# Patient Record
Sex: MALE | Race: BLACK OR AFRICAN AMERICAN | Employment: OTHER | URBAN - METROPOLITAN AREA
[De-identification: names, ages, dates, MRNs, and addresses within clinical notes are randomized per-mention and may not be internally consistent; named-entity substitution may affect disease eponyms.]

---

## 2017-04-30 ENCOUNTER — APPOINTMENT (OUTPATIENT)
Dept: GENERAL RADIOLOGY | Age: 62
End: 2017-04-30
Attending: EMERGENCY MEDICINE
Payer: SELF-PAY

## 2017-04-30 ENCOUNTER — HOSPITAL ENCOUNTER (EMERGENCY)
Age: 62
Discharge: HOME | End: 2017-04-30
Attending: EMERGENCY MEDICINE
Payer: SELF-PAY

## 2017-04-30 ENCOUNTER — APPOINTMENT (OUTPATIENT)
Dept: CT IMAGING | Age: 62
End: 2017-04-30
Attending: EMERGENCY MEDICINE
Payer: SELF-PAY

## 2017-04-30 VITALS
HEIGHT: 69 IN | DIASTOLIC BLOOD PRESSURE: 77 MMHG | HEART RATE: 87 BPM | RESPIRATION RATE: 17 BRPM | SYSTOLIC BLOOD PRESSURE: 137 MMHG | BODY MASS INDEX: 23.7 KG/M2 | OXYGEN SATURATION: 98 % | WEIGHT: 160 LBS | TEMPERATURE: 97.4 F

## 2017-04-30 DIAGNOSIS — R91.1 LUNG NODULE: ICD-10-CM

## 2017-04-30 DIAGNOSIS — R07.89 ATYPICAL CHEST PAIN: Primary | ICD-10-CM

## 2017-04-30 LAB
ALBUMIN SERPL BCP-MCNC: 3.5 G/DL (ref 3.2–4.6)
ALBUMIN/GLOB SERPL: 0.9 {RATIO} (ref 1.2–3.5)
ALP SERPL-CCNC: 89 U/L (ref 50–136)
ALT SERPL-CCNC: 18 U/L (ref 12–65)
ANION GAP BLD CALC-SCNC: 8 MMOL/L (ref 7–16)
AST SERPL W P-5'-P-CCNC: 11 U/L (ref 15–37)
BASOPHILS # BLD AUTO: 0 K/UL (ref 0–0.2)
BASOPHILS # BLD: 0 % (ref 0–2)
BILIRUB SERPL-MCNC: 0.4 MG/DL (ref 0.2–1.1)
BUN SERPL-MCNC: 22 MG/DL (ref 8–23)
CALCIUM SERPL-MCNC: 8.5 MG/DL (ref 8.3–10.4)
CHLORIDE SERPL-SCNC: 108 MMOL/L (ref 98–107)
CO2 SERPL-SCNC: 28 MMOL/L (ref 21–32)
CREAT SERPL-MCNC: 1.27 MG/DL (ref 0.8–1.5)
DIFFERENTIAL METHOD BLD: ABNORMAL
EOSINOPHIL # BLD: 0.1 K/UL (ref 0–0.8)
EOSINOPHIL NFR BLD: 2 % (ref 0.5–7.8)
ERYTHROCYTE [DISTWIDTH] IN BLOOD BY AUTOMATED COUNT: 13.7 % (ref 11.9–14.6)
GLOBULIN SER CALC-MCNC: 3.7 G/DL (ref 2.3–3.5)
GLUCOSE SERPL-MCNC: 121 MG/DL (ref 65–100)
HCT VFR BLD AUTO: 40.6 % (ref 41.1–50.3)
HGB BLD-MCNC: 13.9 G/DL (ref 13.6–17.2)
IMM GRANULOCYTES # BLD: 0 K/UL (ref 0–0.5)
IMM GRANULOCYTES NFR BLD AUTO: 0.2 % (ref 0–5)
LYMPHOCYTES # BLD AUTO: 19 % (ref 13–44)
LYMPHOCYTES # BLD: 1.2 K/UL (ref 0.5–4.6)
MCH RBC QN AUTO: 29 PG (ref 26.1–32.9)
MCHC RBC AUTO-ENTMCNC: 34.2 G/DL (ref 31.4–35)
MCV RBC AUTO: 84.6 FL (ref 79.6–97.8)
MONOCYTES # BLD: 0.5 K/UL (ref 0.1–1.3)
MONOCYTES NFR BLD AUTO: 7 % (ref 4–12)
NEUTS SEG # BLD: 4.7 K/UL (ref 1.7–8.2)
NEUTS SEG NFR BLD AUTO: 72 % (ref 43–78)
PLATELET # BLD AUTO: 269 K/UL (ref 150–450)
PMV BLD AUTO: 8.9 FL (ref 10.8–14.1)
POTASSIUM SERPL-SCNC: 3.8 MMOL/L (ref 3.5–5.1)
PROT SERPL-MCNC: 7.2 G/DL (ref 6.3–8.2)
RBC # BLD AUTO: 4.8 M/UL (ref 4.23–5.67)
SODIUM SERPL-SCNC: 144 MMOL/L (ref 136–145)
TROPONIN I BLD-MCNC: 0 NG/ML (ref 0–0.08)
TROPONIN I SERPL-MCNC: <0.02 NG/ML (ref 0.02–0.05)
WBC # BLD AUTO: 6.5 K/UL (ref 4.3–11.1)

## 2017-04-30 PROCEDURE — 93005 ELECTROCARDIOGRAM TRACING: CPT | Performed by: EMERGENCY MEDICINE

## 2017-04-30 PROCEDURE — 74011250636 HC RX REV CODE- 250/636: Performed by: EMERGENCY MEDICINE

## 2017-04-30 PROCEDURE — 74011000258 HC RX REV CODE- 258: Performed by: EMERGENCY MEDICINE

## 2017-04-30 PROCEDURE — 96374 THER/PROPH/DIAG INJ IV PUSH: CPT | Performed by: EMERGENCY MEDICINE

## 2017-04-30 PROCEDURE — 96375 TX/PRO/DX INJ NEW DRUG ADDON: CPT | Performed by: EMERGENCY MEDICINE

## 2017-04-30 PROCEDURE — 71020 XR CHEST PA LAT: CPT

## 2017-04-30 PROCEDURE — 96361 HYDRATE IV INFUSION ADD-ON: CPT | Performed by: EMERGENCY MEDICINE

## 2017-04-30 PROCEDURE — 74011636320 HC RX REV CODE- 636/320: Performed by: EMERGENCY MEDICINE

## 2017-04-30 PROCEDURE — 85025 COMPLETE CBC W/AUTO DIFF WBC: CPT | Performed by: EMERGENCY MEDICINE

## 2017-04-30 PROCEDURE — 84484 ASSAY OF TROPONIN QUANT: CPT | Performed by: EMERGENCY MEDICINE

## 2017-04-30 PROCEDURE — 99284 EMERGENCY DEPT VISIT MOD MDM: CPT | Performed by: EMERGENCY MEDICINE

## 2017-04-30 PROCEDURE — 80053 COMPREHEN METABOLIC PANEL: CPT | Performed by: EMERGENCY MEDICINE

## 2017-04-30 PROCEDURE — 71260 CT THORAX DX C+: CPT

## 2017-04-30 RX ORDER — TRAMADOL HYDROCHLORIDE 50 MG/1
50-100 TABLET ORAL
Qty: 20 TAB | Refills: 0 | Status: SHIPPED | OUTPATIENT
Start: 2017-04-30 | End: 2018-04-20

## 2017-04-30 RX ORDER — NAPROXEN SODIUM 550 MG/1
550 TABLET ORAL 2 TIMES DAILY WITH MEALS
Qty: 20 TAB | Refills: 0 | Status: SHIPPED | OUTPATIENT
Start: 2017-04-30 | End: 2018-04-20

## 2017-04-30 RX ORDER — SODIUM CHLORIDE 0.9 % (FLUSH) 0.9 %
10 SYRINGE (ML) INJECTION
Status: COMPLETED | OUTPATIENT
Start: 2017-04-30 | End: 2017-04-30

## 2017-04-30 RX ORDER — ONDANSETRON 2 MG/ML
4 INJECTION INTRAMUSCULAR; INTRAVENOUS
Status: COMPLETED | OUTPATIENT
Start: 2017-04-30 | End: 2017-04-30

## 2017-04-30 RX ORDER — HYDROMORPHONE HYDROCHLORIDE 1 MG/ML
1 INJECTION, SOLUTION INTRAMUSCULAR; INTRAVENOUS; SUBCUTANEOUS
Status: COMPLETED | OUTPATIENT
Start: 2017-04-30 | End: 2017-04-30

## 2017-04-30 RX ORDER — KETOROLAC TROMETHAMINE 30 MG/ML
30 INJECTION, SOLUTION INTRAMUSCULAR; INTRAVENOUS
Status: COMPLETED | OUTPATIENT
Start: 2017-04-30 | End: 2017-04-30

## 2017-04-30 RX ADMIN — SODIUM CHLORIDE 1000 ML: 900 INJECTION, SOLUTION INTRAVENOUS at 15:38

## 2017-04-30 RX ADMIN — KETOROLAC TROMETHAMINE 30 MG: 30 INJECTION, SOLUTION INTRAMUSCULAR; INTRAVENOUS at 15:38

## 2017-04-30 RX ADMIN — HYDROMORPHONE HYDROCHLORIDE 1 MG: 1 INJECTION, SOLUTION INTRAMUSCULAR; INTRAVENOUS; SUBCUTANEOUS at 15:38

## 2017-04-30 RX ADMIN — Medication 10 ML: at 17:12

## 2017-04-30 RX ADMIN — ONDANSETRON 4 MG: 2 INJECTION INTRAMUSCULAR; INTRAVENOUS at 15:38

## 2017-04-30 RX ADMIN — SODIUM CHLORIDE 100 ML: 900 INJECTION, SOLUTION INTRAVENOUS at 17:12

## 2017-04-30 RX ADMIN — IOVERSOL 100 ML: 741 INJECTION INTRA-ARTERIAL; INTRAVENOUS at 17:12

## 2017-04-30 NOTE — DISCHARGE INSTRUCTIONS
Follow-up in 1 year for the pulmonary nodule for repeat CAT scan with your primary care doctor  Follow-up with Dr. Philip Trevino if you don't have a primary care physician           Learning About Lung Nodules  What is a lung nodule? A lung nodule is a growth in the lung. A single nodule surrounded by lung tissue is called a solitary pulmonary nodule. A lung nodule might not cause any symptoms. Your doctor may have found one or more nodules on your lung when you were having a chest X-ray or CT scan. Or it may have been found during a lung cancer screening. A lung nodule may be caused by an old infection or cancer. It might also be a noncancerous growth. Lung nodules can cause a screening to give an abnormal result. Most nodules do not cause any harm. But without further tests, your doctor can't tell whether an abnormal finding is cancer, a harmless nodule, or something else. What can you expect when you have a lung nodule? Your doctor will look at several risk factors to see how likely it is that the nodule is cancer. He or she will look at:  · Whether you smoke or have ever smoked. · Your age and your family's medical history. · Whether you have ever had lung cancer. · The size and shape of the nodule. · Whether the nodule has changed in size. Your doctor may look at past chest X-rays or CT scans, if available, and compare them. Or you may have a series of CT scans to see if the nodule grows over time. What happens next depends on the risk of the nodule being cancer. · If you have no risk factors and the nodule is small, your doctor may advise doing nothing. · If the risk is small, your doctor may schedule follow-up appointments and tests. You may have more CT scans later to see if the nodule is growing. If the nodule hasn't changed in 3 to 6 months, you may have CT scans every year. If it hasn't changed in 2 years, you may not need any more tests.   · If there's a higher risk of cancer, your doctor may:  ¨ Do a PET scan, which may help tell if the nodule is cancerous or not. ¨ Take a sample of tissue from the nodule for testing. This is called a biopsy. ¨ Remove the nodule with surgery. Follow-up care is a key part of your treatment and safety. Be sure to make and go to all appointments, and call your doctor if you are having problems. It's also a good idea to know your test results and keep a list of the medicines you take. Where can you learn more? Go to http://rakesh-jenn.info/. Enter S789 in the search box to learn more about \"Learning About Lung Nodules. \"  Current as of: July 26, 2016  Content Version: 11.2  © 8419-6452 [employer]. Care instructions adapted under license by [employer] (which disclaims liability or warranty for this information). If you have questions about a medical condition or this instruction, always ask your healthcare professional. [place] [address] any warranty or liability for your use of this information. Musculoskeletal Chest Pain: Care Instructions  Your Care Instructions  Chest pain is not always a sign that something is wrong with your heart or that you have another serious problem. The doctor thinks your chest pain is caused by strained muscles or ligaments, inflamed chest cartilage, or another problem in your chest, rather than by your heart. You may need more tests to find the cause of your chest pain. Follow-up care is a key part of your treatment and safety. Be sure to make and go to all appointments, and call your doctor if you are having problems. Its also a good idea to know your test results and keep a list of the medicines you take. How can you care for yourself at home? · Take pain medicines exactly as directed. ¨ If the doctor gave you a prescription medicine for pain, take it as prescribed.   ¨ If you are not taking a prescription pain medicine, ask your doctor if you can take an over-the-counter medicine. · Rest and protect the sore area. · Stop, change, or take a break from any activity that may be causing your pain or soreness. · Put ice or a cold pack on the sore area for 10 to 20 minutes at a time. Try to do this every 1 to 2 hours for the next 3 days (when you are awake) or until the swelling goes down. Put a thin cloth between the ice and your skin. · After 2 or 3 days, apply a heating pad set on low or a warm cloth to the area that hurts. Some doctors suggest that you go back and forth between hot and cold. · Do not wrap or tape your ribs for support. This may cause you to take smaller breaths, which could increase your risk of lung problems. · Mentholated creams such as Bengay or Icy Hot may soothe sore muscles. Follow the instructions on the package. · Follow your doctor's instructions for exercising. · Gentle stretching and massage may help you get better faster. Stretch slowly to the point just before pain begins, and hold the stretch for at least 15 to 30 seconds. Do this 3 or 4 times a day. Stretch just after you have applied heat. · As your pain gets better, slowly return to your normal activities. Any increased pain may be a sign that you need to rest a while longer. When should you call for help? Call 911 anytime you think you may need emergency care. For example, call if:  · You have chest pain or pressure. This may occur with:  ¨ Sweating. ¨ Shortness of breath. ¨ Nausea or vomiting. ¨ Pain that spreads from the chest to the neck, jaw, or one or both shoulders or arms. ¨ Dizziness or lightheadedness. ¨ A fast or uneven pulse. After calling 911, chew 1 adult-strength aspirin. Wait for an ambulance. Do not try to drive yourself. · You have sudden chest pain and shortness of breath, or you cough up blood. Call your doctor now or seek immediate medical care if:  · You have any trouble breathing. · Your chest pain gets worse.   · Your chest pain occurs consistently with exercise and is relieved by rest.  Watch closely for changes in your health, and be sure to contact your doctor if:  · Your chest pain does not get better after 1 week. Where can you learn more? Go to http://rakesh-jenn.info/. Enter V293 in the search box to learn more about \"Musculoskeletal Chest Pain: Care Instructions. \"  Current as of: May 27, 2016  Content Version: 11.2  © 6371-0433 [employer]. Care instructions adapted under license by [employer] (which disclaims liability or warranty for this information). If you have questions about a medical condition or this instruction, always ask your healthcare professional. [address] any warranty or liability for your use of this information. Chest Pain: Care Instructions  Your Care Instructions  There are many things that can cause chest pain. Some are not serious and will get better on their own in a few days. But some kinds of chest pain need more testing and treatment. Your doctor may have recommended a follow-up visit in the next 8 to 12 hours. If you are not getting better, you may need more tests or treatment. Even though your doctor has released you, you still need to watch for any problems. The doctor carefully checked you, but sometimes problems can develop later. If you have new symptoms or if your symptoms do not get better, get medical care right away. If you have worse or different chest pain or pressure that lasts more than 5 minutes or you passed out (lost consciousness), call 911 or seek other emergency help right away. A medical visit is only one step in your treatment. Even if you feel better, you still need to do what your doctor recommends, such as going to all suggested follow-up appointments and taking medicines exactly as directed. This will help you recover and help prevent future problems. How can you care for yourself at home?   · Rest until you feel better. · Take your medicine exactly as prescribed. Call your doctor if you think you are having a problem with your medicine. · Do not drive after taking a prescription pain medicine. When should you call for help? Call 911 if:  · You passed out (lost consciousness). · You have severe difficulty breathing. · You have symptoms of a heart attack. These may include:  ¨ Chest pain or pressure, or a strange feeling in your chest.  ¨ Sweating. ¨ Shortness of breath. ¨ Nausea or vomiting. ¨ Pain, pressure, or a strange feeling in your back, neck, jaw, or upper belly or in one or both shoulders or arms. ¨ Lightheadedness or sudden weakness. ¨ A fast or irregular heartbeat. After you call 911, the  may tell you to chew 1 adult-strength or 2 to 4 low-dose aspirin. Wait for an ambulance. Do not try to drive yourself. Call your doctor today if:  · You have any trouble breathing. · Your chest pain gets worse. · You are dizzy or lightheaded, or you feel like you may faint. · You are not getting better as expected. · You are having new or different chest pain. Where can you learn more? Go to http://rakesh-jenn.info/. Enter A120 in the search box to learn more about \"Chest Pain: Care Instructions. \"  Current as of: May 27, 2016  Content Version: 11.2  © 0394-3387 [employer]. Care instructions adapted under license by [employer] (which disclaims liability or warranty for this information). If you have questions about a medical condition or this instruction, always ask your healthcare professional. [address] any warranty or liability for your use of this information.

## 2017-04-30 NOTE — ED PROVIDER NOTES
HPI Comments: Pt states thsi sharp upper right chest pain began 2 hours ago whilst laying down, he then rolled over and felt a sudden stab of pain about the clavicle,  It hurts to move and hurts to breath. Family indicates he has actually been complaining about this for a few days, just not this severely. He denies nausea/vomiting, diaphoresis. No arm or shoulder pain but it does radiate up about the neck area. No remedies tried  No prior episodes    Patient is a 62 y.o. male presenting with chest pain. The history is provided by the patient and a relative. Chest Pain (Angina)    This is a new problem. Associated symptoms include shortness of breath. Pertinent negatives include no abdominal pain, no back pain, no cough, no dizziness, no fever, no headaches, no nausea, no numbness, no palpitations, no vomiting and no weakness. Past Medical History:   Diagnosis Date    Cardiomegaly     hernia     MI (myocardial infarction) ([facility] [address]     5-6yrs ago       Past Surgical History:   Procedure Laterality Date    ABDOMEN SURGERY PROC UNLISTED      hernia    HX OTHER SURGICAL           No family history on file. Social History     Social History    Marital status: SINGLE     Spouse name: N/A    Number of children: N/A    Years of education: N/A     Occupational History    Not on file. Social History Main Topics    Smoking status: Current Every Day Smoker     Packs/day: 0.50    Smokeless tobacco: Not on file    Alcohol use No    Drug use: Yes     Special: Cocaine      Comment: yesterday    Sexual activity: Not Currently     Other Topics Concern    Not on file     Social History Narrative    No narrative on file         ALLERGIES: Review of patient's allergies indicates no known allergies. Review of Systems   Constitutional: Negative for chills and fever. HENT: Negative for rhinorrhea and sore throat. Eyes: Negative for discharge and redness.    Respiratory: Positive for shortness of breath. Negative for cough. Cardiovascular: Positive for chest pain. Negative for palpitations. Gastrointestinal: Negative for abdominal pain, nausea and vomiting. Musculoskeletal: Positive for neck pain. Negative for arthralgias and back pain. Skin: Negative for rash. Neurological: Negative for dizziness, weakness, numbness and headaches. All other systems reviewed and are negative. Vitals:    04/30/17 1351   BP: 146/86   Pulse: (!) 103   Resp: 17   Temp: 97.4 °F (36.3 °C)   SpO2: 96%   Weight: 72.6 kg (160 lb)   Height: 5' 9\" (1.753 m)            Physical Exam   Constitutional: He is oriented to person, place, and time. He appears well-developed and well-nourished. He appears distressed. HENT:   Head: Normocephalic and atraumatic. Eyes: Conjunctivae are normal. Pupils are equal, round, and reactive to light. Right eye exhibits no discharge. Left eye exhibits no discharge. No scleral icterus. Neck: Normal range of motion. Neck supple. Cardiovascular: Normal rate, regular rhythm and normal heart sounds. Exam reveals no gallop. No murmur heard. Pulmonary/Chest: Effort normal and breath sounds normal. No respiratory distress. He has no wheezes. He has no rales. He exhibits tenderness. He exhibits no crepitus. Abdominal: Soft. Bowel sounds are normal. There is no tenderness. There is no guarding. Musculoskeletal: Normal range of motion. He exhibits no edema. Neurological: He is alert and oriented to person, place, and time. He exhibits normal muscle tone. cni 2-12 grossly   Skin: Skin is warm and dry. He is not diaphoretic. Psychiatric: He has a normal mood and affect. His behavior is normal.   Nursing note and vitals reviewed.        MDM  Number of Diagnoses or Management Options  Diagnosis management comments: Medical decision making note:  Chest pain  Hurts to touch, move, breathe  ekg and troponin are ok    This concludes the \"medical decision making note\" part of this emergency department visit note.       ED Course       Procedures

## 2017-04-30 NOTE — ED NOTES
I have reviewed discharge instructions with the patient. The patient verbalized understanding. No questions when given opportunity to ask. Patient discharged via [facility] to [place] [address] with family who is driving home. Esign not available.

## 2017-05-01 ENCOUNTER — HOSPITAL ENCOUNTER (EMERGENCY)
Age: 62
Discharge: HOME | End: 2017-05-01
Payer: SELF-PAY

## 2017-05-01 ENCOUNTER — APPOINTMENT (OUTPATIENT)
Dept: GENERAL RADIOLOGY | Age: 62
End: 2017-05-01
Attending: EMERGENCY MEDICINE
Payer: SELF-PAY

## 2017-05-01 VITALS
TEMPERATURE: 98.3 F | DIASTOLIC BLOOD PRESSURE: 92 MMHG | SYSTOLIC BLOOD PRESSURE: 145 MMHG | OXYGEN SATURATION: 94 % | HEART RATE: 95 BPM | RESPIRATION RATE: 16 BRPM | HEIGHT: 69 IN | WEIGHT: 160 LBS | BODY MASS INDEX: 23.7 KG/M2

## 2017-05-01 DIAGNOSIS — R07.89 ATYPICAL CHEST PAIN: Primary | ICD-10-CM

## 2017-05-01 DIAGNOSIS — F41.9 ANXIETY: ICD-10-CM

## 2017-05-01 LAB
ALBUMIN SERPL BCP-MCNC: 3.5 G/DL (ref 3.2–4.6)
ALBUMIN/GLOB SERPL: 0.9 {RATIO} (ref 1.2–3.5)
ALP SERPL-CCNC: 87 U/L (ref 50–136)
ALT SERPL-CCNC: 21 U/L (ref 12–65)
ANION GAP BLD CALC-SCNC: 7 MMOL/L (ref 7–16)
AST SERPL W P-5'-P-CCNC: 11 U/L (ref 15–37)
ATRIAL RATE: 105 BPM
ATRIAL RATE: 92 BPM
BASOPHILS # BLD AUTO: 0 K/UL (ref 0–0.2)
BASOPHILS # BLD: 0 % (ref 0–2)
BILIRUB SERPL-MCNC: 0.6 MG/DL (ref 0.2–1.1)
BUN SERPL-MCNC: 19 MG/DL (ref 8–23)
CALCIUM SERPL-MCNC: 8.1 MG/DL (ref 8.3–10.4)
CALCULATED P AXIS, ECG09: 65 DEGREES
CALCULATED P AXIS, ECG09: 69 DEGREES
CALCULATED R AXIS, ECG10: -16 DEGREES
CALCULATED R AXIS, ECG10: 37 DEGREES
CALCULATED T AXIS, ECG11: 45 DEGREES
CALCULATED T AXIS, ECG11: 49 DEGREES
CHLORIDE SERPL-SCNC: 104 MMOL/L (ref 98–107)
CO2 SERPL-SCNC: 28 MMOL/L (ref 21–32)
CREAT SERPL-MCNC: 1.3 MG/DL (ref 0.8–1.5)
DIAGNOSIS, 93000: NORMAL
DIAGNOSIS, 93000: NORMAL
DIFFERENTIAL METHOD BLD: ABNORMAL
EOSINOPHIL # BLD: 0 K/UL (ref 0–0.8)
EOSINOPHIL NFR BLD: 0 % (ref 0.5–7.8)
ERYTHROCYTE [DISTWIDTH] IN BLOOD BY AUTOMATED COUNT: 13.6 % (ref 11.9–14.6)
GLOBULIN SER CALC-MCNC: 4 G/DL (ref 2.3–3.5)
GLUCOSE SERPL-MCNC: 164 MG/DL (ref 65–100)
HCT VFR BLD AUTO: 41 % (ref 41.1–50.3)
HGB BLD-MCNC: 14.3 G/DL (ref 13.6–17.2)
IMM GRANULOCYTES # BLD: 0 K/UL (ref 0–0.5)
IMM GRANULOCYTES NFR BLD AUTO: 0.3 % (ref 0–5)
LYMPHOCYTES # BLD AUTO: 8 % (ref 13–44)
LYMPHOCYTES # BLD: 0.8 K/UL (ref 0.5–4.6)
MCH RBC QN AUTO: 29.5 PG (ref 26.1–32.9)
MCHC RBC AUTO-ENTMCNC: 34.9 G/DL (ref 31.4–35)
MCV RBC AUTO: 84.5 FL (ref 79.6–97.8)
MONOCYTES # BLD: 0.8 K/UL (ref 0.1–1.3)
MONOCYTES NFR BLD AUTO: 8 % (ref 4–12)
NEUTS SEG # BLD: 8.1 K/UL (ref 1.7–8.2)
NEUTS SEG NFR BLD AUTO: 84 % (ref 43–78)
P-R INTERVAL, ECG05: 142 MS
P-R INTERVAL, ECG05: 154 MS
PLATELET # BLD AUTO: 258 K/UL (ref 150–450)
PMV BLD AUTO: 8.9 FL (ref 10.8–14.1)
POTASSIUM SERPL-SCNC: 3.9 MMOL/L (ref 3.5–5.1)
PROT SERPL-MCNC: 7.5 G/DL (ref 6.3–8.2)
Q-T INTERVAL, ECG07: 334 MS
Q-T INTERVAL, ECG07: 366 MS
QRS DURATION, ECG06: 80 MS
QRS DURATION, ECG06: 86 MS
QTC CALCULATION (BEZET), ECG08: 413 MS
QTC CALCULATION (BEZET), ECG08: 483 MS
RBC # BLD AUTO: 4.85 M/UL (ref 4.23–5.67)
SODIUM SERPL-SCNC: 139 MMOL/L (ref 136–145)
TROPONIN I SERPL-MCNC: <0.02 NG/ML (ref 0.02–0.05)
VENTRICULAR RATE, ECG03: 105 BPM
VENTRICULAR RATE, ECG03: 92 BPM
WBC # BLD AUTO: 9.7 K/UL (ref 4.3–11.1)

## 2017-05-01 PROCEDURE — 85025 COMPLETE CBC W/AUTO DIFF WBC: CPT | Performed by: EMERGENCY MEDICINE

## 2017-05-01 PROCEDURE — 96374 THER/PROPH/DIAG INJ IV PUSH: CPT

## 2017-05-01 PROCEDURE — 71020 XR CHEST PA LAT: CPT

## 2017-05-01 PROCEDURE — 84484 ASSAY OF TROPONIN QUANT: CPT | Performed by: EMERGENCY MEDICINE

## 2017-05-01 PROCEDURE — 74011250636 HC RX REV CODE- 250/636

## 2017-05-01 PROCEDURE — 93005 ELECTROCARDIOGRAM TRACING: CPT | Performed by: EMERGENCY MEDICINE

## 2017-05-01 PROCEDURE — 99284 EMERGENCY DEPT VISIT MOD MDM: CPT

## 2017-05-01 PROCEDURE — 80053 COMPREHEN METABOLIC PANEL: CPT | Performed by: EMERGENCY MEDICINE

## 2017-05-01 RX ORDER — LORAZEPAM 2 MG/ML
1 INJECTION INTRAMUSCULAR
Status: COMPLETED | OUTPATIENT
Start: 2017-05-01 | End: 2017-05-01

## 2017-05-01 RX ORDER — SODIUM CHLORIDE 0.9 % (FLUSH) 0.9 %
5-10 SYRINGE (ML) INJECTION AS NEEDED
Status: DISCONTINUED | OUTPATIENT
Start: 2017-05-01 | End: 2017-05-01 | Stop reason: HOSPADM

## 2017-05-01 RX ORDER — LEVOFLOXACIN 750 MG/1
750 TABLET ORAL DAILY
Qty: 7 TAB | Refills: 0 | Status: SHIPPED | OUTPATIENT
Start: 2017-05-01 | End: 2018-04-20

## 2017-05-01 RX ORDER — LORAZEPAM 0.5 MG/1
1 TABLET ORAL
Qty: 20 TAB | Refills: 0 | Status: SHIPPED | OUTPATIENT
Start: 2017-05-01 | End: 2018-04-20

## 2017-05-01 RX ORDER — SODIUM CHLORIDE 0.9 % (FLUSH) 0.9 %
5-10 SYRINGE (ML) INJECTION EVERY 8 HOURS
Status: DISCONTINUED | OUTPATIENT
Start: 2017-05-01 | End: 2017-05-01 | Stop reason: HOSPADM

## 2017-05-01 RX ADMIN — LORAZEPAM 1 MG: 2 INJECTION, SOLUTION INTRAMUSCULAR; INTRAVENOUS at 02:38

## 2017-05-01 NOTE — ED PROVIDER NOTES
HPI Comments: 62-year-old male complaining of sharp substernal chest pain. Patient awoke from sleep with this pain. patient was seen at this facility several hours ago. He had a complete cardiac workup including a CAT scan of the chest which was negative. Patient went home to bed but awoke from sleep with the pain. Patient is a 62 y.o. male presenting with chest pain. The history is provided by the patient. Chest Pain (Angina)    This is a recurrent problem. The current episode started yesterday. The problem has not changed since onset. The problem occurs constantly. The pain is associated with normal activity. The pain is at a severity of 10/10. The pain is severe. The quality of the pain is described as sharp. The pain radiates to the mid back. He has tried nothing for the symptoms. Past Medical History:   Diagnosis Date    Cardiomegaly     hernia     MI (myocardial infarction) ([facility] [address]     5-6yrs ago       Past Surgical History:   Procedure Laterality Date    ABDOMEN SURGERY PROC UNLISTED      hernia    HX OTHER SURGICAL           History reviewed. No pertinent family history. Social History     Social History    Marital status: SINGLE     Spouse name: N/A    Number of children: N/A    Years of education: N/A     Occupational History    Not on file. Social History Main Topics    Smoking status: Current Every Day Smoker     Packs/day: 0.50    Smokeless tobacco: Not on file    Alcohol use No    Drug use: Yes     Special: Cocaine      Comment: yesterday    Sexual activity: Not Currently     Other Topics Concern    Not on file     Social History Narrative         ALLERGIES: Review of patient's allergies indicates no known allergies. Review of Systems   Constitutional: Negative. Negative for activity change. HENT: Negative. Eyes: Negative. Respiratory: Negative. Cardiovascular: Positive for chest pain. Gastrointestinal: Negative. Genitourinary: Negative. Musculoskeletal: Negative. Skin: Negative. Neurological: Negative. Psychiatric/Behavioral: Negative. All other systems reviewed and are negative. Vitals:    05/01/17 0033   BP: (!) 145/92   Pulse: 95   Resp: 16   Temp: 98.3 °F (36.8 °C)   SpO2: 94%   Weight: 72.6 kg (160 lb)   Height: 5' 9\" (1.753 m)            Physical Exam   Constitutional: He is oriented to person, place, and time. He appears well-developed and well-nourished. No distress. HENT:   Head: Normocephalic and atraumatic. Right Ear: External ear normal.   Left Ear: External ear normal.   Nose: Nose normal.   Mouth/Throat: Oropharynx is clear and moist. No oropharyngeal exudate. Eyes: Conjunctivae and EOM are normal. Pupils are equal, round, and reactive to light. Right eye exhibits no discharge. Left eye exhibits no discharge. No scleral icterus. Neck: Normal range of motion. Neck supple. No JVD present. No tracheal deviation present. Cardiovascular: Normal rate, regular rhythm and intact distal pulses. Pulmonary/Chest: Effort normal and breath sounds normal. No stridor. No respiratory distress. He has no wheezes. He exhibits no tenderness. Abdominal: Soft. Bowel sounds are normal. He exhibits no distension and no mass. There is no tenderness. Musculoskeletal: Normal range of motion. He exhibits no edema or tenderness. Neurological: He is alert and oriented to person, place, and time. No cranial nerve deficit. Skin: Skin is warm and dry. No rash noted. He is not diaphoretic. No erythema. No pallor. Psychiatric: Thought content normal. His mood appears anxious. He is agitated. Nursing note and vitals reviewed.        MDM  Number of Diagnoses or Management Options  Anxiety:   Atypical chest pain:   Diagnosis management comments: More cardiac workup is negative CT was not repeated lung sounds are clear patient resting comfortably believe that there is a psychogenic component to this problem however we will ask him to follow his primary care physician for further workup. X-ray chest radiologist reads it has slightly increased density which could be fixed process. We'll treat him with antibiotics to cover.        Amount and/or Complexity of Data Reviewed  Clinical lab tests: reviewed and ordered  Tests in the radiology section of CPT®: reviewed    Risk of Complications, Morbidity, and/or Mortality  Presenting problems: moderate  Diagnostic procedures: moderate  Management options: moderate      ED Course       Procedures

## 2017-05-01 NOTE — DISCHARGE INSTRUCTIONS
Chest Pain: Care Instructions  Your Care Instructions  There are many things that can cause chest pain. Some are not serious and will get better on their own in a few days. But some kinds of chest pain need more testing and treatment. Your doctor may have recommended a follow-up visit in the next 8 to 12 hours. If you are not getting better, you may need more tests or treatment. Even though your doctor has released you, you still need to watch for any problems. The doctor carefully checked you, but sometimes problems can develop later. If you have new symptoms or if your symptoms do not get better, get medical care right away. If you have worse or different chest pain or pressure that lasts more than 5 minutes or you passed out (lost consciousness), call 911 or seek other emergency help right away. A medical visit is only one step in your treatment. Even if you feel better, you still need to do what your doctor recommends, such as going to all suggested follow-up appointments and taking medicines exactly as directed. This will help you recover and help prevent future problems. How can you care for yourself at home? · Rest until you feel better. · Take your medicine exactly as prescribed. Call your doctor if you think you are having a problem with your medicine. · Do not drive after taking a prescription pain medicine. When should you call for help? Call 911 if:  · You passed out (lost consciousness). · You have severe difficulty breathing. · You have symptoms of a heart attack. These may include:  ¨ Chest pain or pressure, or a strange feeling in your chest.  ¨ Sweating. ¨ Shortness of breath. ¨ Nausea or vomiting. ¨ Pain, pressure, or a strange feeling in your back, neck, jaw, or upper belly or in one or both shoulders or arms. ¨ Lightheadedness or sudden weakness. ¨ A fast or irregular heartbeat.   After you call 911, the  may tell you to chew 1 adult-strength or 2 to 4 low-dose aspirin. Wait for an ambulance. Do not try to drive yourself. Call your doctor today if:  · You have any trouble breathing. · Your chest pain gets worse. · You are dizzy or lightheaded, or you feel like you may faint. · You are not getting better as expected. · You are having new or different chest pain. Where can you learn more? Go to http://rakesh-jenn.info/. Enter A120 in the search box to learn more about \"Chest Pain: Care Instructions. \"  Current as of: May 27, 2016  Content Version: 11.2  © 2063-8634 [employer]. Care instructions adapted under license by [employer] (which disclaims liability or warranty for this information). If you have questions about a medical condition or this instruction, always ask your healthcare professional. [address] any warranty or liability for your use of this information. Anxiety Disorder: Care Instructions  Your Care Instructions  Anxiety is a normal reaction to stress. Difficult situations can cause you to have symptoms such as sweaty palms and a nervous feeling. In an anxiety disorder, the symptoms are far more severe. Constant worry, muscle tension, trouble sleeping, nausea and diarrhea, and other symptoms can make normal daily activities difficult or impossible. These symptoms may occur for no reason, and they can affect your work, school, or social life. Medicines, counseling, and self-care can all help. Follow-up care is a key part of your treatment and safety. Be sure to make and go to all appointments, and call your doctor if you are having problems. It's also a good idea to know your test results and keep a list of the medicines you take. How can you care for yourself at home? · Take medicines exactly as directed. Call your doctor if you think you are having a problem with your medicine. · Go to your counseling sessions and follow-up appointments.   · Recognize and accept your anxiety. Then, when you are in a situation that makes you anxious, say to yourself, \"This is not an emergency. I feel uncomfortable, but I am not in danger. I can keep going even if I feel anxious. \"  · Be kind to your body:  ¨ Relieve tension with exercise or a massage. ¨ Get enough rest.  ¨ Avoid alcohol, caffeine, nicotine, and illegal drugs. They can increase your anxiety level and cause sleep problems. ¨ Learn and do relaxation techniques. See below for more about these techniques. · Engage your mind. Get out and do something you enjoy. Go to a Zapl[employer] movie, or take a walk or hike. Plan your day. Having too much or too little to do can make you anxious. · Keep a record of your symptoms. Discuss your fears with a good friend or family member, or join a support group for people with similar problems. Talking to others sometimes relieves stress. · Get involved in social groups, or volunteer to help others. Being alone sometimes makes things seem worse than they are. · Get at least 30 minutes of exercise on most days of the week to relieve stress. Walking is a good choice. You also may want to do other activities, such as running, swimming, cycling, or playing tennis or team sports. Relaxation techniques  Do relaxation exercises 10 to 20 minutes a day. You can play soothing, relaxing music while you do them, if you wish. · Tell others in your house that you are going to do your relaxation exercises. Ask them not to disturb you. · Find a comfortable place, away from all distractions and noise. · Lie down on your back, or sit with your back straight. · Focus on your breathing. Make it slow and steady. · Breathe in through your nose. Breathe out through either your nose or mouth. · Breathe deeply, filling up the area between your navel and your rib cage. Breathe so that your belly goes up and down. · Do not hold your breath. · Breathe like this for 5 to 10 minutes.  Notice the feeling of calmness throughout your whole body. As you continue to breathe slowly and deeply, relax by doing the following for another 5 to 10 minutes:  · Tighten and relax each muscle group in your body. You can begin at your toes and work your way up to your head. · Imagine your muscle groups relaxing and becoming heavy. · Empty your mind of all thoughts. · Let yourself relax more and more deeply. · Become aware of the state of calmness that surrounds you. · When your relaxation time is over, you can bring yourself back to alertness by moving your fingers and toes and then your hands and feet and then stretching and moving your entire body. Sometimes people fall asleep during relaxation, but they usually wake up shortly afterward. · Always give yourself time to return to full alertness before you drive a car or do anything that might cause an accident if you are not fully alert. Never play a relaxation tape while you drive a car. When should you call for help? Call 911 anytime you think you may need emergency care. For example, call if:  · You feel you cannot stop from hurting yourself or someone else. Keep the numbers for these national suicide hotlines: 1-780-170-TALK (8-686.565.3133) and 9-741-KTWFOCW (8-810.487.5611). If you or someone you know talks about suicide or feeling hopeless, get help right away. Watch closely for changes in your health, and be sure to contact your doctor if:  · You have anxiety or fear that affects your life. · You have symptoms of anxiety that are new or different from those you had before. Where can you learn more? Go to http://rakesh-jenn.info/. Enter P754 in the search box to learn more about \"Anxiety Disorder: Care Instructions. \"  Current as of: July 26, 2016  Content Version: 11.2  © 7922-7872 [employer]. Care instructions adapted under license by [employer] (which disclaims liability or warranty for this information).  If you have questions about a medical condition or this instruction, always ask your healthcare professional. [place] [address] any warranty or liability for your use of this information.

## 2017-05-01 NOTE — ED TRIAGE NOTES
Patient seen here earlier for chest pain discharged 2 hours ago. States sister woke him up for chest pain. Patient states mid chest pain worse when he breathes. Patient falling asleep in triage.

## 2018-04-20 ENCOUNTER — APPOINTMENT (OUTPATIENT)
Dept: CT IMAGING | Age: 63
End: 2018-04-20
Attending: EMERGENCY MEDICINE
Payer: SELF-PAY

## 2018-04-20 ENCOUNTER — HOSPITAL ENCOUNTER (EMERGENCY)
Age: 63
Discharge: HOME | End: 2018-04-21
Payer: SELF-PAY

## 2018-04-20 ENCOUNTER — HOSPITAL ENCOUNTER (OUTPATIENT)
Age: 63
Setting detail: OBSERVATION
Discharge: HOME | End: 2018-04-20
Attending: EMERGENCY MEDICINE | Admitting: INTERNAL MEDICINE
Payer: SELF-PAY

## 2018-04-20 VITALS
RESPIRATION RATE: 16 BRPM | HEIGHT: 69 IN | OXYGEN SATURATION: 98 % | WEIGHT: 145 LBS | TEMPERATURE: 98.2 F | SYSTOLIC BLOOD PRESSURE: 110 MMHG | HEART RATE: 61 BPM | DIASTOLIC BLOOD PRESSURE: 70 MMHG | BODY MASS INDEX: 21.48 KG/M2

## 2018-04-20 DIAGNOSIS — K43.9 VENTRAL HERNIA WITHOUT OBSTRUCTION OR GANGRENE: ICD-10-CM

## 2018-04-20 DIAGNOSIS — R07.9 CHEST PAIN, UNSPECIFIED TYPE: Primary | ICD-10-CM

## 2018-04-20 DIAGNOSIS — R07.89 OTHER CHEST PAIN: Primary | ICD-10-CM

## 2018-04-20 DIAGNOSIS — N28.1 RENAL CYST: ICD-10-CM

## 2018-04-20 PROBLEM — R55 NEAR SYNCOPE: Status: ACTIVE | Noted: 2018-04-20

## 2018-04-20 PROBLEM — Z72.0 TOBACCO ABUSE: Status: ACTIVE | Noted: 2018-04-20

## 2018-04-20 LAB
ALBUMIN SERPL-MCNC: 3.4 G/DL (ref 3.2–4.6)
ALBUMIN/GLOB SERPL: 1.1 {RATIO} (ref 1.2–3.5)
ALP SERPL-CCNC: 83 U/L (ref 50–136)
ALT SERPL-CCNC: 24 U/L (ref 12–65)
ANION GAP SERPL CALC-SCNC: 6 MMOL/L (ref 7–16)
APTT PPP: 26.7 SEC (ref 23.2–35.3)
AST SERPL-CCNC: 16 U/L (ref 15–37)
ATRIAL RATE: 48 BPM
ATRIAL RATE: 52 BPM
ATRIAL RATE: 62 BPM
BASOPHILS # BLD: 0.1 K/UL (ref 0–0.2)
BASOPHILS NFR BLD: 1 % (ref 0–2)
BILIRUB SERPL-MCNC: 0.3 MG/DL (ref 0.2–1.1)
BUN SERPL-MCNC: 25 MG/DL (ref 8–23)
CALCIUM SERPL-MCNC: 8.3 MG/DL (ref 8.3–10.4)
CALCULATED P AXIS, ECG09: 67 DEGREES
CALCULATED P AXIS, ECG09: 67 DEGREES
CALCULATED P AXIS, ECG09: 70 DEGREES
CALCULATED R AXIS, ECG10: 59 DEGREES
CALCULATED R AXIS, ECG10: 62 DEGREES
CALCULATED R AXIS, ECG10: 62 DEGREES
CALCULATED T AXIS, ECG11: 46 DEGREES
CALCULATED T AXIS, ECG11: 60 DEGREES
CALCULATED T AXIS, ECG11: 61 DEGREES
CHLORIDE SERPL-SCNC: 108 MMOL/L (ref 98–107)
CO2 SERPL-SCNC: 28 MMOL/L (ref 21–32)
CREAT BLD-MCNC: 1.3 MG/DL (ref 0.8–1.5)
CREAT SERPL-MCNC: 1.29 MG/DL (ref 0.8–1.5)
D DIMER PPP FEU-MCNC: 0.41 UG/ML(FEU)
DIAGNOSIS, 93000: NORMAL
DIFFERENTIAL METHOD BLD: ABNORMAL
EOSINOPHIL # BLD: 0.1 K/UL (ref 0–0.8)
EOSINOPHIL NFR BLD: 2 % (ref 0.5–7.8)
ERYTHROCYTE [DISTWIDTH] IN BLOOD BY AUTOMATED COUNT: 13.4 % (ref 11.9–14.6)
GLOBULIN SER CALC-MCNC: 3 G/DL (ref 2.3–3.5)
GLUCOSE SERPL-MCNC: 114 MG/DL (ref 65–100)
HCT VFR BLD AUTO: 38.1 % (ref 41.1–50.3)
HGB BLD-MCNC: 12.6 G/DL (ref 13.6–17.2)
IMM GRANULOCYTES # BLD: 0 K/UL (ref 0–0.5)
IMM GRANULOCYTES NFR BLD AUTO: 0 % (ref 0–5)
INR PPP: 1
LYMPHOCYTES # BLD: 1.7 K/UL (ref 0.5–4.6)
LYMPHOCYTES NFR BLD: 30 % (ref 13–44)
MCH RBC QN AUTO: 29.2 PG (ref 26.1–32.9)
MCHC RBC AUTO-ENTMCNC: 33.1 G/DL (ref 31.4–35)
MCV RBC AUTO: 88.2 FL (ref 79.6–97.8)
MONOCYTES # BLD: 0.3 K/UL (ref 0.1–1.3)
MONOCYTES NFR BLD: 5 % (ref 4–12)
NEUTS SEG # BLD: 3.5 K/UL (ref 1.7–8.2)
NEUTS SEG NFR BLD: 62 % (ref 43–78)
P-R INTERVAL, ECG05: 140 MS
P-R INTERVAL, ECG05: 164 MS
P-R INTERVAL, ECG05: 164 MS
PLATELET # BLD AUTO: 251 K/UL (ref 150–450)
PMV BLD AUTO: 8.9 FL (ref 10.8–14.1)
POTASSIUM SERPL-SCNC: 3.8 MMOL/L (ref 3.5–5.1)
PROT SERPL-MCNC: 6.4 G/DL (ref 6.3–8.2)
PROTHROMBIN TIME: 12.5 SEC (ref 11.5–14.5)
Q-T INTERVAL, ECG07: 428 MS
Q-T INTERVAL, ECG07: 450 MS
Q-T INTERVAL, ECG07: 494 MS
QRS DURATION, ECG06: 102 MS
QRS DURATION, ECG06: 96 MS
QRS DURATION, ECG06: 98 MS
QTC CALCULATION (BEZET), ECG08: 418 MS
QTC CALCULATION (BEZET), ECG08: 434 MS
QTC CALCULATION (BEZET), ECG08: 441 MS
RBC # BLD AUTO: 4.32 M/UL (ref 4.23–5.67)
SODIUM SERPL-SCNC: 142 MMOL/L (ref 136–145)
TROPONIN I BLD-MCNC: 0 NG/ML (ref 0.02–0.05)
TROPONIN I BLD-MCNC: 0.01 NG/ML (ref 0.02–0.05)
TROPONIN I SERPL-MCNC: <0.02 NG/ML (ref 0.02–0.05)
VENTRICULAR RATE, ECG03: 48 BPM
VENTRICULAR RATE, ECG03: 52 BPM
VENTRICULAR RATE, ECG03: 62 BPM
WBC # BLD AUTO: 5.7 K/UL (ref 4.3–11.1)

## 2018-04-20 PROCEDURE — 80053 COMPREHEN METABOLIC PANEL: CPT | Performed by: EMERGENCY MEDICINE

## 2018-04-20 PROCEDURE — C1769 GUIDE WIRE: HCPCS

## 2018-04-20 PROCEDURE — 85730 THROMBOPLASTIN TIME PARTIAL: CPT | Performed by: EMERGENCY MEDICINE

## 2018-04-20 PROCEDURE — 99153 MOD SED SAME PHYS/QHP EA: CPT

## 2018-04-20 PROCEDURE — 84484 ASSAY OF TROPONIN QUANT: CPT | Performed by: EMERGENCY MEDICINE

## 2018-04-20 PROCEDURE — 77030004559 HC CATH ANGI DX SUPT CARD -B

## 2018-04-20 PROCEDURE — 74011000250 HC RX REV CODE- 250: Performed by: EMERGENCY MEDICINE

## 2018-04-20 PROCEDURE — 74011250636 HC RX REV CODE- 250/636: Performed by: EMERGENCY MEDICINE

## 2018-04-20 PROCEDURE — 36415 COLL VENOUS BLD VENIPUNCTURE: CPT | Performed by: INTERNAL MEDICINE

## 2018-04-20 PROCEDURE — 85610 PROTHROMBIN TIME: CPT | Performed by: EMERGENCY MEDICINE

## 2018-04-20 PROCEDURE — 74011636320 HC RX REV CODE- 636/320: Performed by: EMERGENCY MEDICINE

## 2018-04-20 PROCEDURE — 74011000258 HC RX REV CODE- 258: Performed by: EMERGENCY MEDICINE

## 2018-04-20 PROCEDURE — 84484 ASSAY OF TROPONIN QUANT: CPT

## 2018-04-20 PROCEDURE — 93458 L HRT ARTERY/VENTRICLE ANGIO: CPT

## 2018-04-20 PROCEDURE — 99218 HC RM OBSERVATION: CPT

## 2018-04-20 PROCEDURE — 85025 COMPLETE CBC W/AUTO DIFF WBC: CPT | Performed by: EMERGENCY MEDICINE

## 2018-04-20 PROCEDURE — C1887 CATHETER, GUIDING: HCPCS

## 2018-04-20 PROCEDURE — 99285 EMERGENCY DEPT VISIT HI MDM: CPT

## 2018-04-20 PROCEDURE — 74011250636 HC RX REV CODE- 250/636

## 2018-04-20 PROCEDURE — 74011000250 HC RX REV CODE- 250: Performed by: INTERNAL MEDICINE

## 2018-04-20 PROCEDURE — C1894 INTRO/SHEATH, NON-LASER: HCPCS

## 2018-04-20 PROCEDURE — 93005 ELECTROCARDIOGRAM TRACING: CPT | Performed by: EMERGENCY MEDICINE

## 2018-04-20 PROCEDURE — 82565 ASSAY OF CREATININE: CPT

## 2018-04-20 PROCEDURE — 74011250637 HC RX REV CODE- 250/637: Performed by: EMERGENCY MEDICINE

## 2018-04-20 PROCEDURE — 77030004558 HC CATH ANGI DX SUPR TORQ CARD -A

## 2018-04-20 PROCEDURE — 74011250636 HC RX REV CODE- 250/636: Performed by: INTERNAL MEDICINE

## 2018-04-20 PROCEDURE — 85379 FIBRIN DEGRADATION QUANT: CPT | Performed by: EMERGENCY MEDICINE

## 2018-04-20 PROCEDURE — 74011636320 HC RX REV CODE- 636/320: Performed by: INTERNAL MEDICINE

## 2018-04-20 PROCEDURE — 99152 MOD SED SAME PHYS/QHP 5/>YRS: CPT

## 2018-04-20 PROCEDURE — 93571 IV DOP VEL&/PRESS C FLO 1ST: CPT

## 2018-04-20 PROCEDURE — 93005 ELECTROCARDIOGRAM TRACING: CPT | Performed by: INTERNAL MEDICINE

## 2018-04-20 PROCEDURE — 74011250637 HC RX REV CODE- 250/637: Performed by: INTERNAL MEDICINE

## 2018-04-20 PROCEDURE — 99285 EMERGENCY DEPT VISIT HI MDM: CPT | Performed by: EMERGENCY MEDICINE

## 2018-04-20 PROCEDURE — 96360 HYDRATION IV INFUSION INIT: CPT | Performed by: EMERGENCY MEDICINE

## 2018-04-20 PROCEDURE — 71260 CT THORAX DX C+: CPT

## 2018-04-20 PROCEDURE — 93308 TTE F-UP OR LMTD: CPT

## 2018-04-20 RX ORDER — GUAIFENESIN 100 MG/5ML
81 LIQUID (ML) ORAL DAILY
Status: DISCONTINUED | OUTPATIENT
Start: 2018-04-21 | End: 2018-04-20 | Stop reason: HOSPADM

## 2018-04-20 RX ORDER — LIDOCAINE HYDROCHLORIDE 20 MG/ML
10 SOLUTION OROPHARYNGEAL ONCE
Status: COMPLETED | OUTPATIENT
Start: 2018-04-20 | End: 2018-04-20

## 2018-04-20 RX ORDER — MORPHINE SULFATE 2 MG/ML
2 INJECTION, SOLUTION INTRAMUSCULAR; INTRAVENOUS
Status: DISCONTINUED | OUTPATIENT
Start: 2018-04-20 | End: 2018-04-20 | Stop reason: HOSPADM

## 2018-04-20 RX ORDER — HEPARIN SODIUM 5000 [USP'U]/ML
5000 INJECTION, SOLUTION INTRAVENOUS; SUBCUTANEOUS EVERY 8 HOURS
Status: DISCONTINUED | OUTPATIENT
Start: 2018-04-20 | End: 2018-04-20

## 2018-04-20 RX ORDER — SODIUM CHLORIDE 0.9 % (FLUSH) 0.9 %
5-10 SYRINGE (ML) INJECTION AS NEEDED
Status: DISCONTINUED | OUTPATIENT
Start: 2018-04-20 | End: 2018-04-20 | Stop reason: HOSPADM

## 2018-04-20 RX ORDER — ASPIRIN 325 MG
325 TABLET ORAL
Status: COMPLETED | OUTPATIENT
Start: 2018-04-20 | End: 2018-04-20

## 2018-04-20 RX ORDER — ACETAMINOPHEN 325 MG/1
650 TABLET ORAL
Status: DISCONTINUED | OUTPATIENT
Start: 2018-04-20 | End: 2018-04-20 | Stop reason: HOSPADM

## 2018-04-20 RX ORDER — HEPARIN SODIUM 10000 [USP'U]/ML
1000-10000 INJECTION, SOLUTION INTRAVENOUS; SUBCUTANEOUS
Status: DISCONTINUED | OUTPATIENT
Start: 2018-04-20 | End: 2018-04-20 | Stop reason: HOSPADM

## 2018-04-20 RX ORDER — LIDOCAINE HYDROCHLORIDE 20 MG/ML
60-140 INJECTION, SOLUTION INFILTRATION; PERINEURAL ONCE
Status: COMPLETED | OUTPATIENT
Start: 2018-04-20 | End: 2018-04-20

## 2018-04-20 RX ORDER — SODIUM CHLORIDE 0.9 % (FLUSH) 0.9 %
5-10 SYRINGE (ML) INJECTION EVERY 8 HOURS
Status: DISCONTINUED | OUTPATIENT
Start: 2018-04-20 | End: 2018-04-20 | Stop reason: HOSPADM

## 2018-04-20 RX ORDER — HEPARIN SODIUM 200 [USP'U]/100ML
3 INJECTION, SOLUTION INTRAVENOUS CONTINUOUS
Status: DISCONTINUED | OUTPATIENT
Start: 2018-04-20 | End: 2018-04-20 | Stop reason: HOSPADM

## 2018-04-20 RX ORDER — NITROGLYCERIN 0.4 MG/1
0.4 TABLET SUBLINGUAL
Status: DISCONTINUED | OUTPATIENT
Start: 2018-04-20 | End: 2018-04-20 | Stop reason: HOSPADM

## 2018-04-20 RX ORDER — HYDROCODONE BITARTRATE AND ACETAMINOPHEN 5; 325 MG/1; MG/1
1 TABLET ORAL
Status: DISCONTINUED | OUTPATIENT
Start: 2018-04-20 | End: 2018-04-20 | Stop reason: HOSPADM

## 2018-04-20 RX ORDER — SODIUM CHLORIDE 9 MG/ML
75 INJECTION, SOLUTION INTRAVENOUS CONTINUOUS
Status: DISCONTINUED | OUTPATIENT
Start: 2018-04-20 | End: 2018-04-20 | Stop reason: HOSPADM

## 2018-04-20 RX ORDER — MORPHINE SULFATE 2 MG/ML
1 INJECTION, SOLUTION INTRAMUSCULAR; INTRAVENOUS
Status: DISCONTINUED | OUTPATIENT
Start: 2018-04-20 | End: 2018-04-20 | Stop reason: HOSPADM

## 2018-04-20 RX ORDER — SODIUM CHLORIDE 0.9 % (FLUSH) 0.9 %
10 SYRINGE (ML) INJECTION
Status: COMPLETED | OUTPATIENT
Start: 2018-04-20 | End: 2018-04-20

## 2018-04-20 RX ORDER — MIDAZOLAM HYDROCHLORIDE 1 MG/ML
.5-2 INJECTION, SOLUTION INTRAMUSCULAR; INTRAVENOUS
Status: DISCONTINUED | OUTPATIENT
Start: 2018-04-20 | End: 2018-04-20 | Stop reason: HOSPADM

## 2018-04-20 RX ORDER — FENTANYL CITRATE 50 UG/ML
25-50 INJECTION, SOLUTION INTRAMUSCULAR; INTRAVENOUS
Status: DISCONTINUED | OUTPATIENT
Start: 2018-04-20 | End: 2018-04-20 | Stop reason: HOSPADM

## 2018-04-20 RX ORDER — NALOXONE HYDROCHLORIDE 0.4 MG/ML
0.4 INJECTION, SOLUTION INTRAMUSCULAR; INTRAVENOUS; SUBCUTANEOUS AS NEEDED
Status: DISCONTINUED | OUTPATIENT
Start: 2018-04-20 | End: 2018-04-20 | Stop reason: HOSPADM

## 2018-04-20 RX ORDER — ATROPINE SULFATE 0.4 MG/ML
0.5 INJECTION, SOLUTION ENDOTRACHEAL; INTRAMEDULLARY; INTRAMUSCULAR; INTRAVENOUS; SUBCUTANEOUS
Status: DISCONTINUED | OUTPATIENT
Start: 2018-04-20 | End: 2018-04-20

## 2018-04-20 RX ORDER — SODIUM CHLORIDE 9 MG/ML
75 INJECTION, SOLUTION INTRAVENOUS CONTINUOUS
Status: DISCONTINUED | OUTPATIENT
Start: 2018-04-20 | End: 2018-04-20

## 2018-04-20 RX ORDER — ONDANSETRON 2 MG/ML
4 INJECTION INTRAMUSCULAR; INTRAVENOUS
Status: DISCONTINUED | OUTPATIENT
Start: 2018-04-20 | End: 2018-04-20 | Stop reason: HOSPADM

## 2018-04-20 RX ADMIN — SODIUM CHLORIDE 1000 ML: 900 INJECTION, SOLUTION INTRAVENOUS at 10:19

## 2018-04-20 RX ADMIN — HEPARIN SODIUM 3 ML/HR: 5000 INJECTION, SOLUTION INTRAVENOUS; SUBCUTANEOUS at 13:38

## 2018-04-20 RX ADMIN — ACETAMINOPHEN 650 MG: 325 TABLET ORAL at 17:20

## 2018-04-20 RX ADMIN — Medication 30 ML: at 10:02

## 2018-04-20 RX ADMIN — IOPAMIDOL 90 ML: 755 INJECTION, SOLUTION INTRAVENOUS at 14:18

## 2018-04-20 RX ADMIN — Medication 10 ML: at 10:58

## 2018-04-20 RX ADMIN — Medication 30 ML: at 17:21

## 2018-04-20 RX ADMIN — MIDAZOLAM HYDROCHLORIDE 2 MG: 1 INJECTION, SOLUTION INTRAMUSCULAR; INTRAVENOUS at 13:46

## 2018-04-20 RX ADMIN — FENTANYL CITRATE 50 MCG: 50 INJECTION, SOLUTION INTRAMUSCULAR; INTRAVENOUS at 13:47

## 2018-04-20 RX ADMIN — LIDOCAINE HYDROCHLORIDE 10 ML: 20 SOLUTION ORAL; TOPICAL at 10:02

## 2018-04-20 RX ADMIN — VERAPAMIL HYDROCHLORIDE 2 ML: 2.5 INJECTION INTRAVENOUS at 13:51

## 2018-04-20 RX ADMIN — Medication 5 ML: at 15:16

## 2018-04-20 RX ADMIN — HEPARIN SODIUM 3000 UNITS: 10000 INJECTION, SOLUTION INTRAVENOUS; SUBCUTANEOUS at 14:05

## 2018-04-20 RX ADMIN — IOPAMIDOL 100 ML: 755 INJECTION, SOLUTION INTRAVENOUS at 10:58

## 2018-04-20 RX ADMIN — SODIUM CHLORIDE 100 ML: 900 INJECTION, SOLUTION INTRAVENOUS at 10:58

## 2018-04-20 RX ADMIN — LIDOCAINE HYDROCHLORIDE 80 MG: 20 INJECTION, SOLUTION INFILTRATION; PERINEURAL at 13:25

## 2018-04-20 RX ADMIN — ASPIRIN 325 MG ORAL TABLET 325 MG: 325 PILL ORAL at 12:47

## 2018-04-20 RX ADMIN — NITROGLYCERIN 0.1 MG: 200 INJECTION, SOLUTION INTRAVENOUS at 14:27

## 2018-04-20 RX ADMIN — SODIUM CHLORIDE 1000 ML: 900 INJECTION, SOLUTION INTRAVENOUS at 10:30

## 2018-04-20 NOTE — PROGRESS NOTES
Verbal bedside report given to dane Frias RN. Patient's situation, background, assessment and recommendations provided. Opportunity for questions provided. Oncoming RN assumed care of patient. Right site visualized.

## 2018-04-20 NOTE — IP AVS SNAPSHOT
[address] [address] 
[phone] Patient: Marquez Gonzalez MRN: [id number] :1955 About your hospitalization You were admitted on:  2018 You last received care in the:  [facility] 3 CLINICAL OBSERVATION You were discharged on:  2018 Why you were hospitalized Your primary diagnosis was:  Chest Pain Your diagnoses also included:  Ventral Hernia, Near Syncope, Tobacco Abuse Follow-up Information Follow up With Details Comments Contact Info None   None ([phone]) Patient stated that they have no PCP Discharge Orders None A check james indicates which time of day the medication should be taken. My Medications Notice You have not been prescribed any medications. Discharge Instructions Cardiac Catheterization/Angiography Discharge Instructions *Check the puncture site frequently for swelling or bleeding. If you see any bleeding, lie down and apply pressure over the area with a clean town or washcloth. Notify your doctor for any redness, swelling, drainage or oozing from the puncture site. Notify your doctor for any fever or chills. *If the leg or arm with the puncture becomes cold, numb or painful, call [facility] at  [phone] *Activity should be limited for the next 48 hours. Climb stairs as little as possible and avoid any stooping, bending or strenuous activity for 48 hours. No heavy lifting (anything over 10 pounds) for three days. *Do not drive for 48 hours. *You may resume your usual diet. Drink more fluids than usual. 
 
*Have a responsible person drive you home and stay with you for at least 24 hours after your heart catheterization/angiography. *You may remove the bandage from your Right and Arm in 24 hours. You may shower in 24 hours. No tub baths, hot tubs or swimming for one week.  Do not place any lotions, creams, powders, ointments over the puncture site for one week. You may place a clean band-aid over the puncture site each day for 5 days. Change this daily. Celtro Announcement We are excited to announce that we are making your provider's discharge notes available to you in Celtro. You will see these notes when they are completed and signed by the physician that discharged you from your recent hospital stay. If you have any questions or concerns about any information you see in Celtro, please call the Health Information Department where you were seen or reach out to your Primary Care Provider for more information about your plan of care. Introducing [facility]! [facility] introduces Celtro patient portal. Now you can access parts of your medical record, email your doctor's office, and request medication refills online. 1. In your internet browser, go to [email or web address] 2. Click on the First Time User? Click Here link in the Sign In box. You will see the New Member Sign Up page. 3. Enter your Celtro Access Code exactly as it appears below. You will not need to use this code after youve completed the sign-up process. If you do not sign up before the expiration date, you must request a new code. · Celtro Access Code: [email or web address] Expires: 7/19/2018  9:40 AM 
 
4. Enter the last four digits of your Social Security Number (xxxx) and Date of Birth (mm/dd/yyyy) as indicated and click Submit. You will be taken to the next sign-up page. 5. Create a eyeOSt ID. This will be your Celtro login ID and cannot be changed, so think of one that is secure and easy to remember. 6. Create a Celtro password. You can change your password at any time. 7. Enter your Password Reset Question and Answer. This can be used at a later time if you forget your password. 8. Enter your e-mail address. You will receive e-mail notification when new information is available in [address] 9. Click Sign Up. You can now view and download portions of your medical record. 10. Click the Download Summary menu link to download a portable copy of your medical information. If you have questions, please visit the Frequently Asked Questions section of the iSpecimenhart website. Remember, SpecifiedBy is NOT to be used for urgent needs. For medical emergencies, dial 911. Now available from your iPhone and Android! Introducing Gilberto Pedro As a Spout patient, I wanted to make you aware of our electronic visit tool called Gilberto Pedro. Spout 24/7 allows you to connect within minutes with a medical provider 24 hours a day, seven days a week via a mobile device or tablet or logging into a secure website from your computer. You can access Gilberto Pedro from anywhere in the [place]. A virtual visit might be right for you when you have a simple condition and feel like you just dont want to get out of bed, or cant get away from work for an appointment, when your regular [facility] provider is not available (evenings, weekends or holidays), or when youre out of town and need minor care. Electronic visits cost only $49 and if the [facility] provider determines a prescription is needed to treat your condition, one can be electronically transmitted to a nearby pharmacy*. Please take a moment to enroll today if you have not already done so. The enrollment process is free and takes just a few minutes. To enroll, please download the GreenTrapOnline/7 ganesh to your tablet or phone, or visit [email or web address] to enroll on your computer.    
And, as an [address] [place] [address] patient with a Ztory account, the results of your visits will be scanned into your electronic medical record and your primary care provider will be able to view the scanned results. We urge you to continue to see your regular [employer] provider for your ongoing medical care. And while your primary care provider may not be the one available when you seek a OptiSynxbarryfin virtual visit, the peace of mind you get from getting a real diagnosis real time can be priceless. For more information on [employer], view our Frequently Asked Questions (FAQs) at [email or web address]. Sincerely, 
 
Micki Escobedo MD 
Chief Medical Officer [facility] Sally Miles *:  certain medications cannot be prescribed via OptiSynxbarryfin Providers Seen During Your Hospitalization Provider Specialty Primary office phone Melissa Savage MD Emergency Medicine [phone] Praveen Andino MD Cardiology [phone] Your Primary Care Physician (PCP) Primary Care Physician Office Phone Office Fax NONE ** None ** ** None ** You are allergic to the following No active allergies Recent Documentation Height Weight BMI Smoking Status 1.753 m 65.8 kg 21.41 kg/m2 Current Every Day Smoker Emergency Contacts Name Discharge Info Relation Home Work Mobile Isabel Ulloa  Friend [5] [phone] Kyra Resendiz  Other Relative [6] [phone] Melody Negron  Brother [24] [phone] Patient Belongings The following personal items are in your possession at time of discharge: 
                             
 
  
  
 Please provide this summary of care documentation to your next provider. Signatures-by signing, you are acknowledging that this After Visit Summary has been reviewed with you and you have received a copy. Patient Signature:  ____________________________________________________________ Date:  ____________________________________________________________  
  
Bertha Mata  Provider Signature: ____________________________________________________________ Date:  ____________________________________________________________

## 2018-04-20 NOTE — H&P
[facility] Cardiology H&P    Admitting Cardiologist:Dr. Holcomb    Primary Cardiologist:none    Primary Care Physician:none    Subjective:     Kanchan Belle is a 65 y.o. male who presents to ER today after waking up with left arm discomfort radiating to chest. No associated nausea, vomiting diaphoresis. No prior history of cardiac events per pt but EMR notes prior MI. He is on no home medications. Denies hx of HTN, DM, ? Prior CVA several years ago. He has large ventral hernia with previous repair but wears abdominal binder. He was given GI cocktail by ER staff for ongoing CP with negative troponin level and CT chest ruled out for PE. Approximately 5 minutes after GI cocktail pt became profusely diaphoretic, nauseated and reportedly bradycardic with HR in 40's. He has received 1.8 liter bolus with improved HR and BP but he has persistent chest discomfort. + hx of cigarrette use, denies illicit drug use, however EMR also noted prior cocaine use by social hx. Past Medical History:   Diagnosis Date    Cardiomegaly     hernia     MI (myocardial infarction) ([address]     5-6yrs ago      Past Surgical History:   Procedure Laterality Date    ABDOMEN SURGERY PROC UNLISTED      hernia    HX OTHER SURGICAL        Current Facility-Administered Medications   Medication Dose Route Frequency    GI Cocktail  30 mL Oral Q6H     No current outpatient prescriptions on file. No Known Allergies   Social History   Substance Use Topics    Smoking status: Current Every Day Smoker     Packs/day: 0.50    Smokeless tobacco: Not on file    Alcohol use No      No family hx of CAD     Review of Systems  Gen: Denies fever, chills, malaise or fatigue. Appetite good.    HEENT: Denies frequent headaches, dizzyness, visual disturbances, Neck pain or swallowing difficulty  Lungs: Denies shortness of breath, hx of COPD, breathing problems  Cardiovascular: as above, no orthopnea, no PND   GI: Denies hememesis, dark tarry stools, No prior Hx of GI bleed, Denies constipation  : Denies dysuria, no complaints of frequency, nocturia  Heme: No prior bleeding disorders, no prior Cancer  Neuro: ? Remote CVA several years ago with left sided weakness--he did not seek medical attention at that time. Endocrine: no diabetes, thyroid disorders  Psychiatric: Denies anxiety, or other psychiatric illnesses. Objective:     Visit Vitals    /84    Pulse 68    Temp 98.6 °F (37 °C)    Resp 20    Ht 5' 9\" (1.753 m)    Wt 65.8 kg (145 lb)    SpO2 96%    BMI 21.41 kg/m2     General:Alert, cooperative, no distress, appears stated age  Head: Normocephalic, without obvious abnormality, atraumatic. Eyes: Conjunctivae/corneas clear. PERRL, EOMs intact  Nose:Nares normal. Septum midline. Mucosa normal. No drainage or sinus tenderness. Throat: Lips, mucosa, and tongue normal. Teeth and gums normal.   Neck: Supple, symmetrical, trachea midline,  no carotid bruit and no JVD. Lungs:Clear to auscultation bilaterally. Chest wall: No tenderness or deformity. Heart: Regular rate and rhythm, S1, S2 normal, no murmur, click, rub or gallop. Abdomen:Soft, non-tender. Bowel sounds normal. No masses, No organomegaly. Extremities: Extremities normal, atraumatic, no cyanosis or edema. Pulses: 2+ and symmetric all extremities. Skin: Skin color, texture, turgor normal. No rashes or lesions  Lymph nodes: Cervical, supraclavicular, and axillary nodes normal  Neurologic:No focal deficits identified                 ECG: NSR with nonspecific st/ t wave changes.      Data Review:     Recent Results (from the past 24 hour(s))   EKG, 12 LEAD, INITIAL    Collection Time: 04/20/18  9:38 AM   Result Value Ref Range    Ventricular Rate 52 BPM    Atrial Rate 52 BPM    P-R Interval 140 ms    QRS Duration 98 ms    Q-T Interval 450 ms    QTC Calculation (Bezet) 418 ms    Calculated P Axis 67 degrees    Calculated R Axis 59 degrees    Calculated T Axis 61 degrees    Diagnosis !! AGE AND GENDER SPECIFIC ECG ANALYSIS !! Sinus bradycardia  Nonspecific ST abnormality  Abnormal ECG  When compared with ECG of 01-MAY-2017 00:27,  Vent. rate has decreased BY  40 BPM  ST no longer elevated in Lateral leads  Confirmed by [facility] STEPHIE PIERRE (), CADEN LUCERO ([id number]) on 4/20/2018 99:96:68 PM     METABOLIC PANEL, COMPREHENSIVE    Collection Time: 04/20/18  9:52 AM   Result Value Ref Range    Sodium 142 136 - 145 mmol/L    Potassium 3.8 3.5 - 5.1 mmol/L    Chloride 108 (H) 98 - 107 mmol/L    CO2 28 21 - 32 mmol/L    Anion gap 6 (L) 7 - 16 mmol/L    Glucose 114 (H) 65 - 100 mg/dL    BUN 25 (H) 8 - 23 MG/DL    Creatinine 1.29 0.8 - 1.5 MG/DL    GFR est AA >60 >60 ml/min/1.73m2    GFR est non-AA >60 >60 ml/min/1.73m2    Calcium 8.3 8.3 - 10.4 MG/DL    Bilirubin, total 0.3 0.2 - 1.1 MG/DL    ALT (SGPT) 24 12 - 65 U/L    AST (SGOT) 16 15 - 37 U/L    Alk. phosphatase 83 50 - 136 U/L    Protein, total 6.4 6.3 - 8.2 g/dL    Albumin 3.4 3.2 - 4.6 g/dL    Globulin 3.0 2.3 - 3.5 g/dL    A-G Ratio 1.1 (L) 1.2 - 3.5     CBC WITH AUTOMATED DIFF    Collection Time: 04/20/18  9:52 AM   Result Value Ref Range    WBC 5.7 4.3 - 11.1 K/uL    RBC 4.32 4.23 - 5.67 M/uL    HGB 12.6 (L) 13.6 - 17.2 g/dL    HCT 38.1 (L) 41.1 - 50.3 %    MCV 88.2 79.6 - 97.8 FL    MCH 29.2 26.1 - 32.9 PG    MCHC 33.1 31.4 - 35.0 g/dL    RDW 13.4 11.9 - 14.6 %    PLATELET 451 709 - 559 K/uL    MPV 8.9 (L) 10.8 - 14.1 FL    DF AUTOMATED      NEUTROPHILS 62 43 - 78 %    LYMPHOCYTES 30 13 - 44 %    MONOCYTES 5 4.0 - 12.0 %    EOSINOPHILS 2 0.5 - 7.8 %    BASOPHILS 1 0.0 - 2.0 %    IMMATURE GRANULOCYTES 0 0.0 - 5.0 %    ABS. NEUTROPHILS 3.5 1.7 - 8.2 K/UL    ABS. LYMPHOCYTES 1.7 0.5 - 4.6 K/UL    ABS. MONOCYTES 0.3 0.1 - 1.3 K/UL    ABS. EOSINOPHILS 0.1 0.0 - 0.8 K/UL    ABS. BASOPHILS 0.1 0.0 - 0.2 K/UL    ABS. IMM.  GRANS. 0.0 0.0 - 0.5 K/UL   D DIMER    Collection Time: 04/20/18  9:52 AM   Result Value Ref Range    D DIMER 0.41 <0.56 ug/ml(FEU) TROPONIN I    Collection Time: 04/20/18  9:52 AM   Result Value Ref Range    Troponin-I, Qt. <0.02 (L) 0.02 - 0.05 NG/ML   TROPONIN I    Collection Time: 04/20/18  9:52 AM   Result Value Ref Range    Troponin-I, Qt. <0.02 (L) 0.02 - 0.05 NG/ML   POC TROPONIN-I    Collection Time: 04/20/18  9:57 AM   Result Value Ref Range    Troponin-I (POC) 0 (L) 0.02 - 0.05 ng/ml   EKG, 12 LEAD, SUBSEQUENT    Collection Time: 04/20/18 10:19 AM   Result Value Ref Range    Ventricular Rate 48 BPM    Atrial Rate 48 BPM    P-R Interval 164 ms    QRS Duration 102 ms    Q-T Interval 494 ms    QTC Calculation (Bezet) 441 ms    Calculated P Axis 70 degrees    Calculated R Axis 62 degrees    Calculated T Axis 46 degrees    Diagnosis       !! AGE AND GENDER SPECIFIC ECG ANALYSIS !! Marked sinus bradycardia with Premature supraventricular complexes  Nonspecific ST and T wave abnormality  Abnormal ECG  When compared with ECG of 20-APR-2018 09:38,  Premature supraventricular complexes are now Present  Confirmed by Che Arreola MD (), CADEN LUCERO ([id number]) on 4/20/2018 12:13:21 PM     MRI/CT POC CREATININE    Collection Time: 04/20/18 10:44 AM   Result Value Ref Range    Creatinine (POC) 1.3 0.8 - 1.5 mg/dL    GFRAA, POC >60 >60 ml/min/1.73m2    GFRNA, POC 60 (L) >60 ml/min/1.73m2   POC TROPONIN-I    Collection Time: 04/20/18 12:18 PM   Result Value Ref Range    Troponin-I (POC) 0.01 (L) 0.02 - 0.05 ng/ml         Assessment / Plan     Principal Problem:    Chest pain (4/20/2018)--will admit to telemetry, check serial enzymes. plan definitive C possible PCI given presentation today ? Prior cocaine use--pt denied, will check UDS. Active Problems:    Ventral hernia (4/20/2018)--having normal BMs. Normal appetite. Near syncope (4/20/2018)--Likely vaso vagal occurring just after administration of GI cocktail with resulting bradycardia and hypotension. Will monitor closely but no BP meds or BB given this.      Ana Richmond NP    ATTENDING ADDENDUM:     Patient seen and examined by me. Agree with admit note by physician extender. Key findings are:  No CHF or arrhythmia symptoms but recurrent left sided and substernal chest pressure off and on, with radiation to left arm. Poor historian, but also with SOB and SHEIKH and crack cocaine use 2 days ago. ECG with inferolateral NSSTTW changes. Trop and CTA in ER are negative but still with symptoms. Also reports mild worsening of pain with supine posture, improved sitting upright, but no rub on exam and no pericarditis appearance of ECG   CV- RRR without murmur  Lungs- Clear bilaterally  Abd- soft, nontender, nondistended  Ext- no edema     Plan: As above. C with possible PCI today. No beta blocker with recent cocaine use. PPI and echo for pericardial effusion if cath OK.     SULTANA Asher MD  [facility] Cardiology  Pager [phone]

## 2018-04-20 NOTE — DISCHARGE INSTRUCTIONS
Cardiac Catheterization/Angiography Discharge Instructions    *Check the puncture site frequently for swelling or bleeding. If you see any bleeding, lie down and apply pressure over the area with a clean town or washcloth. Notify your doctor for any redness, swelling, drainage or oozing from the puncture site. Notify your doctor for any fever or chills. *If the leg or arm with the puncture becomes cold, numb or painful, call [address] Cardiology at  [phone]    *Activity should be limited for the next 48 hours. Climb stairs as little as possible and avoid any stooping, bending or strenuous activity for 48 hours. No heavy lifting (anything over 10 pounds) for three days. *Do not drive for 48 hours. *You may resume your usual diet. Drink more fluids than usual.    *Have a responsible person drive you home and stay with you for at least 24 hours after your heart catheterization/angiography. *You may remove the bandage from your Right and Arm in 24 hours. You may shower in 24 hours. No tub baths, hot tubs or swimming for one week. Do not place any lotions, creams, powders, ointments over the puncture site for one week. You may place a clean band-aid over the puncture site each day for 5 days. Change this daily.

## 2018-04-20 NOTE — PROCEDURES
Brief Cardiac Procedure Note    Patient: Tiny Sunshine MRN: [id number]  SSN: [id number]    YOB: 1955  Age: 65 y.o. Sex: male      Date of Procedure: 4/20/2018     Pre-procedure Diagnosis: Unstable Angina    Post-procedure Diagnosis: Non-cardiac Chest Pain    Reason for Procedure: Other: Unstable angina    Procedure: Left Heart Catheterization    Brief Description of Procedure: LHC via R radial artery    Performed By: Mitali De La Torre MD     Assistants: None    Anesthesia: Moderate Sedation    Estimated Blood Loss: Less than 10 mL      Specimens: None    Implants: None    Findings: LM normal, LAD normal, Circ normal, mRCA with 60% stenosis, iFR of 1.00 non-significant. LVEF 45-68%    Complications: None    Recommendations: Continue medical therapy.     Signed By: Mitali De La Torre MD     April 20, 2018

## 2018-04-20 NOTE — PROGRESS NOTES
Discharge instructions reviewed with patient. Prescriptions given for no medicatoin and med info sheets provided for all new medications. Opportunity for questions provided. Patient voiced understanding of all discharge instructions. IVs removed and monitor off by primary RN. Patient awaiting transport from family.

## 2018-04-20 NOTE — PROGRESS NOTES
TRANSFER - OUT REPORT:    R radial diagnostic [facility] with Dr Reuben Cervantes  Heparin 3000units  Versed 2mg   Fentanyl 50mcg  Pressure wire study negative  Tr band 13ml at 1420  No bleeding or hematoma noted at site. Site soft    Verbal report given to Kati(name) luci Real  being transferred to [facility](unit) for routine progression of care       Report consisted of patients Situation, Background, Assessment and   Recommendations(SBAR). Information from the following report(s) Procedure Summary was reviewed with the receiving nurse. Lines:   Peripheral IV 04/20/18 Right Forearm (Active)   Site Assessment Clean, dry, & intact 4/20/2018 11:18 AM   Dressing Status Clean, dry, & intact 4/20/2018 11:18 AM       Peripheral IV 04/20/18 Left Antecubital (Active)   Site Assessment Clean, dry, & intact 4/20/2018 11:18 AM   Dressing Status Clean, dry, & intact 4/20/2018 11:18 AM        Opportunity for questions and clarification was provided.       Patient transported with:   Registered Nurse

## 2018-04-20 NOTE — PROGRESS NOTES
ATTENDING ADDENDUM:    Patient seen and examined by me. Agree with admit note by physician extender. Key findings are:  No CHF or arrhythmia symptoms but recurrent left sided and substernal chest pressure off and on, with radiation to left arm. Poor historian, but also with SOB and SHEIKH and crack cocaine use 2 days ago. ECG with inferolateral NSSTTW changes. Trop and CTA in ER are negative but still with symptoms. Also reports mild worsening of pain with supine posture, improved sitting upright, but no rub on exam and no pericarditis appearance of ECG   CV- RRR without murmur  Lungs- Clear bilaterally  Abd- soft, nontender, nondistended  Ext- no edema    Plan: As above. C with possible PCI today. No beta blocker with recent cocaine use. PPI and echo for pericardial effusion if cath OK.     Nate Sun MD  [address] Cardiology  Pager [phone]

## 2018-04-20 NOTE — PROGRESS NOTES
Patient with complaint of pain when breathing in and chest pain. Patient received GI cocktail and tylenol (see MAR). Chip Carrera NP notified and orders received to continue with discharge when patient's TR band is removed.

## 2018-04-20 NOTE — IP AVS SNAPSHOT
[address] [place] [id number] 
[phone] Patient: Yazmin Moy MRN: [id number] :1955 A check james indicates which time of day the medication should be taken. My Medications Notice You have not been prescribed any medications.

## 2018-04-20 NOTE — PROGRESS NOTES
TRANSFER - IN REPORT:    Verbal report received from ERUM Nguyen on Petra Real  being received from [address] [place] [address] for routine progression of care. Report consisted of patients Situation, Background, Assessment and   Recommendations(SBAR). Information from the following reports was reviewed: Kardex, Procedure Summary, MAR and Recent Results. Opportunity for questions and clarification was provided. Assessment completed upon patients arrival to unit and care assumed. Patient received to room 330 and assessment completed. Patient connected to telemetry monitor and eagle with BP cycling every 15 minutes. Patient oriented to room and plan of care reviewed. Patient voiced understanding of keeping wrist immobilized. Right radial site benign, dressing dry and intact, no hematoma; R band in place. Patient provided with clear liquids. Patient aware to use call light to communicate needs. Instructed patient to not use arm for any pushing or pulling. Admission skin assessment completed with second RN and reveals the following: Patient's heels are intact, no breakdown noted. BLE with no edema noted, scattered abrasions. Sacrum intact, no redness noted.

## 2018-04-20 NOTE — Clinical Note
Clear liquid diet for tonight's diet as tolerated tomorrow follow-up with primary care physician as soon as possible. We'll also refer you to a surgeon for your hernia.

## 2018-04-20 NOTE — DISCHARGE SUMMARY
Physician Discharge Summary     Patient ID:  Archana Collins  [id number]  6 y.o.  1955    Admit date: 4/20/2018    Discharge date and time: 4/20/18    Admitting Physician: Kaylan Jimenes MD     Primary Cardiologist:none     Primary Care MD:None    Discharge Physician: Alexia Carias NP    Admission Diagnoses: Chest pain    Discharge Diagnoses:   Patient Active Problem List    Diagnosis Date Noted    Chest pain 04/20/2018    Ventral hernia 04/20/2018    Near syncope 04/20/2018    Tobacco abuse 04/20/2018           Hospital Course: Archana Collins was admitted to the hospital on 4/20/2018 with complaints of chest pain. Serial cardiac enzymes were negative for myocardial infarction. Archana Collins is a 65 y.o. male who presents to ER today after waking up with left arm discomfort radiating to chest. No associated nausea, vomiting diaphoresis. No prior history of cardiac events per pt but EMR notes prior MI. He is on no home medications. Denies hx of HTN, DM, ? Prior CVA several years ago. He has large ventral hernia with previous repair but wears abdominal binder. He was given GI cocktail by ER staff for ongoing CP with negative troponin level and CT chest ruled out for PE. Approximately 5 minutes after GI cocktail pt became profusely diaphoretic, nauseated and reportedly bradycardic with HR in 40's. He has received 1.8 liter bolus with improved HR and BP but he has persistent chest discomfort. + hx of cigarrette use, denies illicit drug use, however EMR also noted prior cocaine use by social hx. Cardiac catheterization revealed no evidence of obstructive CAD. Limited echo noted no evidence of pericardial effusion. He will be discharged home today after bedrest complete. He has been instructed to return to ER for syncope.  Encouraged to follow up with Primary care MD.        Discharge Exam:     Visit Vitals    /70 (BP Patient Position: At rest)    Pulse 61    Temp 98.2 °F (36.8 °C)  Resp 16    Ht 5' 9\" (1.753 m)    Wt 65.8 kg (145 lb)    SpO2 98%    BMI 21.41 kg/m2     General Appearance:  Well developed, well nourished,alert and oriented x 3, and individual in no acute distress. Ears/Nose/Mouth/Throat:   Hearing grossly normal.         Neck: Supple. Chest:   Lungs clear to auscultation bilaterally. Cardiovascular:  Regular rate and rhythm, S1, S2 normal, no murmur. Abdomen:   Soft, non-tender, bowel sounds are active. Extremities: No edema bilaterally. Skin: Warm and dry. Final Laboratory Data:  Recent Results (from the past 24 hour(s))   EKG, 12 LEAD, INITIAL    Collection Time: 04/20/18  9:38 AM   Result Value Ref Range    Ventricular Rate 52 BPM    Atrial Rate 52 BPM    P-R Interval 140 ms    QRS Duration 98 ms    Q-T Interval 450 ms    QTC Calculation (Bezet) 418 ms    Calculated P Axis 67 degrees    Calculated R Axis 59 degrees    Calculated T Axis 61 degrees    Diagnosis       !! AGE AND GENDER SPECIFIC ECG ANALYSIS !! Sinus bradycardia  Nonspecific ST abnormality  Abnormal ECG  When compared with ECG of 01-MAY-2017 00:27,  Vent. rate has decreased BY  40 BPM  ST no longer elevated in Lateral leads  Confirmed by [facility] STEPHIE PIERRE (), CADEN LUCERO ([id number]) on 4/20/2018 58:02:90 PM     METABOLIC PANEL, COMPREHENSIVE    Collection Time: 04/20/18  9:52 AM   Result Value Ref Range    Sodium 142 136 - 145 mmol/L    Potassium 3.8 3.5 - 5.1 mmol/L    Chloride 108 (H) 98 - 107 mmol/L    CO2 28 21 - 32 mmol/L    Anion gap 6 (L) 7 - 16 mmol/L    Glucose 114 (H) 65 - 100 mg/dL    BUN 25 (H) 8 - 23 MG/DL    Creatinine 1.29 0.8 - 1.5 MG/DL    GFR est AA >60 >60 ml/min/1.73m2    GFR est non-AA >60 >60 ml/min/1.73m2    Calcium 8.3 8.3 - 10.4 MG/DL    Bilirubin, total 0.3 0.2 - 1.1 MG/DL    ALT (SGPT) 24 12 - 65 U/L    AST (SGOT) 16 15 - 37 U/L    Alk.  phosphatase 83 50 - 136 U/L    Protein, total 6.4 6.3 - 8.2 g/dL    Albumin 3.4 3.2 - 4.6 g/dL    Globulin 3.0 2.3 - 3.5 g/dL A-G Ratio 1.1 (L) 1.2 - 3.5     CBC WITH AUTOMATED DIFF    Collection Time: 04/20/18  9:52 AM   Result Value Ref Range    WBC 5.7 4.3 - 11.1 K/uL    RBC 4.32 4.23 - 5.67 M/uL    HGB 12.6 (L) 13.6 - 17.2 g/dL    HCT 38.1 (L) 41.1 - 50.3 %    MCV 88.2 79.6 - 97.8 FL    MCH 29.2 26.1 - 32.9 PG    MCHC 33.1 31.4 - 35.0 g/dL    RDW 13.4 11.9 - 14.6 %    PLATELET 466 840 - 414 K/uL    MPV 8.9 (L) 10.8 - 14.1 FL    DF AUTOMATED      NEUTROPHILS 62 43 - 78 %    LYMPHOCYTES 30 13 - 44 %    MONOCYTES 5 4.0 - 12.0 %    EOSINOPHILS 2 0.5 - 7.8 %    BASOPHILS 1 0.0 - 2.0 %    IMMATURE GRANULOCYTES 0 0.0 - 5.0 %    ABS. NEUTROPHILS 3.5 1.7 - 8.2 K/UL    ABS. LYMPHOCYTES 1.7 0.5 - 4.6 K/UL    ABS. MONOCYTES 0.3 0.1 - 1.3 K/UL    ABS. EOSINOPHILS 0.1 0.0 - 0.8 K/UL    ABS. BASOPHILS 0.1 0.0 - 0.2 K/UL    ABS. IMM. GRANS. 0.0 0.0 - 0.5 K/UL   D DIMER    Collection Time: 04/20/18  9:52 AM   Result Value Ref Range    D DIMER 0.41 <0.56 ug/ml(FEU)   TROPONIN I    Collection Time: 04/20/18  9:52 AM   Result Value Ref Range    Troponin-I, Qt. <0.02 (L) 0.02 - 0.05 NG/ML   TROPONIN I    Collection Time: 04/20/18  9:52 AM   Result Value Ref Range    Troponin-I, Qt. <0.02 (L) 0.02 - 0.05 NG/ML   POC TROPONIN-I    Collection Time: 04/20/18  9:57 AM   Result Value Ref Range    Troponin-I (POC) 0 (L) 0.02 - 0.05 ng/ml   EKG, 12 LEAD, SUBSEQUENT    Collection Time: 04/20/18 10:19 AM   Result Value Ref Range    Ventricular Rate 48 BPM    Atrial Rate 48 BPM    P-R Interval 164 ms    QRS Duration 102 ms    Q-T Interval 494 ms    QTC Calculation (Bezet) 441 ms    Calculated P Axis 70 degrees    Calculated R Axis 62 degrees    Calculated T Axis 46 degrees    Diagnosis       !! AGE AND GENDER SPECIFIC ECG ANALYSIS !!   Marked sinus bradycardia with Premature supraventricular complexes  Nonspecific ST and T wave abnormality  Abnormal ECG  When compared with ECG of 20-APR-2018 09:38,  Premature supraventricular complexes are now Present  Confirmed by Shyanne Allen MD (), CADEN LUCERO ([id number]) on 4/20/2018 12:13:21 PM     MRI/CT POC CREATININE    Collection Time: 04/20/18 10:44 AM   Result Value Ref Range    Creatinine (POC) 1.3 0.8 - 1.5 mg/dL    GFRAA, POC >60 >60 ml/min/1.73m2    GFRNA, POC 60 (L) >60 ml/min/1.73m2   POC TROPONIN-I    Collection Time: 04/20/18 12:18 PM   Result Value Ref Range    Troponin-I (POC) 0.01 (L) 0.02 - 0.05 ng/ml   PROTHROMBIN TIME + INR    Collection Time: 04/20/18 12:56 PM   Result Value Ref Range    Prothrombin time 12.5 11.5 - 14.5 sec    INR 1.0     PTT    Collection Time: 04/20/18 12:56 PM   Result Value Ref Range    aPTT 26.7 23.2 - 35.3 SEC       Disposition: home    Patient Instructions: There are no discharge medications for this patient. Referenced discharge instructions provided by nursing for diet and activity. Follow-up:    PCP: (None) in about 4 weeks.     Signed:  Hermila Baez NP  4/20/2018  3:34 PM

## 2018-04-20 NOTE — PROGRESS NOTES
Spoke with patient's  who is here.     Craig Jenkins, staff [address], [phone]   /   [email or web address]

## 2018-04-20 NOTE — IP AVS SNAPSHOT
Summary of Care Report The Summary of Care report has been created to help improve care coordination. Users with access to [employer] or [address] (Web-based application) may access additional patient information including the Discharge Summary. If you are not currently a [employer] user and need more information, please call the number listed below in the [place] [address] section and ask to be connected with Medical Records. Facility Information Name Address Phone [address] [address] [address] [phone] Patient Information Patient Name Sex CABRERA Frost ([id number]) Male 1955 Discharge Information Admitting Provider Service Area Unit Sally Cuevas MD / Greg JURADO [phone] Clinical Obs / [phone] Discharge Provider Discharge Date/Time Discharge Disposition Destination (none) 2018 (Pending) AHR (none) Patient Language Language ENGLISH [13] Hospital Problems as of 2018  Never Reviewed Class Noted - Resolved Last Modified POA Active Problems * (Principal)Chest pain  2018 - Present 2018 by Hermila Baez, NP Unknown Entered by Hermilaradha Baez, NP Ventral hernia  2018 - Present 2018 by Hermila Baez, NP Unknown Entered by Hermilaradha Baez, NP Near syncope  2018 - Present 2018 by Hermila Baez, NP Unknown Entered by Hermila Sale, NP Tobacco abuse  2018 - Present 2018 by Hermila Baez, NP Unknown Entered by Hermila Baez, NP You are allergic to the following No active allergies Current Discharge Medication List  
  
Notice You have not been prescribed any medications. Follow-up Information Follow up With Details Comments Contact Info None   None (395) Patient stated that they have no PCP Discharge Instructions Cardiac Catheterization/Angiography Discharge Instructions *Check the puncture site frequently for swelling or bleeding. If you see any bleeding, lie down and apply pressure over the area with a clean town or washcloth. Notify your doctor for any redness, swelling, drainage or oozing from the puncture site. Notify your doctor for any fever or chills. *If the leg or arm with the puncture becomes cold, numb or painful, call [facility] at  [phone] *Activity should be limited for the next 48 hours. Climb stairs as little as possible and avoid any stooping, bending or strenuous activity for 48 hours. No heavy lifting (anything over 10 pounds) for three days. *Do not drive for 48 hours. *You may resume your usual diet. Drink more fluids than usual. 
 
*Have a responsible person drive you home and stay with you for at least 24 hours after your heart catheterization/angiography. *You may remove the bandage from your Right and Arm in 24 hours. You may shower in 24 hours. No tub baths, hot tubs or swimming for one week. Do not place any lotions, creams, powders, ointments over the puncture site for one week. You may place a clean band-aid over the puncture site each day for 5 days. Change this daily. Chart Review Routing History No Routing History on File

## 2018-04-21 ENCOUNTER — APPOINTMENT (OUTPATIENT)
Dept: GENERAL RADIOLOGY | Age: 63
End: 2018-04-21
Payer: SELF-PAY

## 2018-04-21 ENCOUNTER — APPOINTMENT (OUTPATIENT)
Dept: CT IMAGING | Age: 63
End: 2018-04-21
Payer: SELF-PAY

## 2018-04-21 VITALS
HEART RATE: 104 BPM | TEMPERATURE: 99 F | OXYGEN SATURATION: 92 % | RESPIRATION RATE: 16 BRPM | SYSTOLIC BLOOD PRESSURE: 112 MMHG | HEIGHT: 69 IN | DIASTOLIC BLOOD PRESSURE: 70 MMHG | BODY MASS INDEX: 21.45 KG/M2 | WEIGHT: 144.84 LBS

## 2018-04-21 LAB
ANION GAP SERPL CALC-SCNC: 6 MMOL/L (ref 7–16)
ATRIAL RATE: 89 BPM
BASOPHILS # BLD: 0 K/UL (ref 0–0.2)
BASOPHILS NFR BLD: 0 % (ref 0–2)
BUN SERPL-MCNC: 25 MG/DL (ref 8–23)
CALCIUM SERPL-MCNC: 8.5 MG/DL (ref 8.3–10.4)
CALCULATED P AXIS, ECG09: 74 DEGREES
CALCULATED R AXIS, ECG10: 56 DEGREES
CALCULATED T AXIS, ECG11: 57 DEGREES
CHLORIDE SERPL-SCNC: 109 MMOL/L (ref 98–107)
CO2 SERPL-SCNC: 29 MMOL/L (ref 21–32)
CREAT SERPL-MCNC: 1.42 MG/DL (ref 0.8–1.5)
DIAGNOSIS, 93000: NORMAL
DIFFERENTIAL METHOD BLD: ABNORMAL
EOSINOPHIL # BLD: 0 K/UL (ref 0–0.8)
EOSINOPHIL NFR BLD: 0 % (ref 0.5–7.8)
ERYTHROCYTE [DISTWIDTH] IN BLOOD BY AUTOMATED COUNT: 13.2 % (ref 11.9–14.6)
GLUCOSE SERPL-MCNC: 129 MG/DL (ref 65–100)
HCT VFR BLD AUTO: 38.5 % (ref 41.1–50.3)
HGB BLD-MCNC: 12.9 G/DL (ref 13.6–17.2)
IMM GRANULOCYTES # BLD: 0 K/UL (ref 0–0.5)
IMM GRANULOCYTES NFR BLD AUTO: 0 % (ref 0–5)
LACTATE BLD-SCNC: 1.1 MMOL/L (ref 0.5–1.9)
LYMPHOCYTES # BLD: 1.2 K/UL (ref 0.5–4.6)
LYMPHOCYTES NFR BLD: 17 % (ref 13–44)
MCH RBC QN AUTO: 29.8 PG (ref 26.1–32.9)
MCHC RBC AUTO-ENTMCNC: 33.5 G/DL (ref 31.4–35)
MCV RBC AUTO: 88.9 FL (ref 79.6–97.8)
MONOCYTES # BLD: 0.6 K/UL (ref 0.1–1.3)
MONOCYTES NFR BLD: 9 % (ref 4–12)
NEUTS SEG # BLD: 5.5 K/UL (ref 1.7–8.2)
NEUTS SEG NFR BLD: 74 % (ref 43–78)
P-R INTERVAL, ECG05: 146 MS
PLATELET # BLD AUTO: 255 K/UL (ref 150–450)
PMV BLD AUTO: 9.3 FL (ref 10.8–14.1)
POTASSIUM SERPL-SCNC: 4.4 MMOL/L (ref 3.5–5.1)
Q-T INTERVAL, ECG07: 332 MS
QRS DURATION, ECG06: 80 MS
QTC CALCULATION (BEZET), ECG08: 403 MS
RBC # BLD AUTO: 4.33 M/UL (ref 4.23–5.67)
SODIUM SERPL-SCNC: 144 MMOL/L (ref 136–145)
TROPONIN I BLD-MCNC: 0.03 NG/ML (ref 0.02–0.05)
VENTRICULAR RATE, ECG03: 89 BPM
WBC # BLD AUTO: 7.4 K/UL (ref 4.3–11.1)

## 2018-04-21 PROCEDURE — 83605 ASSAY OF LACTIC ACID: CPT

## 2018-04-21 PROCEDURE — 93005 ELECTROCARDIOGRAM TRACING: CPT

## 2018-04-21 PROCEDURE — 80048 BASIC METABOLIC PNL TOTAL CA: CPT

## 2018-04-21 PROCEDURE — 74150 CT ABDOMEN W/O CONTRAST: CPT

## 2018-04-21 PROCEDURE — 84484 ASSAY OF TROPONIN QUANT: CPT

## 2018-04-21 PROCEDURE — 85025 COMPLETE CBC W/AUTO DIFF WBC: CPT

## 2018-04-21 PROCEDURE — 74011250637 HC RX REV CODE- 250/637

## 2018-04-21 PROCEDURE — 74019 RADEX ABDOMEN 2 VIEWS: CPT

## 2018-04-21 PROCEDURE — 71046 X-RAY EXAM CHEST 2 VIEWS: CPT

## 2018-04-21 PROCEDURE — 74011636320 HC RX REV CODE- 636/320

## 2018-04-21 PROCEDURE — 96372 THER/PROPH/DIAG INJ SC/IM: CPT

## 2018-04-21 PROCEDURE — 74011250636 HC RX REV CODE- 250/636

## 2018-04-21 RX ORDER — DIPHENHYDRAMINE HYDROCHLORIDE 50 MG/ML
25 INJECTION, SOLUTION INTRAMUSCULAR; INTRAVENOUS
Status: COMPLETED | OUTPATIENT
Start: 2018-04-21 | End: 2018-04-21

## 2018-04-21 RX ORDER — LORAZEPAM 1 MG/1
1 TABLET ORAL
Status: COMPLETED | OUTPATIENT
Start: 2018-04-21 | End: 2018-04-21

## 2018-04-21 RX ORDER — TRAMADOL HYDROCHLORIDE 50 MG/1
50 TABLET ORAL
Qty: 12 TAB | Refills: 0 | Status: SHIPPED | OUTPATIENT
Start: 2018-04-21 | End: 2020-11-06

## 2018-04-21 RX ORDER — HALOPERIDOL 5 MG/ML
10 INJECTION INTRAMUSCULAR
Status: COMPLETED | OUTPATIENT
Start: 2018-04-21 | End: 2018-04-21

## 2018-04-21 RX ADMIN — DIPHENHYDRAMINE HYDROCHLORIDE 25 MG: 50 INJECTION, SOLUTION INTRAMUSCULAR; INTRAVENOUS at 00:12

## 2018-04-21 RX ADMIN — LORAZEPAM 1 MG: 1 TABLET ORAL at 00:30

## 2018-04-21 RX ADMIN — DIATRIZOATE MEGLUMINE AND DIATRIZOATE SODIUM 30 ML: 660; 100 LIQUID ORAL; RECTAL at 02:20

## 2018-04-21 RX ADMIN — HALOPERIDOL LACTATE 10 MG: 5 INJECTION, SOLUTION INTRAMUSCULAR at 00:30

## 2018-04-21 NOTE — ED NOTES
I have reviewed discharge instructions with the patient. The patient verbalized understanding. Patient left ED via Discharge Method: ambulatory to Home with family. Opportunity for questions and clarification provided. Patient given 1 scripts. To continue your aftercare when you leave the hospital, you may receive an automated call from our care team to check in on how you are doing. This is a free service and part of our promise to provide the best care and service to meet your aftercare needs.  If you have questions, or wish to unsubscribe from this service please call [phone]. Thank you for Choosing our [facility] Department.

## 2018-04-21 NOTE — DISCHARGE INSTRUCTIONS
Hernia: Care Instructions  Your Care Instructions    A hernia develops when tissue bulges through a weak spot in the wall of your belly. The groin area and the navel are common areas for a hernia. A hernia can also develop near the area of a surgery you had before. Pressure from lifting, straining, or coughing can tear the weak area, causing the hernia to bulge and be painful. If you cannot push a hernia back into place, the tissue may become trapped outside the belly wall. If the hernia gets twisted and loses its blood supply, it will swell and die. This is called a strangulated hernia. It usually causes a lot of pain. It needs treatment right away. Some hernias need to be repaired to prevent a strangulated hernia. If your hernia causes symptoms or is large, you may need surgery. Follow-up care is a key part of your treatment and safety. Be sure to make and go to all appointments, and call your doctor if you are having problems. It's also a good idea to know your test results and keep a list of the medicines you take. How can you care for yourself at home? · Take care when lifting heavy objects. · Stay at a healthy weight. · Do not smoke. Smoking can cause coughing, which can cause your hernia to bulge. If you need help quitting, talk to your doctor about stop-smoking programs and medicines. These can increase your chances of quitting for good. · Talk with your doctor before wearing a corset or truss for a hernia. These devices are not recommended for treating hernias and sometimes can do more harm than good. There may be certain situations when your doctor thinks a truss would work, but these are rare. When should you call for help? Call your doctor now or seek immediate medical care if:  ? · You have new or worse belly pain. ? · You are vomiting. ? · You cannot pass stools or gas. ? · You cannot push the hernia back into place with gentle pressure when you are lying down.    ? · The area over the hernia turns red or becomes tender. ? Watch closely for changes in your health, and be sure to contact your doctor if you have any problems. Where can you learn more? Go to http://rakesh-jenn.info/. Enter C129 in the search box to learn more about \"Hernia: Care Instructions. \"  Current as of: May 12, 2017  Content Version: 11.4  © 9174-3954 [employer]. Care instructions adapted under license by [employer] (which disclaims liability or warranty for this information). If you have questions about a medical condition or this instruction, always ask your healthcare professional. [place] [address] any warranty or liability for your use of this information. Musculoskeletal Chest Pain: Care Instructions  Your Care Instructions    Chest pain is not always a sign that something is wrong with your heart or that you have another serious problem. The doctor thinks your chest pain is caused by strained muscles or ligaments, inflamed chest cartilage, or another problem in your chest, rather than by your heart. You may need more tests to find the cause of your chest pain. Follow-up care is a key part of your treatment and safety. Be sure to make and go to all appointments, and call your doctor if you are having problems. It's also a good idea to know your test results and keep a list of the medicines you take. How can you care for yourself at home? · Take pain medicines exactly as directed. ¨ If the doctor gave you a prescription medicine for pain, take it as prescribed. ¨ If you are not taking a prescription pain medicine, ask your doctor if you can take an over-the-counter medicine. · Rest and protect the sore area. · Stop, change, or take a break from any activity that may be causing your pain or soreness. · Put ice or a cold pack on the sore area for 10 to 20 minutes at a time.  Try to do this every 1 to 2 hours for the next 3 days (when you are awake) or until the swelling goes down. Put a thin cloth between the ice and your skin. · After 2 or 3 days, apply a heating pad set on low or a warm cloth to the area that hurts. Some doctors suggest that you go back and forth between hot and cold. · Do not wrap or tape your ribs for support. This may cause you to take smaller breaths, which could increase your risk of lung problems. · Mentholated creams such as Bengay or Icy Hot may soothe sore muscles. Follow the instructions on the package. · Follow your doctor's instructions for exercising. · Gentle stretching and massage may help you get better faster. Stretch slowly to the point just before pain begins, and hold the stretch for at least 15 to 30 seconds. Do this 3 or 4 times a day. Stretch just after you have applied heat. · As your pain gets better, slowly return to your normal activities. Any increased pain may be a sign that you need to rest a while longer. When should you call for help? Call 911 anytime you think you may need emergency care. For example, call if:  ? · You have chest pain or pressure. This may occur with:  ¨ Sweating. ¨ Shortness of breath. ¨ Nausea or vomiting. ¨ Pain that spreads from the chest to the neck, jaw, or one or both shoulders or arms. ¨ Dizziness or lightheadedness. ¨ A fast or uneven pulse. After calling 911, chew 1 adult-strength aspirin. Wait for an ambulance. Do not try to drive yourself. ? · You have sudden chest pain and shortness of breath, or you cough up blood. ?Call your doctor now or seek immediate medical care if:  ? · You have any trouble breathing. ? · Your chest pain gets worse. ? · Your chest pain occurs consistently with exercise and is relieved by rest.   ? Watch closely for changes in your health, and be sure to contact your doctor if:  ? · Your chest pain does not get better after 1 week. Where can you learn more? Go to http://rakesh-jenn.info/.   Enter O505 in the search box to learn more about \"Musculoskeletal Chest Pain: Care Instructions. \"  Current as of: March 20, 2017  Content Version: 11.4  © 4004-5241 [employer]. Care instructions adapted under license by [employer] (which disclaims liability or warranty for this information). If you have questions about a medical condition or this instruction, always ask your healthcare professional. [facility] any warranty or liability for your use of this information. Hernia: Care Instructions  Your Care Instructions    A hernia develops when tissue bulges through a weak spot in the wall of your belly. The groin area and the navel are common areas for a hernia. A hernia can also develop near the area of a surgery you had before. Pressure from lifting, straining, or coughing can tear the weak area, causing the hernia to bulge and be painful. If you cannot push a hernia back into place, the tissue may become trapped outside the belly wall. If the hernia gets twisted and loses its blood supply, it will swell and die. This is called a strangulated hernia. It usually causes a lot of pain. It needs treatment right away. Some hernias need to be repaired to prevent a strangulated hernia. If your hernia causes symptoms or is large, you may need surgery. Follow-up care is a key part of your treatment and safety. Be sure to make and go to all appointments, and call your doctor if you are having problems. It's also a good idea to know your test results and keep a list of the medicines you take. How can you care for yourself at home? · Take care when lifting heavy objects. · Stay at a healthy weight. · Do not smoke. Smoking can cause coughing, which can cause your hernia to bulge. If you need help quitting, talk to your doctor about stop-smoking programs and medicines. These can increase your chances of quitting for good.   · Talk with your doctor before wearing a corset or truss for a hernia. These devices are not recommended for treating hernias and sometimes can do more harm than good. There may be certain situations when your doctor thinks a truss would work, but these are rare. When should you call for help? Call your doctor now or seek immediate medical care if:  ? · You have new or worse belly pain. ? · You are vomiting. ? · You cannot pass stools or gas. ? · You cannot push the hernia back into place with gentle pressure when you are lying down. ? · The area over the hernia turns red or becomes tender. ? Watch closely for changes in your health, and be sure to contact your doctor if you have any problems. Where can you learn more? Go to http://rakesh-jenn.info/. Enter C129 in the search box to learn more about \"Hernia: Care Instructions. \"  Current as of: May 12, 2017  Content Version: 11.4  © 8452-4452 [employer]. Care instructions adapted under license by [employer] (which disclaims liability or warranty for this information). If you have questions about a medical condition or this instruction, always ask your healthcare professional. [address] any warranty or liability for your use of this information.

## 2018-04-21 NOTE — ED PROVIDER NOTES
HPI Comments: 67-year-old male complaining of substernal chest pain epigastric and abdominal pain. The patient was seen earlier today for same complaint had a complete workup which ruled out PE patient was also admitted and  A left coronary artery heart catheter was performed and no critical lesions were noted. Patient has a ventral hernia that is chronically  Bulging out and has a abdominal binder. Patient and wife are upset that he did not get adequate pain medicine before being discharged. Patient has a history of drug abuse. Patient is a 65 y.o. male presenting with chest pain. Chest Pain    This is a chronic problem. The current episode started more than 2 days ago. The problem has not changed since onset. The problem occurs constantly. The pain is associated with normal activity. The pain is present in the substernal region and epigastric region. The pain is at a severity of 10/10. The pain is severe. The quality of the pain is described as sharp. The pain does not radiate. Associated symptoms include abdominal pain. Pertinent negatives include no diaphoresis, no fever, no irregular heartbeat and no lower extremity edema. Procedural history includes cardiac catheterization and echocardiogram.       Past Medical History:   Diagnosis Date    Cardiomegaly     hernia     MI (myocardial infarction) ([facility] [address]     5-6yrs ago       Past Surgical History:   Procedure Laterality Date    ABDOMEN SURGERY PROC UNLISTED      hernia    HX OTHER SURGICAL           History reviewed. No pertinent family history. Social History     Social History    Marital status: SINGLE     Spouse name: N/A    Number of children: N/A    Years of education: N/A     Occupational History    Not on file.      Social History Main Topics    Smoking status: Current Every Day Smoker     Packs/day: 0.50    Smokeless tobacco: Never Used    Alcohol use No    Drug use: Yes     Special: Cocaine      Comment: yesterday    Sexual activity: Not Currently     Other Topics Concern    Not on file     Social History Narrative         ALLERGIES: Review of patient's allergies indicates no known allergies. Review of Systems   Constitutional: Negative. Negative for activity change, diaphoresis and fever. HENT: Negative. Eyes: Negative. Respiratory: Negative. Cardiovascular: Positive for chest pain. Gastrointestinal: Positive for abdominal pain. Genitourinary: Negative. Musculoskeletal: Negative. Skin: Negative. Neurological: Negative. Psychiatric/Behavioral: Negative. All other systems reviewed and are negative. Vitals:    04/20/18 2351   BP: 131/87   Pulse: (!) 104   Resp: 16   Temp: 99 °F (37.2 °C)   SpO2: 100%   Weight: 65.7 kg (144 lb 13.5 oz)   Height: 5' 9\" (1.753 m)            Physical Exam   Constitutional: He is oriented to person, place, and time. He appears well-developed and well-nourished. No distress. HENT:   Head: Normocephalic and atraumatic. Right Ear: External ear normal.   Left Ear: External ear normal.   Nose: Nose normal.   Mouth/Throat: Oropharynx is clear and moist. No oropharyngeal exudate. Eyes: Conjunctivae and EOM are normal. Pupils are equal, round, and reactive to light. Right eye exhibits no discharge. Left eye exhibits no discharge. No scleral icterus. Neck: Normal range of motion. Neck supple. No JVD present. No tracheal deviation present. Cardiovascular: Normal rate, regular rhythm and intact distal pulses. Pulmonary/Chest: Effort normal and breath sounds normal. No stridor. No respiratory distress. He has no wheezes. He exhibits no tenderness. Abdominal: Soft. Bowel sounds are normal. He exhibits distension. He exhibits no mass. There is no tenderness. A hernia is present. Hernia confirmed positive in the ventral area. Musculoskeletal: Normal range of motion. He exhibits no edema or tenderness.    Neurological: He is alert and oriented to person, place, and time. No cranial nerve deficit. Skin: Skin is warm and dry. No rash noted. He is not diaphoretic. No erythema. No pallor. Psychiatric: He has a normal mood and affect. His behavior is normal. Thought content normal.   Nursing note and vitals reviewed. MDM  Number of Diagnoses or Management Options  Diagnosis management comments: Patient had a extensive workup  And his previous visit today and rule out PE had left heart catheterization blood work enzymes and chest x-ray all which were negative. The wife is frustrated because patient keeps complaining of pain. Patient most likely has a psychogenic component to his complaints and the previous history of drug abuse. Is unclear what  Further studies be necessary however need to follow-up with surgeon for potential repair of his hernia. He will need to see his primary care for chronic pain and possibly a psychiatrist for help dealing with his current condition.  EKG is unchanged examination is benign will attempt to get pain relief tonight and follow-up with his primary care physician on Monday       Amount and/or Complexity of Data Reviewed  Clinical lab tests: reviewed  Tests in the radiology section of CPT®: reviewed  Tests in the medicine section of CPT®: reviewed          ED Course       Procedures

## 2018-04-21 NOTE — ED NOTES
Spoke with pts emergency contact. Polly Kulkarniky unable to pick pt up until she gets off work at [employer]. Polly Hastings have phone number for brother Stefan Antunez [phone] and [phone]. Chandan Hernandez [phone]. Got in touch with [facility] who says that he will call someone to come  the pt.

## 2018-04-21 NOTE — PROCEDURES
[address] Jazmyn Taylor  MR#: [id number]  : 1955  ACCOUNT #: [de-identified]   DATE OF SERVICE: 2018    DATE OF PROCEDURE:  2018       DICTATING PHYSICIAN:  Mick Gallardo MD      INDICATIONS:  The patient is a 78-year-old male who presented to the [address] [place] [address] Emergency Room with complaints of substernal chest pain that radiated to his left arm. He had some nonspecific EKG changes. His pain was not relieved with nitroglycerin. He was referred to the heart catheterization lab for further evaluation. ESTIMATED BLOOD LOSS:  Less than 5 mL. SEDATION:  Patient was given 2 mg of Versed and 50 mcg of fentanyl and monitored for conscious sedation by Sadi Andrea beginning at [phone] and ending at 26. SPECIMENS:  None. COMPLICATIONS:  None. ASSISTANTS:  None. Preprocedure timeout was completed. Mallampati score of 2. ASA score of 3. DESCRIPTION OF PROCEDURE:  After informed consent, the patient was prepped and draped in the usual sterile fashion. The right wrist was infiltrated with lidocaine. The right radial artery was accessed via the modified Seldinger technique with a 6-Luxembourger sheath. A total of 85 mL of Visipaque contrast were used for the entire procedure. A Terumo band was used for hemostasis. CATHETERS:  JL3.5 6-Luxembourger, JR5 6-Luxembourger, angled pigtail catheter 6-Luxembourger, and a JR5 guide 6-Luxembourger. FINDINGS:  Left ventricle: The left ventricular ejection fraction was 60%-65%. The LVEDP was measured at 19 mmHg. Left main was widely patent. Left anterior descending artery widely patent. Left circumflex artery widely patent. Right coronary artery had a mid lesion of approximately 60% stenosis and IFR measurement was done across this lesion and was found to be 1.0, which indicates that it was not hemodynamically significant.       CONCLUSION:  This is a 78-year-old male who presented to [facility]. ED [facility] ED with noncardiac chest pain.       MD OSCAR Mitchell /   D: 04/20/2018 14:44     T: 04/20/2018 21:06  JOB #: [id number]

## 2018-04-21 NOTE — ED TRIAGE NOTES
Pt states that he was discharged from upstairs around 1830 and that he is still having chest pain and that he is just unable to sleep

## 2018-10-16 ENCOUNTER — HOSPITAL ENCOUNTER (EMERGENCY)
Age: 63
Discharge: HOME | End: 2018-10-16
Attending: EMERGENCY MEDICINE
Payer: SELF-PAY

## 2018-10-16 VITALS
HEIGHT: 70 IN | RESPIRATION RATE: 18 BRPM | OXYGEN SATURATION: 98 % | HEART RATE: 85 BPM | SYSTOLIC BLOOD PRESSURE: 159 MMHG | DIASTOLIC BLOOD PRESSURE: 82 MMHG | BODY MASS INDEX: 22.9 KG/M2 | TEMPERATURE: 100.2 F | WEIGHT: 160 LBS

## 2018-10-16 DIAGNOSIS — K04.7 DENTAL ABSCESS: Primary | ICD-10-CM

## 2018-10-16 LAB
ALBUMIN SERPL-MCNC: 3.5 G/DL (ref 3.2–4.6)
ALBUMIN/GLOB SERPL: 0.9 {RATIO} (ref 1.2–3.5)
ALP SERPL-CCNC: 73 U/L (ref 50–136)
ALT SERPL-CCNC: 20 U/L (ref 12–65)
ANION GAP SERPL CALC-SCNC: 7 MMOL/L (ref 7–16)
AST SERPL-CCNC: 10 U/L (ref 15–37)
BASOPHILS # BLD: 0 K/UL (ref 0–0.2)
BASOPHILS NFR BLD: 1 % (ref 0–2)
BILIRUB SERPL-MCNC: 0.6 MG/DL (ref 0.2–1.1)
BUN SERPL-MCNC: 14 MG/DL (ref 8–23)
CALCIUM SERPL-MCNC: 8.7 MG/DL (ref 8.3–10.4)
CHLORIDE SERPL-SCNC: 105 MMOL/L (ref 98–107)
CO2 SERPL-SCNC: 28 MMOL/L (ref 21–32)
CREAT SERPL-MCNC: 1.32 MG/DL (ref 0.8–1.5)
DIFFERENTIAL METHOD BLD: ABNORMAL
EOSINOPHIL # BLD: 0 K/UL (ref 0–0.8)
EOSINOPHIL NFR BLD: 0 % (ref 0.5–7.8)
ERYTHROCYTE [DISTWIDTH] IN BLOOD BY AUTOMATED COUNT: 12.9 %
GLOBULIN SER CALC-MCNC: 3.8 G/DL (ref 2.3–3.5)
GLUCOSE SERPL-MCNC: 135 MG/DL (ref 65–100)
HCT VFR BLD AUTO: 38 % (ref 41.1–50.3)
HGB BLD-MCNC: 12.6 G/DL (ref 13.6–17.2)
IMM GRANULOCYTES # BLD: 0 K/UL (ref 0–0.5)
IMM GRANULOCYTES NFR BLD AUTO: 1 % (ref 0–5)
LACTATE BLD-SCNC: 1.5 MMOL/L (ref 0.5–1.9)
LYMPHOCYTES # BLD: 1.1 K/UL (ref 0.5–4.6)
LYMPHOCYTES NFR BLD: 13 % (ref 13–44)
MCH RBC QN AUTO: 29.8 PG (ref 26.1–32.9)
MCHC RBC AUTO-ENTMCNC: 33.2 G/DL (ref 31.4–35)
MCV RBC AUTO: 89.8 FL (ref 79.6–97.8)
MONOCYTES # BLD: 0.8 K/UL (ref 0.1–1.3)
MONOCYTES NFR BLD: 9 % (ref 4–12)
NEUTS SEG # BLD: 6.7 K/UL (ref 1.7–8.2)
NEUTS SEG NFR BLD: 77 % (ref 43–78)
NRBC # BLD: 0 K/UL (ref 0–0.2)
PLATELET # BLD AUTO: 251 K/UL (ref 150–450)
PMV BLD AUTO: 9.5 FL (ref 9.4–12.3)
POTASSIUM SERPL-SCNC: 3.5 MMOL/L (ref 3.5–5.1)
PROT SERPL-MCNC: 7.3 G/DL (ref 6.3–8.2)
RBC # BLD AUTO: 4.23 M/UL (ref 4.23–5.6)
SODIUM SERPL-SCNC: 140 MMOL/L (ref 136–145)
WBC # BLD AUTO: 8.7 K/UL (ref 4.3–11.1)

## 2018-10-16 PROCEDURE — 74011000250 HC RX REV CODE- 250: Performed by: EMERGENCY MEDICINE

## 2018-10-16 PROCEDURE — 83605 ASSAY OF LACTIC ACID: CPT

## 2018-10-16 PROCEDURE — 85025 COMPLETE CBC W/AUTO DIFF WBC: CPT

## 2018-10-16 PROCEDURE — 74011000258 HC RX REV CODE- 258: Performed by: EMERGENCY MEDICINE

## 2018-10-16 PROCEDURE — 99283 EMERGENCY DEPT VISIT LOW MDM: CPT | Performed by: EMERGENCY MEDICINE

## 2018-10-16 PROCEDURE — 96365 THER/PROPH/DIAG IV INF INIT: CPT | Performed by: EMERGENCY MEDICINE

## 2018-10-16 PROCEDURE — 80053 COMPREHEN METABOLIC PANEL: CPT

## 2018-10-16 RX ORDER — HYDROCODONE BITARTRATE AND ACETAMINOPHEN 5; 325 MG/1; MG/1
1-2 TABLET ORAL
Qty: 20 TAB | Refills: 0 | Status: SHIPPED | OUTPATIENT
Start: 2018-10-16

## 2018-10-16 RX ORDER — IBUPROFEN 800 MG/1
800 TABLET ORAL
Qty: 20 TAB | Refills: 0 | Status: SHIPPED | OUTPATIENT
Start: 2018-10-16 | End: 2018-10-23

## 2018-10-16 RX ORDER — CLINDAMYCIN HYDROCHLORIDE 300 MG/1
300 CAPSULE ORAL 4 TIMES DAILY
Qty: 28 CAP | Refills: 0 | Status: SHIPPED | OUTPATIENT
Start: 2018-10-16 | End: 2018-10-23

## 2018-10-16 RX ADMIN — SODIUM CHLORIDE 900 MG: 900 INJECTION, SOLUTION INTRAVENOUS at 19:17

## 2018-10-16 NOTE — ED TRIAGE NOTES
Pt states having an abscess tooth on the left side that came on last night. Denies any injury to his face or mouth.

## 2018-10-17 NOTE — ED NOTES
I have reviewed discharge instructions with the patient. The patient verbalized understanding. Patient left ED via Discharge Method: ambulatory to Home with self Opportunity for questions and clarification provided. Patient given 3 scripts. To continue your aftercare when you leave the hospital, you may receive an automated call from our care team to check in on how you are doing. This is a free service and part of our promise to provide the best care and service to meet your aftercare needs.  If you have questions, or wish to unsubscribe from this service please call [phone]. Thank you for Choosing our [facility] Emergency Department.

## 2018-10-22 NOTE — ED PROVIDER NOTES
The history is provided by the patient. Dental Pain This is a new problem. The current episode started yesterday. The problem occurs constantly. The problem has been rapidly worsening. The pain is located in the left upper mouth. The pain is moderate. Associated symptoms include a fever and swelling. There was no vomiting, no nausea, no shortness of breath and no headaches. He has tried nothing for the symptoms. Past Medical History:  
Diagnosis Date  Cardiomegaly  hernia  MI (myocardial infarction) ([facility] [address] 5-6yrs ago Past Surgical History:  
Procedure Laterality Date  ABDOMEN SURGERY PROC UNLISTED    
 hernia  HX OTHER SURGICAL No family history on file. Social History Socioeconomic History  Marital status: SINGLE Spouse name: Not on file  Number of children: Not on file  Years of education: Not on file  Highest education level: Not on file Social Needs  Financial resource strain: Not on file  Food insecurity - worry: Not on file  Food insecurity - inability: Not on file  Transportation needs - medical: Not on file  Transportation needs - non-medical: Not on file Occupational History  Not on file Tobacco Use  Smoking status: Current Every Day Smoker Packs/day: 0.50  Smokeless tobacco: Never Used Substance and Sexual Activity  Alcohol use: No  
 Drug use: Yes Types: Cocaine Comment: yesterday  Sexual activity: Not Currently Other Topics Concern  Not on file Social History Narrative  Not on file ALLERGIES: Patient has no known allergies. Review of Systems Constitutional: Positive for fever. Negative for chills. HENT: Positive for dental problem and facial swelling. Negative for sinus pressure and sinus pain. Eyes: Negative for pain and redness. Respiratory: Negative for shortness of breath and stridor. Gastrointestinal: Negative for nausea and vomiting. Musculoskeletal: Negative for neck pain and neck stiffness. Skin: Negative for pallor and wound. Neurological: Negative for dizziness and headaches. Vitals:  
 10/16/18 1713 10/16/18 2001 BP: 167/90 159/82 Pulse: 73 85 Resp: 26 18 Temp: (!) 100.5 °F (38.1 °C) 100.2 °F (37.9 °C) SpO2: 98% 98% Weight: 72.6 kg (160 lb) Height: 5' 9.5\" (1.765 m) Physical Exam  
Constitutional: He is oriented to person, place, and time. He appears well-developed and well-nourished. He appears distressed. HENT:  
Head: Normocephalic and atraumatic. No trismus, Left mid facial swelling, Generally poor dentition Handling secretions without difficulty Eyes: Conjunctivae are normal. Right eye exhibits no discharge. Left eye exhibits no discharge. Neck: Normal range of motion. Neck supple. No tracheal deviation present. Cardiovascular: Normal rate, regular rhythm and normal heart sounds. Pulmonary/Chest: Effort normal and breath sounds normal. No stridor. No respiratory distress. Musculoskeletal: He exhibits no deformity. Neurological: He is alert and oriented to person, place, and time. No cranial nerve deficit. Skin: Skin is warm. He is diaphoretic. There is erythema. Psychiatric: He has a normal mood and affect. His behavior is normal.  
  
 
MDM Number of Diagnoses or Management Options Dental abscess:  
Diagnosis management comments: Medical decision making note: 
Dental abscess, upper left, 
Facial abscess and slight fever Clindamycin and pain meds This concludes the \"medical decision making note\" part of this emergency department visit note. Procedures

## 2019-06-26 NOTE — ED PROVIDER NOTES
HPI Comments: 36-year-old male presents with  Severe sharp left-sided chest pain radiating to his back and left arm since early this morning. Pain is worse with movement and breathing causing shortness of breath. Denies similar pain in the past. No leg pain or swelling. Denies cough or fever. Has history of MI, but no stents. He has large abdominal hernia with failed prior surgery. Patient is a 65 y.o. male presenting with chest pain. The history is provided by the patient. Chest Pain (Angina)    Associated symptoms include back pain and shortness of breath. Pertinent negatives include no abdominal pain, no cough, no fever, no headaches, no nausea, no palpitations, no vomiting and no weakness. Past Medical History:   Diagnosis Date    Cardiomegaly     hernia     MI (myocardial infarction) ([facility] [address]     5-6yrs ago       Past Surgical History:   Procedure Laterality Date    ABDOMEN SURGERY PROC UNLISTED      hernia    HX OTHER SURGICAL           History reviewed. No pertinent family history. Social History     Social History    Marital status: SINGLE     Spouse name: N/A    Number of children: N/A    Years of education: N/A     Occupational History    Not on file. Social History Main Topics    Smoking status: Current Every Day Smoker     Packs/day: 0.50    Smokeless tobacco: Not on file    Alcohol use No    Drug use: Yes     Special: Cocaine      Comment: yesterday    Sexual activity: Not Currently     Other Topics Concern    Not on file     Social History Narrative         ALLERGIES: Review of patient's allergies indicates no known allergies. Review of Systems   Constitutional: Negative for chills and fever. HENT: Negative for hearing loss. Eyes: Negative for visual disturbance. Respiratory: Positive for shortness of breath. Negative for cough. Cardiovascular: Positive for chest pain. Negative for palpitations.    Gastrointestinal: Negative for abdominal pain, diarrhea, nausea and vomiting. Musculoskeletal: Positive for back pain. Skin: Negative for rash. Neurological: Negative for weakness and headaches. Psychiatric/Behavioral: Negative for confusion. Vitals:    04/20/18 0935   BP: 131/85   Pulse: (!) 56   Resp: 20   Temp: 98.6 °F (37 °C)   SpO2: 95%   Weight: 65.8 kg (145 lb)   Height: 5' 9\" (1.753 m)            Physical Exam   Constitutional: He appears well-developed and well-nourished. Appears in pain   HENT:   Head: Normocephalic and atraumatic. Right Ear: External ear normal.   Left Ear: External ear normal.   Nose: Nose normal.   Mouth/Throat: Oropharynx is clear and moist.   Eyes: Conjunctivae are normal. Pupils are equal, round, and reactive to light. Neck: Normal range of motion. Neck supple. Cardiovascular: Regular rhythm, normal heart sounds and intact distal pulses. Pulmonary/Chest: Effort normal and breath sounds normal. No respiratory distress. He has no wheezes. He exhibits no tenderness. Abdominal: Soft. Bowel sounds are normal. He exhibits no distension. There is no tenderness. A hernia is present. Hernia confirmed positive in the ventral area. Musculoskeletal: Normal range of motion. He exhibits no edema. Neurological: He is alert. Skin: Skin is warm and dry. Psychiatric: Judgment normal.   Nursing note and vitals reviewed. MDM  Number of Diagnoses or Management Options  Diagnosis management comments: Parts of this document were created using dragon voice recognition software. The chart has been reviewed but errors may still be present. 10:44 AM  Shortly after receiving GI cocktail, patient developed increasing shortness of breath. Sats were 98%, lungs clear. No tachypnea. The patient then had near syncopal episode with bradycardia into the 40s and became hypotensive with blood pressure in the 70s. No change in EKG from that done earlier today except decreased rate.    EKG does show new T-wave inversion/flattening In V5 V6 and lead 3 compared to last EKG done May 2017. VS improved with fluids and lying patient flat. CT chest ordered to evaluate for pulmonary embolism. 12:40 PM  CT chest negative. Second troponin normal.  Still having chest pain. Nitroglycerin avoided due to episode of hypotension, likely vagal episode. Discussed with cardiology, Dr Raheem Parikh, for admission regarding EKG changes and persistent chest pain.        Amount and/or Complexity of Data Reviewed  Clinical lab tests: ordered and reviewed (Results for orders placed or performed during the hospital encounter of 74/32/70  -METABOLIC PANEL, COMPREHENSIVE       Result                                            Value                         Ref Range                       Sodium                                            142                           136 - 145 mmol/L                Potassium                                         3.8                           3.5 - 5.1 mmol/L                Chloride                                          108 (H)                       98 - 107 mmol/L                 CO2                                               28                            21 - 32 mmol/L                  Anion gap                                         6 (L)                         7 - 16 mmol/L                   Glucose                                           114 (H)                       65 - 100 mg/dL                  BUN                                               25 (H)                        8 - 23 MG/DL                    Creatinine                                        1.29                          0.8 - 1.5 MG/DL                 GFR est AA                                        >60                           >60 ml/min/1.73m2               GFR est non-AA                                    >60                           >60 ml/min/1.73m2               Calcium                                           8.3 8.3 - 10.4 MG/DL                Bilirubin, total                                  0.3                           0.2 - 1.1 MG/DL                 ALT (SGPT)                                        24                            12 - 65 U/L                     AST (SGOT)                                        16                            15 - 37 U/L                     Alk. phosphatase                                  83                            50 - 136 U/L                    Protein, total                                    6.4                           6.3 - 8.2 g/dL                  Albumin                                           3.4                           3.2 - 4.6 g/dL                  Globulin                                          3.0                           2.3 - 3.5 g/dL                  A-G Ratio                                         1.1 (L)                       1.2 - 3.5                  -CBC WITH AUTOMATED DIFF       Result                                            Value                         Ref Range                       WBC                                               5.7                           4.3 - 11.1 K/uL                 RBC                                               4.32                          4.23 - 5.67 M/uL                HGB                                               12.6 (L)                      13.6 - 17.2 g/dL                HCT                                               38.1 (L)                      41.1 - 50.3 %                   MCV                                               88.2                          79.6 - 97.8 FL                  MCH                                               29.2                          26.1 - 32.9 PG                  MCHC                                              33.1                          31.4 - 35.0 g/dL                RDW                                               13.4                          11.9 - 14.6 %                   PLATELET                                          251                           150 - 450 K/uL                  MPV                                               8.9 (L)                       10.8 - 14.1 FL                  DF                                                AUTOMATED                                                     NEUTROPHILS                                       62                            43 - 78 %                       LYMPHOCYTES                                       30                            13 - 44 %                       MONOCYTES                                         5                             4.0 - 12.0 %                    EOSINOPHILS                                       2                             0.5 - 7.8 %                     BASOPHILS                                         1                             0.0 - 2.0 %                     IMMATURE GRANULOCYTES                             0                             0.0 - 5.0 %                     ABS. NEUTROPHILS                                  3.5                           1.7 - 8.2 K/UL                  ABS. LYMPHOCYTES                                  1.7                           0.5 - 4.6 K/UL                  ABS. MONOCYTES                                    0.3                           0.1 - 1.3 K/UL                  ABS. EOSINOPHILS                                  0.1                           0.0 - 0.8 K/UL                  ABS. BASOPHILS                                    0.1                           0.0 - 0.2 K/UL                  ABS. IMM.  GRANS.                                  0.0                           0.0 - 0.5 K/UL             -D DIMER       Result                                            Value                         Ref Range                       D DIMER                                           0.41                          <0.56 ug/ml(FEU)           -TROPONIN I Result                                            Value                         Ref Range                       Troponin-I, Qt.                                   <0.02 (L)                     0.02 - 0.05 NG/ML          -TROPONIN I       Result                                            Value                         Ref Range                       Troponin-I, Qt.                                   <0.02 (L)                     0.02 - 0.05 NG/ML          -POC TROPONIN-I       Result                                            Value                         Ref Range                       Troponin-I (POC)                                  0 (L)                         0.02 - 0.05 ng/ml          -MRI/CT POC CREATININE       Result                                            Value                         Ref Range                       Creatinine (POC)                                  1.3                           0.8 - 1.5 mg/dL                 GFRAA, POC                                        >60                           >60 ml/min/1.73m2               GFRNA, POC                                        60 (L)                        >60 ml/min/1.73m2          -POC TROPONIN-I       Result                                            Value                         Ref Range                       Troponin-I (POC)                                  0.01 (L)                      0.02 - 0.05 ng/ml          -EKG, 12 LEAD, INITIAL       Result                                            Value                         Ref Range                       Ventricular Rate                                  52                            BPM                             Atrial Rate                                       52                            BPM                             P-R Interval                                      140                           ms                              QRS Duration                                      98 ms                              Q-T Interval                                      450                           ms                              QTC Calculation (Bezet)                           418                           ms                              Calculated P Axis                                 67                            degrees                         Calculated R Axis                                 59                            degrees                         Calculated T Axis                                 61                            degrees                         Diagnosis                                                                                                   !! AGE AND GENDER SPECIFIC ECG ANALYSIS !! Sinus bradycardia   Nonspecific ST abnormality   Abnormal ECG   When compared with ECG of 01-MAY-2017 00:27,   Vent.  rate has decreased BY  40 BPM   ST no longer elevated in Lateral leads   Confirmed by Trinidad Steele MD (), CADEN LUCERO ([id number]) on 4/20/2018 12:12:56 PM     -EKG, 12 LEAD, SUBSEQUENT       Result                                            Value                         Ref Range                       Ventricular Rate                                  48                            BPM                             Atrial Rate                                       48                            BPM                             P-R Interval                                      164                           ms                              QRS Duration                                      102                           ms                              Q-T Interval                                      494                           ms                              QTC Calculation (Bezet)                           441                           ms                              Calculated P Axis                                 70                            degrees Calculated R Axis                                 62                            degrees                         Calculated T Axis                                 46                            degrees                         Diagnosis                                                                                                   !! AGE AND GENDER SPECIFIC ECG ANALYSIS !! Marked sinus bradycardia with Premature supraventricular complexes   Nonspecific ST and T wave abnormality   Abnormal ECG   When compared with ECG of 20-APR-2018 09:38,   Premature supraventricular complexes are now Present   Confirmed by Shell Bird MD (), CADEN LUCERO ([id number]) on 4/20/2018 12:13:21 PM     )  Tests in the radiology section of CPT®: ordered and reviewed (Ct Chest W Cont    Result Date: 4/20/2018  CT OF THE CHEST WITH INTRAVENOUS CONTRAST. INDICATION: Left-sided chest pain. COMPARISON: 30 April 2017. TECHNIQUE:   2.5 mm axial scans from above the aortic arch to the lung bases with 100 cc nonionic intravenous contrast without acute complication. Intravenous contrast was given to evaluate for pulmonary embolism. FINDINGS:  The degree of opacification of the pulmonary arteries is adequate. No intraluminal filling defects within the pulmonary arterial tree to suggest pulmonary embolism. Aorta is normal caliber with a uniform lumen without evidence of dissection. Lungs are clear considering the shallow degree of inspiration. Included portion of the upper abdomen is unremarkable. Adrenal glands are normal size.      IMPRESSION:  Negative for pulmonary embolism.       )  Tests in the medicine section of CPT®: ordered and reviewed          ED Course       Procedures English

## 2020-11-06 ENCOUNTER — HOSPITAL ENCOUNTER (EMERGENCY)
Age: 65
Discharge: HOME | End: 2020-11-06
Attending: EMERGENCY MEDICINE

## 2020-11-06 ENCOUNTER — APPOINTMENT (OUTPATIENT)
Dept: GENERAL RADIOLOGY | Age: 65
End: 2020-11-06
Attending: EMERGENCY MEDICINE

## 2020-11-06 VITALS
RESPIRATION RATE: 16 BRPM | OXYGEN SATURATION: 98 % | DIASTOLIC BLOOD PRESSURE: 73 MMHG | SYSTOLIC BLOOD PRESSURE: 153 MMHG | HEART RATE: 65 BPM | TEMPERATURE: 98.4 F

## 2020-11-06 DIAGNOSIS — S40.011A CONTUSION OF RIGHT SHOULDER, INITIAL ENCOUNTER: Primary | ICD-10-CM

## 2020-11-06 DIAGNOSIS — R07.89 OTHER CHEST PAIN: ICD-10-CM

## 2020-11-06 PROCEDURE — 73030 X-RAY EXAM OF SHOULDER: CPT

## 2020-11-06 PROCEDURE — 99283 EMERGENCY DEPT VISIT LOW MDM: CPT

## 2020-11-06 PROCEDURE — 74011250637 HC RX REV CODE- 250/637: Performed by: PHYSICIAN ASSISTANT

## 2020-11-06 RX ORDER — TRAMADOL HYDROCHLORIDE 50 MG/1
50 TABLET ORAL
Status: COMPLETED | OUTPATIENT
Start: 2020-11-06 | End: 2020-11-06

## 2020-11-06 RX ORDER — TRAMADOL HYDROCHLORIDE 50 MG/1
50 TABLET ORAL
Qty: 12 TAB | Refills: 0 | Status: SHIPPED | OUTPATIENT
Start: 2020-11-06 | End: 2020-11-09

## 2020-11-06 RX ADMIN — TRAMADOL HYDROCHLORIDE 50 MG: 50 TABLET, FILM COATED ORAL at 11:51

## 2020-11-06 NOTE — ED NOTES
I have reviewed discharge instructions with the patient. The patient verbalized understanding. Patient left ED via Discharge Method: ambulatory to Home with self. Opportunity for questions and clarification provided. Patient given 1 scripts. To continue your aftercare when you leave the hospital, you may receive an automated call from our care team to check in on how you are doing. This is a free service and part of our promise to provide the best care and service to meet your aftercare needs.  If you have questions, or wish to unsubscribe from this service please call [phone]. Thank you for Choosing our [facility] Emergency Department.

## 2020-11-06 NOTE — ED TRIAGE NOTES
Patient ambulatory to triage without difficulty; mask in place. Patient reports cutting a pecan tree and part of it broke, hitting him in the right shoulder. Patient denies pain in neck or down spine. Denies pain in arm.

## 2020-11-06 NOTE — LETTER
[address] [facility] EMERGENCY DEPT 
[address] [facility] [address] 
[phone] Work/School Note Date: 11/6/2020 To Whom It May concern: 
 
Armaan Sandoval was seen and treated today in the emergency room by the following provider(s): 
Attending Provider: Juan F Goel MD 
Physician Assistant: MEGHAN Malone. Armaan Sandoval may return to work on 11/09/20. Sincerely, MEGHAN Benitez

## 2020-11-06 NOTE — DISCHARGE INSTRUCTIONS
Patient Education   Patient Education   Patient Education        Learning About RICE (Rest, Ice, Compression, and Elevation)  What is RICE? RICE is a way to care for an injury. RICE helps relieve pain and swelling. It may also help with healing and flexibility. RICE stands for:  · R est and protect the injured or sore area. · I ce or a cold pack used as soon as possible. · C ompression, or wrapping the injured or sore area with an elastic bandage. · E levation (propping up) the injured or sore area. How do you do RICE? You can use RICE for home treatment when you have general aches and pains or after an injury or surgery. Rest  · Do not put weight on the injury for at least 24 to 48 hours. · Use crutches for a badly sprained knee or ankle. · Support a sprained wrist, elbow, or shoulder with a sling. Ice  · Put ice or a cold pack on the injury right away to reduce pain and swelling. Frozen vegetables will also work as an ice pack. Put a thin cloth between the ice or cold pack and your skin. The cloth protects the injured area from getting too cold. · Use ice for 10 to 15 minutes at a time for the first 48 to 72 hours. Compression  · Use compression for sprains, strains, and surgeries of the arms and legs. · Wrap the injured area with an elastic bandage or compression sleeve to reduce swelling. · Don't wrap it too tightly. If the area below it feels numb, tingles, or feels cool, loosen the wrap. Elevation  · Use elevation for areas of the body that can be propped up, such as arms and legs. · Prop up the injured area on pillows whenever you use ice. Keep it propped up anytime you sit or lie down. · Try to keep the injured area at or above the level of your heart. This will help reduce swelling and bruising. Where can you learn more?   Go to http://www.gray.com/  Enter I463 in the search box to learn more about \"Learning About RICE (Rest, Ice, Compression, and Elevation). \"  Current as of: March 2, 2020               Content Version: 12.6  © 2114-6428 [employer]. Care instructions adapted under license by [employer] (which disclaims liability or warranty for this information). If you have questions about a medical condition or this instruction, always ask your healthcare professional. [address] any warranty or liability for your use of this information. Shoulder Arthritis: Exercises  Introduction  Here are some examples of exercises for you to try. The exercises may be suggested for a condition or for rehabilitation. Start each exercise slowly. Ease off the exercises if you start to have pain. You will be told when to start these exercises and which ones will work best for you. How to do the exercises  Shoulder flexion (lying down)   To make a wand for this exercise, use a piece of PVC pipe or a broom handle with the broom removed. Make the wand about a foot wider than your shoulders. 1. Lie on your back, holding a wand with both hands. Your palms should face down as you hold the wand. 2. Keeping your elbows straight, slowly raise your arms over your head. Raise them until you feel a stretch in your shoulders, upper back, and chest.  3. Hold for 15 to 30 seconds. 4. Repeat 2 to 4 times. Shoulder rotation (lying down)   To make a wand for this exercise, use a piece of PVC pipe or a broom handle with the broom removed. Make the wand about a foot wider than your shoulders. 1. Lie on your back. Hold a wand with both hands with your elbows bent and palms up. 2. Keep your elbows close to your body, and move the wand across your body toward the sore arm. 3. Hold for 8 to 12 seconds. 4. Repeat 2 to 4 times. Shoulder internal rotation with towel   1. Hold a towel above and behind your head with the arm that is not sore. 2. With your sore arm, reach behind your back and grasp the towel.   3. With the arm above your head, pull the towel upward. Do this until you feel a stretch on the front and outside of your sore shoulder. 4. Hold 15 to 30 seconds. 5. Repeat 2 to 4 times. Shoulder blade squeeze   1. Stand with your arms at your sides, and squeeze your shoulder blades together. Do not raise your shoulders up as you squeeze. 2. Hold 6 seconds. 3. Repeat 8 to 12 times. Resisted rows   For this exercise, you will need elastic exercise material, such as surgical tubing or Thera-Band. 1. Put the band around a solid object at about waist level. (A bedpost will work well.) Each hand should hold an end of the band. 2. With your elbows at your sides and bent to 90 degrees, pull the band back. Your shoulder blades should move toward each other. Return to the starting position. 3. Repeat 8 to 12 times. External rotator strengthening exercise   1. Start by tying a piece of elastic exercise material to a doorknob. You can use surgical tubing or Thera-Band. (You may also hold one end of the band in each hand.)  2. Stand or sit with your shoulder relaxed and your elbow bent 90 degrees. Your upper arm should rest comfortably against your side. Squeeze a rolled towel between your elbow and your body for comfort. This will help keep your arm at your side. 3. Hold one end of the elastic band with the hand of the painful arm. 4. Start with your forearm across your belly. Slowly rotate the forearm out away from your body. Keep your elbow and upper arm tucked against the towel roll or the side of your body until you begin to feel tightness in your shoulder. Slowly move your arm back to where you started. 5. Repeat 8 to 12 times. Internal rotator strengthening exercise   1. Start by tying a piece of elastic exercise material to a doorknob. You can use surgical tubing or Thera-Band. 2. Stand or sit with your shoulder relaxed and your elbow bent 90 degrees. Your upper arm should rest comfortably against your side. Squeeze a rolled towel between your elbow and your body for comfort. This will help keep your arm at your side. 3. Hold one end of the elastic band in the hand of the painful arm. 4. Slowly rotate your forearm toward your body until it touches your belly. Slowly move it back to where you started. 5. Keep your elbow and upper arm firmly tucked against the towel roll or at your side. 6. Repeat 8 to 12 times. Pendulum swing   If you have pain in your back, do not do this exercise. 1. Hold on to a table or the back of a chair with your good arm. Then bend forward a little and let your sore arm hang straight down. This exercise does not use the arm muscles. Rather, use your legs and your hips to create movement that makes your arm swing freely. 2. Use the movement from your hips and legs to guide the slightly swinging arm back and forth like a pendulum (or elephant trunk). Then guide it in circles that start small (about the size of a dinner plate). Make the circles a bit larger each day, as your pain allows. 3. Do this exercise for 5 minutes, 5 to 7 times each day. 4. As you have less pain, try bending over a little farther to do this exercise. This will increase the amount of movement at your shoulder. Follow-up care is a key part of your treatment and safety. Be sure to make and go to all appointments, and call your doctor if you are having problems. It's also a good idea to know your test results and keep a list of the medicines you take. Where can you learn more? Go to http://www.gray.com/  Enter H562 in the search box to learn more about \"Shoulder Arthritis: Exercises. \"  Current as of: March 2, 2020               Content Version: 12.6  © 4455-1720 [employer]. Care instructions adapted under license by [employer] (which disclaims liability or warranty for this information).  If you have questions about a medical condition or this instruction, always ask your healthcare professional. [place] [address] any warranty or liability for your use of this information. Shoulder Pain: Care Instructions  Your Care Instructions     You can hurt your shoulder by using it too much during an activity, such as fishing or baseball. It can also happen as part of the everyday wear and tear of getting older. Shoulder injuries can be slow to heal, but your shoulder should get better with time. Your doctor may recommend a sling to rest your shoulder. If you have injured your shoulder, you may need testing and treatment. Follow-up care is a key part of your treatment and safety. Be sure to make and go to all appointments, and call your doctor if you are having problems. It's also a good idea to know your test results and keep a list of the medicines you take. How can you care for yourself at home? · Take pain medicines exactly as directed. ? If the doctor gave you a prescription medicine for pain, take it as prescribed. ? If you are not taking a prescription pain medicine, ask your doctor if you can take an over-the-counter medicine. ? Do not take two or more pain medicines at the same time unless the doctor told you to. Many pain medicines contain acetaminophen, which is Tylenol. Too much acetaminophen (Tylenol) can be harmful. · If your doctor recommends that you wear a sling, use it as directed. Do not take it off before your doctor tells you to. · Put ice or a cold pack on the sore area for 10 to 20 minutes at a time. Put a thin cloth between the ice and your skin. · If there is no swelling, you can put moist heat, a heating pad, or a warm cloth on your shoulder. Some doctors suggest alternating between hot and cold. · Rest your shoulder for a few days. If your doctor recommends it, you can then begin gentle exercise of the shoulder, but do not lift anything heavy. When should you call for help?    Call 911 anytime you think you may need emergency care. For example, call if:    · You have chest pain or pressure. This may occur with:  ? Sweating. ? Shortness of breath. ? Nausea or vomiting. ? Pain that spreads from the chest to the neck, jaw, or one or both shoulders or arms. ? Dizziness or lightheadedness. ? A fast or uneven pulse. After calling 911, chew 1 adult-strength aspirin. Wait for an ambulance. Do not try to drive yourself.     · Your arm or hand is cool or pale or changes color. Call your doctor now or seek immediate medical care if:    · You have signs of infection, such as:  ? Increased pain, swelling, warmth, or redness in your shoulder. ? Red streaks leading from a place on your shoulder. ? Pus draining from an area of your shoulder. ? Swollen lymph nodes in your neck, armpits, or groin. ? A fever. Watch closely for changes in your health, and be sure to contact your doctor if:    · You cannot use your shoulder.     · Your shoulder does not get better as expected. Where can you learn more? Go to http://www.[employer].com/  Enter H996 in the search box to learn more about \"Shoulder Pain: Care Instructions. \"  Current as of: March 2, 2020               Content Version: 12.6  © 5304-5072 [employer]. Care instructions adapted under license by [employer] (which disclaims liability or warranty for this information). If you have questions about a medical condition or this instruction, always ask your healthcare professional. [place] [address] any warranty or liability for your use of this information.

## 2020-11-06 NOTE — ED PROVIDER NOTES
Patient was cutting down tree limbs 2 days ago when 1 fell down on his right shoulder. He is right-handed. He states it has been painful and he has had difficulty moving it since then. He knows he has some arthritis in that shoulder. He states he did walk here but does not live far away. He has not had any chest pain, shortness of breath, rib pain, dizziness, weakness, swelling/tingling or weakness to his arms or legs, trouble with urination or bowel movements or other new symptoms. The history is provided by the patient. Shoulder Pain    The incident occurred 2 days ago. The incident occurred at home. The injury mechanism was a direct blow. The right shoulder is affected. The pain is at a severity of 8/10. The pain is moderate. The pain has been constant since onset. The pain does not radiate. There is no history of shoulder injury. He has no other injuries. There is no history of shoulder surgery. Pertinent negatives include no numbness, no muscle weakness and no tingling. Past Medical History:   Diagnosis Date    Cardiomegaly     hernia     MI (myocardial infarction) ([facility] [address]     5-6yrs ago       Past Surgical History:   Procedure Laterality Date    ABDOMEN SURGERY PROC UNLISTED      hernia    HX OTHER SURGICAL           No family history on file.     Social History     Socioeconomic History    Marital status: SINGLE     Spouse name: Not on file    Number of children: Not on file    Years of education: Not on file    Highest education level: Not on file   Occupational History    Not on file   Social Needs    Financial resource strain: Not on file    Food insecurity     Worry: Not on file     Inability: Not on file    Transportation needs     Medical: Not on file     Non-medical: Not on file   Tobacco Use    Smoking status: Current Every Day Smoker     Packs/day: 0.50    Smokeless tobacco: Never Used   Substance and Sexual Activity    Alcohol use: No    Drug use: Yes     Types: Cocaine     Comment: yesterday    Sexual activity: Not Currently   Lifestyle    Physical activity     Days per week: Not on file     Minutes per session: Not on file    Stress: Not on file   Relationships    Social connections     Talks on phone: Not on file     Gets together: Not on file     Attends Orthodox service: Not on file     Active member of club or organization: Not on file     Attends meetings of clubs or organizations: Not on file     Relationship status: Not on file    Intimate partner violence     Fear of current or ex partner: Not on file     Emotionally abused: Not on file     Physically abused: Not on file     Forced sexual activity: Not on file   Other Topics Concern    Not on file   Social History Narrative    Not on file         ALLERGIES: Patient has no known allergies. Review of Systems   Constitutional: Negative. HENT: Negative. Eyes: Negative. Respiratory: Negative. Cardiovascular: Negative. Gastrointestinal: Negative. Genitourinary: Negative. Musculoskeletal: Negative. Right shoulder pain   Skin: Negative. Neurological: Negative. Negative for tingling and numbness. Psychiatric/Behavioral: Negative. All other systems reviewed and are negative. Vitals:    11/06/20 1019   BP: (!) 153/73   Pulse: 65   Resp: 16   Temp: 98.4 °F (36.9 °C)   SpO2: 98%            Physical Exam  Vitals signs and nursing note reviewed. Constitutional:       Appearance: He is well-developed. HENT:      Head: Normocephalic and atraumatic. Right Ear: External ear normal.      Left Ear: External ear normal.      Nose: Nose normal.   Eyes:      Conjunctiva/sclera: Conjunctivae normal.      Pupils: Pupils are equal, round, and reactive to light. Neck:      Musculoskeletal: Normal range of motion and neck supple. Cardiovascular:      Rate and Rhythm: Normal rate and regular rhythm. Heart sounds: Normal heart sounds.    Pulmonary:      Effort: Pulmonary effort is normal.      Breath sounds: Normal breath sounds. Abdominal:      General: Bowel sounds are normal.      Palpations: Abdomen is soft. Musculoskeletal: Normal range of motion. Arms:    Skin:     General: Skin is warm and dry. Neurological:      Mental Status: He is alert and oriented to person, place, and time. Deep Tendon Reflexes: Reflexes are normal and symmetric. Psychiatric:         Behavior: Behavior normal.         Thought Content: Thought content normal.         Judgment: Judgment normal.          MDM  Number of Diagnoses or Management Options  Contusion of right shoulder, initial encounter:      Amount and/or Complexity of Data Reviewed  Tests in the radiology section of CPT®: reviewed    Risk of Complications, Morbidity, and/or Mortality  Presenting problems: moderate  Diagnostic procedures: moderate  Management options: moderate    Patient Progress  Patient progress: improved         Procedures    The patient was observed in the ED. Results Reviewed:  XR SHOULDER RT AP/LAT MIN 2 V   Final Result   IMPRESSION:      1. No acute fracture or dislocation. 2. Degenerative changes are as detailed above and favor chronic rotator cuff   disease. Patient was placed in a sling for comfort. He should rest, ice, elevate, avoid painful activities. ED if worse. Follow up with Ortho for recheck. I made a referral to orthopedics today. Patient did want something for pain so I have sent him with some tramadol. He is stable for discharge and ambulatory out of the ER without difficulty at this time. I discussed the results of all labs, procedures, radiographs, and treatments with the patient and available family. Treatment plan is agreed upon and the patient is ready for discharge. All voiced understanding of the discharge plan and medication instructions or changes as appropriate. Questions about treatment in the ED were answered.   All were encouraged to return should symptoms worsen or new problems develop.

## 2022-03-19 PROBLEM — R55 NEAR SYNCOPE: Status: ACTIVE | Noted: 2018-04-20

## 2023-02-24 ENCOUNTER — OFFICE VISIT (OUTPATIENT)
Dept: INTERNAL MEDICINE CLINIC | Facility: CLINIC | Age: 68
End: 2023-02-24
Payer: MEDICAID

## 2023-02-24 VITALS
HEIGHT: 69 IN | SYSTOLIC BLOOD PRESSURE: 110 MMHG | HEART RATE: 76 BPM | DIASTOLIC BLOOD PRESSURE: 73 MMHG | TEMPERATURE: 98.4 F | BODY MASS INDEX: 21.15 KG/M2 | OXYGEN SATURATION: 97 % | WEIGHT: 142.8 LBS

## 2023-02-24 DIAGNOSIS — K13.0 LIP MASS: ICD-10-CM

## 2023-02-24 DIAGNOSIS — I25.2 HX OF MYOCARDIAL INFARCTION: ICD-10-CM

## 2023-02-24 DIAGNOSIS — S43.015D ANTERIOR DISLOCATION OF LEFT SHOULDER, SUBSEQUENT ENCOUNTER: ICD-10-CM

## 2023-02-24 DIAGNOSIS — K43.9 VENTRAL HERNIA WITHOUT OBSTRUCTION OR GANGRENE: ICD-10-CM

## 2023-02-24 DIAGNOSIS — Z23 ENCOUNTER FOR ADMINISTRATION OF VACCINE: ICD-10-CM

## 2023-02-24 DIAGNOSIS — Z59.00 HOMELESS: ICD-10-CM

## 2023-02-24 DIAGNOSIS — I25.2 HX OF MYOCARDIAL INFARCTION: Primary | ICD-10-CM

## 2023-02-24 DIAGNOSIS — Z11.59 NEED FOR HEPATITIS C SCREENING TEST: ICD-10-CM

## 2023-02-24 DIAGNOSIS — Z76.89 ESTABLISHING CARE WITH NEW DOCTOR, ENCOUNTER FOR: Primary | ICD-10-CM

## 2023-02-24 PROCEDURE — 1124F ACP DISCUSS-NO DSCNMKR DOCD: CPT | Performed by: INTERNAL MEDICINE

## 2023-02-24 PROCEDURE — 90677 PCV20 VACCINE IM: CPT | Performed by: INTERNAL MEDICINE

## 2023-02-24 PROCEDURE — 90471 IMMUNIZATION ADMIN: CPT | Performed by: INTERNAL MEDICINE

## 2023-02-24 PROCEDURE — 90472 IMMUNIZATION ADMIN EACH ADD: CPT | Performed by: INTERNAL MEDICINE

## 2023-02-24 PROCEDURE — 90715 TDAP VACCINE 7 YRS/> IM: CPT | Performed by: INTERNAL MEDICINE

## 2023-02-24 PROCEDURE — 99204 OFFICE O/P NEW MOD 45 MIN: CPT | Performed by: INTERNAL MEDICINE

## 2023-02-24 RX ORDER — ATORVASTATIN CALCIUM 40 MG/1
40 TABLET, FILM COATED ORAL DAILY
Qty: 30 TABLET | Refills: 3 | Status: SHIPPED | OUTPATIENT
Start: 2023-02-24

## 2023-02-24 RX ORDER — ASPIRIN 81 MG/1
81 TABLET ORAL DAILY
Qty: 90 TABLET | Refills: 1 | Status: SHIPPED | OUTPATIENT
Start: 2023-02-24

## 2023-02-24 SDOH — ECONOMIC STABILITY: FOOD INSECURITY: WITHIN THE PAST 12 MONTHS, THE FOOD YOU BOUGHT JUST DIDN'T LAST AND YOU DIDN'T HAVE MONEY TO GET MORE.: OFTEN TRUE

## 2023-02-24 SDOH — ECONOMIC STABILITY: HOUSING INSECURITY
IN THE LAST 12 MONTHS, WAS THERE A TIME WHEN YOU DID NOT HAVE A STEADY PLACE TO SLEEP OR SLEPT IN A SHELTER (INCLUDING NOW)?: YES

## 2023-02-24 SDOH — ECONOMIC STABILITY - HOUSING INSECURITY: HOMELESSNESS UNSPECIFIED: Z59.00

## 2023-02-24 SDOH — ECONOMIC STABILITY: INCOME INSECURITY: HOW HARD IS IT FOR YOU TO PAY FOR THE VERY BASICS LIKE FOOD, HOUSING, MEDICAL CARE, AND HEATING?: VERY HARD

## 2023-02-24 SDOH — ECONOMIC STABILITY: FOOD INSECURITY: WITHIN THE PAST 12 MONTHS, YOU WORRIED THAT YOUR FOOD WOULD RUN OUT BEFORE YOU GOT MONEY TO BUY MORE.: OFTEN TRUE

## 2023-02-24 ASSESSMENT — PATIENT HEALTH QUESTIONNAIRE - PHQ9
SUM OF ALL RESPONSES TO PHQ9 QUESTIONS 1 & 2: 0
SUM OF ALL RESPONSES TO PHQ QUESTIONS 1-9: 0
1. LITTLE INTEREST OR PLEASURE IN DOING THINGS: 0
SUM OF ALL RESPONSES TO PHQ QUESTIONS 1-9: 0
2. FEELING DOWN, DEPRESSED OR HOPELESS: 0

## 2023-02-24 NOTE — ASSESSMENT & PLAN NOTE
Patient with previous ventral hernia surgical repair with failed sutures currently resulting in intraabdominal contents extruding through abdominal wall and remaining below skin surface. Referring to General surgery.

## 2023-02-24 NOTE — ASSESSMENT & PLAN NOTE
Patient presenting today to establish care with a new PCP. Patient is a 79-year-old male with medical history significant for MI, and ventral hernia with previous repair. Patient also with concerns for shoulder dislocation. We will offer referrals to appropriate specialty services including cardiology, general surgery, and orthopedic surgery.   We will offer patient establishment labs at this time including CBC, CMP, lipid, vitamin D.

## 2023-02-24 NOTE — PATIENT INSTRUCTIONS
TRANSPORTATION RESOURCES    Medicaid Aneta Barillas: Rosalind Braswell. NEW NAME - [employer]   Phone:  [phone]  Must call for a ride at least 3 days before your appointment. Call Monday- Friday 8:00am to 5:00pm.    Transportation is available for MD appts, dialysis, x-rays, lab work, drug store, or other medical appointments. [place] on Aging  What they offer: Provides assistance and medical transport to adults 60 years and older. Phone: [phone]    Koubei.com   What they offer: Charge a fee for transport (not free)  Phone: [phone]    Transportation on Demand   What they offer: Charge a fee for transport (not free)  Phone: 21 [phone] they offer: Barb Mail is accessible via an online platform that connects patients with non-emergency medical transportation (NEMT.) Roundtrip is a comprehensive solution which supports all levels of transport: rideshare (Lyft/Uber), Taxis, wheelchair vans, stretcher vehicles, ALS/BLS, and all payors when and where they are needed. https://Uptake Medical/      Need additional resources? Call 211 or visit Find Help:  https://www. findhelp.org/FINANCIAL RESOURCES    [employer]   o What they offer: The [employer] Program helps uninsured patients who do not qualify for government-sponsored health insurance and cannot afford to pay for their medical care. Insured patients may also qualify for assistance based on family income, family size, and medical needs. o Phone Number: [phone]      o How to apply for the [employer] Program:   Option 1: To apply for financial assistance, a patient (or their family or other provider) should fill out the Financial Assistance Application. Copies of the Financial Assistance Application and the FAP may be obtained for free by calling the [facility] customer service department at [phone].    Option 2: The Financial Assistance Application and policy may be obtained for free by downloading a copy from the [employer]: o http://julien-niño.org/. com/patient-resources/financial-assistance       o Applications are available in several languages on the website     Array Storm  What they offer:  May be able to assist with medical bills if you are uninsured. Phone number: [phone]  www. SousaCamp    BlueLinx and Resources for the [place]'s (uninsured and under insured) A Nurse and  are available. Persian speaking staff available   What they offer: Provide information and assistance for the whole family  Discuss your health and help you find a doctor if you need one. Answer questions about your medications. Diabetes Self-Management education. Connect you with financial assistance agencies, social and medical services. Provide moral support during difficult times. Unfortunately they are unable to administer any medicine, provide you with money or transportation in our vehicles. However, the team will try to help you with your health needs. Phone: [phone] to make an appointment. Leave voice mail with name and call back number. Medication Cost Assistance    Good Rx    o What they offer: Good Rx tracks prescription drug prices and provides drug  coupons for discounts on medications. o Website: iDoneThis/     NeedyMeds   o What they offer: NeedyMeds offers free information on medications and healthcare cost savings programs including prescription assistance programs, coupons, and discount programs. o Website: PaymentBack.WorkForce Software. org/   o Helpline: 831.715.6876     RX Assist   o What they offer: Information about free and low-cost medicine programs.    o Website: https://Capital Alliance Software/     [employer] $4 Prescription Program   o What they offer: Prescription Program includes up to a 30-day supply for $4 and a 90-day supply for $10 of some covered generic drugs at commonly prescribed dosages   o Website: BetaBlues.pl     [place]y [place]  What they offer:  If you are uninsured and cannot afford the prescription medicine you need, you may be able to have your prescriptions filled at no cost through treadalong. You must live in [place]. To find out if you qualify. Website: MXP4.it    Cost Plus Drugs  What they offer:  Low-cost versions of high-cost generic drugs  Website: https://costOpenFindrugs. GradFly/    [facility]   https://[facility]/  Phone: [phone]      Need additional resources? Call 211 or Find Help: https://www. Essensium.org/FOOD RESOURCES    Meals on Wheels:  What they offer: Meals on Wheels is a program that delivers meals to individuals who have no reliable means for maintaining a healthy diet. Hair Phone: [phone]  www. AdTapsyJanae. [address]:  What they offer:  Anyone is eligible to order, but Jonathan Alcristhian is specifically designed for customers who could benefit from accessible, low-cost fresh food. Fresh Food Boxes are $15* with credit/debit card and are ALWAYS $5 with SNAP/EBT   Boxes are distributed every other week and you must preorder your box through their website  Drive-thru box pickup is every other Wednesday from 11 am-6 pm at: [address] [facility] ([facility], [address] [place] [address]  Website:  www.[facility]. org/fsg     Food Pantries:   Marsh & Shreya   Phone: [phone]  Located in [address].  Open Thursdays 8a-12p  Website: www.Beijing Joy [employer] Corporation: [phone]  Located at [address] 8am-12pm Fridays  Website: https://Allied Pacific Sports Network-ministries. org/   [address] Pantry  Phone: [phone]  Located at [address]  Call for Pantry hours and availability  Website: [place] Yuandong Group.ee  Water of [address] [place] Pantry  Phone: [phone]  Located at [phone]  Call for Pantry hours and availability    Website: Gerard.pl. php  [address]  Phone: [phone]  Located at [address]. Emergency Food Pantry Hours: Mon, Wed, and Fri 9am-1pm  Website: http://wwwStribe/  [address] Pantry  Phone: [phone]  Located at [address]. Call for hours and availability   Website: https://International Pet Grooming Academy/  [address] Pantry  Phone: [phone]  Located at [address]. Call for hours and availability; serves up to 76 families a week, based on donations  Website: https://Dsg.nr. GoRest Software/      Need additional resources? Call 211 or Find Help: https://www. findhelp.org/HOUSING [address] [place] [address]  What they offer: Housing Choice Vouchers (Section 8) rental assistance available to persons with disabilities, families with children, and older adults whose household income is below 80% the median income for [facility]. http://www.TruClinic/. net  Phone: [phone]    [address] they offer: Free 24/7 access to trained counselors for local resources and assistance   o Website: AppRedeem.co.uk. asp   o Phone Number: [phone] (text messaging accepted)       KelDoc. LabDoor:https://www. BrowseLabs/kokmcuscbm-aeooyx-rr/    [facility] Worldwide SemiSlidebean.co.za    [facility]. Deck Works.co.BitPay/pdfs/resources-[place]. pdf    [place] Financial Guide: TripleFare.com.cy. php     [facility] List: http://HALFPOPSSoane Energyections. org/need-help      Need additional resources?  Call 211 or visit Find Help:  https://www. findhelp.org/

## 2023-02-24 NOTE — ASSESSMENT & PLAN NOTE
1 year of left shoulder discomfort. Reduced in ED when in [place]. Referring to Ortho for consideration of treatment and repair. Unable to abduct left arm. Patient able to forward flex.

## 2023-02-24 NOTE — PROGRESS NOTES
Jami Walker (: 1955) is a 78 y.o. male, here for evaluation of the following chief complaint(s):  New Patient (Pt is here to estab PCP care. Pt states he's never had a PCP before.), Shoulder Injury (Pt states his L shoulder is popped out of socket. Pt states he dislocated it 2 years ago and it pops out of place periodically of turnt the wrong way.), Other (Pt has something like a skin tag on his lip that has been present x 2 years and is not painful.), and Referral - General (Pt needs a referral to general surgery due to complications from previous hernia surgery 6 or 7 years ago)       ASSESSMENT/PLAN:  1. Establishing care with new doctor, encounter for  Assessment & Plan:   Patient presenting today to establish care with a new PCP. Patient is a 70-year-old male with medical history significant for MI, and ventral hernia with previous repair. Patient also with concerns for shoulder dislocation. We will offer referrals to appropriate specialty services including cardiology, general surgery, and orthopedic surgery. We will offer patient establishment labs at this time including CBC, CMP, lipid, vitamin D.  2. Hx of myocardial infarction  3. Ventral hernia without obstruction or gangrene  Assessment & Plan:   Patient with previous ventral hernia surgical repair with failed sutures currently resulting in intraabdominal contents extruding through abdominal wall and remaining below skin surface. Referring to General surgery. 4. Anterior dislocation of left shoulder, subsequent encounter  Assessment & Plan:  1 year of left shoulder discomfort. Reduced in ED when in [facility] [place]. Referring to Ortho for consideration of treatment and repair. Unable to abduct left arm. Patient able to forward flex. 5. Need for hepatitis C screening test  6. Encounter for administration of vaccine  7. Lip mass  Assessment & Plan:  2-3 years of noticeable lip mass 0.5 cm circular and raised. He has been a 30 year smoker. Concern for mucocele vs malignancy. Referring to ENT urgently. SUBJECTIVE/OBJECTIVE:  HPI: Patient is a 76-year-old male with medical history significant for ventral hernia, tobacco use disorder, chest pain, near syncope that presents to establish care with a new PCP. Patient will be age eligible for all routine screening vaccination methods. Patient is eligible for annual Medicare wellness introductory visit. Most recently seen in the emergency department 2020. Patient with grossly abdnormal abdomen with protuberant mass 20 cm circular. Previous incisional scare midline and well healed. Patient with grosly deformed left shoulder and inability to abduct arm. He can forward flex arm. Symptoms present for the past year. Patient with grossly evident lower lip mass concerning for mucocele vs malignancy. Referring urgently to general surgery, ortho and ENT. Patient with previous MI. Well controlled BP in office. Continues to smoke. Not on lipid lowering therapy. Will offer statin therapy at this time and check lipid panel. He states was on his way to the hospital by foot 3 year ago when he experienced a CVA. He \"walked it off\". It was on the left side, he was unable to move arm and leg. He kept working at it until his symptoms improved. Social History     Tobacco Use    Smoking status: Every Day     Packs/day: 0.50     Years: 30.00     Pack years: 15.00     Types: Cigarettes    Smokeless tobacco: Never   Vaping Use    Vaping Use: Never used   Substance Use Topics    Alcohol use: No    Drug use: Yes     Types: Cocaine     Comment: occasionally     Vitals:    02/24/23 0906   BP: 110/73   Pulse: 76   Temp: 98.4 °F (36.9 °C)   SpO2: 97%   Weight: 142 lb 12.8 oz (64.8 kg)   Height: 5' 9\" (1.753 m)      Body mass index is 21.09 kg/m². Physical Exam  Vitals reviewed. Constitutional:       General: He is not in acute distress. Appearance: Normal appearance.    HENT:      Head: Normocephalic and atraumatic. Right Ear: External ear normal.      Left Ear: External ear normal.      Nose: Nose normal.      Mouth/Throat:      Mouth: Mucous membranes are moist.      Pharynx: Oropharynx is clear. Eyes:      Extraocular Movements: Extraocular movements intact. Conjunctiva/sclera: Conjunctivae normal.   Cardiovascular:      Rate and Rhythm: Normal rate and regular rhythm. Pulses: Normal pulses. Heart sounds: Normal heart sounds. Pulmonary:      Effort: Pulmonary effort is normal.      Breath sounds: Normal breath sounds. No wheezing. Abdominal:      General: Bowel sounds are normal. There is distension. Tenderness: There is no abdominal tenderness. Hernia: A hernia is present. Comments: Pronounced ventral hernia present   Musculoskeletal:         General: Deformity present. No swelling. Normal range of motion. Cervical back: Normal range of motion. Comments: Left shoulder anteriorly displaced. Skin:     General: Skin is warm and dry. Coloration: Skin is not jaundiced. Neurological:      General: No focal deficit present. Mental Status: He is alert and oriented to person, place, and time. Mental status is at baseline. Psychiatric:         Mood and Affect: Mood normal.         Behavior: Behavior normal.         Thought Content: Thought content normal.     An electronic signature was used to authenticate this note.   Sade Pereira DO

## 2023-02-24 NOTE — ACP (ADVANCE CARE PLANNING)
Advance Care Planning   Advance Care Planning (ACP)     Attempted to discuss ACP with Mr. Meryle Heman today, he declines to discuss at this time. Will readdress at future visit.

## 2023-02-24 NOTE — ASSESSMENT & PLAN NOTE
2-3 years of noticeable lip mass 0.5 cm circular and raised. He has been a 30 year smoker. Concern for mucocele vs malignancy. Referring to ENT urgently.

## 2023-03-02 ENCOUNTER — PREP FOR PROCEDURE (OUTPATIENT)
Dept: SURGERY | Age: 68
End: 2023-03-02

## 2023-03-02 ENCOUNTER — OFFICE VISIT (OUTPATIENT)
Dept: SURGERY | Age: 68
End: 2023-03-02
Payer: MEDICAID

## 2023-03-02 VITALS
HEIGHT: 69 IN | HEART RATE: 82 BPM | OXYGEN SATURATION: 98 % | SYSTOLIC BLOOD PRESSURE: 132 MMHG | WEIGHT: 142 LBS | BODY MASS INDEX: 21.03 KG/M2 | DIASTOLIC BLOOD PRESSURE: 80 MMHG

## 2023-03-02 DIAGNOSIS — K43.9 VENTRAL HERNIA WITHOUT OBSTRUCTION OR GANGRENE: Primary | ICD-10-CM

## 2023-03-02 PROCEDURE — 1123F ACP DISCUSS/DSCN MKR DOCD: CPT | Performed by: STUDENT IN AN ORGANIZED HEALTH CARE EDUCATION/TRAINING PROGRAM

## 2023-03-02 PROCEDURE — 99204 OFFICE O/P NEW MOD 45 MIN: CPT | Performed by: STUDENT IN AN ORGANIZED HEALTH CARE EDUCATION/TRAINING PROGRAM

## 2023-03-02 RX ORDER — SODIUM CHLORIDE 9 MG/ML
INJECTION, SOLUTION INTRAVENOUS PRN
Status: CANCELLED | OUTPATIENT
Start: 2023-03-02

## 2023-03-02 RX ORDER — SODIUM CHLORIDE 0.9 % (FLUSH) 0.9 %
5-40 SYRINGE (ML) INJECTION PRN
Status: CANCELLED | OUTPATIENT
Start: 2023-03-02

## 2023-03-02 RX ORDER — SODIUM CHLORIDE 0.9 % (FLUSH) 0.9 %
5-40 SYRINGE (ML) INJECTION EVERY 12 HOURS SCHEDULED
Status: CANCELLED | OUTPATIENT
Start: 2023-03-02

## 2023-03-02 NOTE — PROGRESS NOTES
General Surgery New Patient Office Visit:    Pt presents with concerns about a large ventral hernia. Pt had this previously repaired primarily but it recurred soon thereafter. Patient had a CT scan 5 years ago that showed a large ventral hernia. He has not had it repaired since    Medications:   Current Outpatient Medications   Medication Sig Dispense Refill    atorvastatin (LIPITOR) 40 MG tablet Take 1 tablet by mouth daily 30 tablet 3    aspirin EC 81 MG EC tablet Take 1 tablet by mouth daily 90 tablet 1     No current facility-administered medications for this visit.          Allergies: No Known Allergies     Past History:  Past Medical History:   Diagnosis Date    Cardiomegaly     MI (myocardial infarction) ([facility] [address]     5-6yrs ago    Other ill-defined conditions(799.89)       Past Surgical History:   Procedure Laterality Date    OTHER SURGICAL HISTORY      PA UNLISTED PROCEDURE ABDOMEN PERITONEUM & OMENTUM      hernia        Family and Social History:  Family History   Problem Relation Age of Onset    No Known Problems Mother     No Known Problems Father      Social History     Socioeconomic History    Marital status: Single     Spouse name: Not on file    Number of children: Not on file    Years of education: Not on file    Highest education level: Not on file   Occupational History    Not on file   Tobacco Use    Smoking status: Every Day     Packs/day: 0.50     Years: 30.00     Pack years: 15.00     Types: Cigarettes    Smokeless tobacco: Never   Vaping Use    Vaping Use: Never used   Substance and Sexual Activity    Alcohol use: No    Drug use: Yes     Types: Cocaine     Comment: occasionally    Sexual activity: Not Currently     Partners: Female   Other Topics Concern    Not on file   Social History Narrative    Not on file     Social Determinants of Health     Financial Resource Strain: High Risk    Difficulty of Paying Living Expenses: Very hard   Food Insecurity: Food Insecurity Present    Worried About Running Out of Food in the Last Year: Often true    Ran Out of Food in the Last Year: Often true   Transportation Needs: Unmet Transportation Needs    Lack of Transportation (Medical): Not on file    Lack of Transportation (Non-Medical): Yes   Physical Activity: Not on file   Stress: Not on file   Social Connections: Not on file   Intimate Partner Violence: Not on file   Housing Stability: High Risk    Unable to Pay for Housing in the Last Year: Not on file    Number of Places Lived in the Last Year: Not on file    Unstable Housing in the Last Year: Yes        REVIEW OF SYSTEMS: 10 Point ROS negative except what is in HPI    PHYSICAL EXAMINATION:    /80   Pulse 82   Ht 5' 9\" (1.753 m)   Wt 142 lb (64.4 kg)   SpO2 98%   BMI 20.97 kg/m²     General Appearance AOx3, in no acute distress  Eyes Conjunctivae/corneas clear. PERRL, EOM's intact. No scleral icterus  Ears, Nose, Throat ENT exam normal, no neck nodes or sinus tenderness  Neck supple, no significant adenopathy. No notable JVD  Respiratory No chest wall deformities or tenderness, respiratory effort normal, no use of accessory muscles. Cardiovascular RRR. No chest wall tenderness. Gastrointestinal Abdomen soft, nontender, nondistended. BS x4. No rebound, guarding, or rigidity present. No palpable masses. No CVA tenderness recurrent ventral hernia  Lymphatics No palpable lymphadenopathy, no hepatosplenomegaly  Musculoskeletal No joint tenderness, deformity or swelling. Full ROM UE/LE. Distal pulses intact UE/LE. No edema, cyanosis, or venous stasis changes.   Skin Normal coloration and turgor, no rashes, no suspicious skin lesions noted  Neurological alert, oriented, normal speech, no focal findings or movement disorder noted, CN II-XII intact  Psychiatric Alert and oriented, appropriate affect    Images: CT Result (most recent):  CT ABDOMEN WO IV CONTRAST 04/21/2018    Narrative  EXAM:  CT ABD WO CONT    INDICATION:   abdominal pain possible bowel obstruction    COMPARISON: Plain abdominal x-ray April 21, 2018 at 0041 hours. TECHNIQUE: Unenhanced multislice helical CT was performed from the diaphragm to  the upper pelvis without oral or intravenous contrast administration. Contiguous  5 mm axial images were reconstructed and lung and soft tissue windows were  generated. Coronal and sagittal reformations were generated. CT dose reduction  was achieved through use of a standardized protocol tailored for this  examination and automatic exposure control for dose modulation. Oral contrast was used. FINDINGS:    There is dependent atelectasis at the lung bases. Abdomen findings:    Absence of IV contrast limits sensitivity. The liver and the spleen are normal in contour. High density fluid within the  gallbladder may be secondary to vicarious excretion of contrast (IV contrast  given during chest CT performed yesterday). Unenhanced pancreas, and adrenal glands are unremarkable. Residual IV contrast  is present in the renal collecting system. There is a 10 mm hypodense lesion at  the upper pole of the right kidney. Abdominal aorta is normal in course and caliber with atherosclerotic  calcifications. Stomach is normal in contour. Small bowel loops are normal in caliber. No small  bowel obstruction. There is an approximately 5 cm midline lower anterior abdominal wall defect. There is associated herniation of proximal ascending colon. Appendix is not  positively identified. There is moderate to large stool volume in the colon. There is no free air or free fluid. Surrounding bones are intact. Impression  IMPRESSION:    1. An approximately 5 cm midline lower anterior abdominal wall defect. Associated bowel herniation of the proximal ascending colon. No bowel  obstruction. 2. Moderate stool volume in the colon. Negative for colitis or diverticulitis.     3. A 10 mm hypodense lesion at the upper pole of the right kidney, likely  proteinaceous/hemorrhagic cyst. This is incompletely evaluated. Outpatient renal  ultrasound is suggested to better characterize. Assessment:  Recurrent ventral hernia    Plan: Will proceed with robotic laparoscopic ventral hernia repair with mesh. Technical details of the procedure are reviewed. Risks reviewed include risks of anesthesia, bleeding, infection, visceral injury, persistent post-operative pain, and hernia recurrence. All questions are answered.   We did discuss possibly needing to do this open but my hopes is to accomplish this robotically

## 2023-03-09 NOTE — PRE-PROCEDURE INSTRUCTIONS
Patient verified name and . Order for consent was found in EHR and matches case posting; patient verifies procedure. Type 2 surgery, phone assessment complete. Orders were received. Labs per surgeon: none at present time  Labs per anesthesia protocol: none per protocol  EKG am of surgery. Patient states not able to in prior to surgery    Patient answered medical/surgical history questions at their best of ability. All prior to admission medications documented in EPIC. Patient instructed to take the following medications the day of surgery according to anesthesia guidelines with a small sip of water: Aspirin     On the day before surgery please take Acetaminophen 1000mg in the morning and then again before bed. You may substitute for Tylenol 650 mg. Hold all vitamins 7 days prior to surgery and NSAIDS 5 days prior to surgery. Prescription meds to hold:none  Patient instructed on the following:    > Arrive at [facility] main Entrance, time of arrival to be called the day before by 1700  > NPO after midnight, unless otherwise indicated, including gum, mints, and ice chips  > Responsible adult must drive patient to the hospital, stay during surgery, and patient will need supervision 24 hours after anesthesia  > Use antibacterial soap in shower the night before surgery and on the morning of surgery  > All piercings must be removed prior to arrival.    > Leave all valuables (money and jewelry) at home but bring insurance card and ID on DOS.   > You may be required to pay a deductible or co-pay on the day of your procedure. You can pre-pay by calling [phone] if your surgery is at the [facility] or [phone] if your surgery is at the [facility]. > Do not wear make-up, nail polish, lotions, cologne, perfumes, powders, or oil on skin. Artificial nails are not permitted.

## 2023-03-14 ENCOUNTER — INITIAL CONSULT (OUTPATIENT)
Dept: CARDIOLOGY CLINIC | Age: 68
End: 2023-03-14
Payer: MEDICAID

## 2023-03-14 VITALS
BODY MASS INDEX: 21.25 KG/M2 | SYSTOLIC BLOOD PRESSURE: 112 MMHG | DIASTOLIC BLOOD PRESSURE: 88 MMHG | HEART RATE: 77 BPM | WEIGHT: 143.5 LBS | HEIGHT: 69 IN

## 2023-03-14 DIAGNOSIS — Z76.89 ENCOUNTER TO ESTABLISH CARE: Primary | ICD-10-CM

## 2023-03-14 DIAGNOSIS — Z72.0 TOBACCO ABUSE: ICD-10-CM

## 2023-03-14 DIAGNOSIS — I25.10 CORONARY ARTERY DISEASE INVOLVING NATIVE CORONARY ARTERY OF NATIVE HEART WITHOUT ANGINA PECTORIS: ICD-10-CM

## 2023-03-14 DIAGNOSIS — Z01.818 ENCOUNTER FOR PERIOPERATIVE CONSULTATION: ICD-10-CM

## 2023-03-14 PROCEDURE — 99204 OFFICE O/P NEW MOD 45 MIN: CPT | Performed by: INTERNAL MEDICINE

## 2023-03-14 PROCEDURE — 93000 ELECTROCARDIOGRAM COMPLETE: CPT | Performed by: INTERNAL MEDICINE

## 2023-03-14 PROCEDURE — 1123F ACP DISCUSS/DSCN MKR DOCD: CPT | Performed by: INTERNAL MEDICINE

## 2023-03-14 ASSESSMENT — ENCOUNTER SYMPTOMS
GASTROINTESTINAL NEGATIVE: 1
SHORTNESS OF BREATH: 0
ABDOMINAL PAIN: 0
PHOTOPHOBIA: 0
EYE PAIN: 0
CHEST TIGHTNESS: 0
BACK PAIN: 0
EYES NEGATIVE: 1
ALLERGIC/IMMUNOLOGIC NEGATIVE: 1
RESPIRATORY NEGATIVE: 1

## 2023-03-14 NOTE — PROGRESS NOTES
[address], [address]  PHONE: [phone]    Mendel Gilbert  1955    SUBJECTIVE:   Mendel Gilbert is a 78 y.o. male seen for initial evaluation of:      Chief Complaint   Patient presents with    Consultation     Hx of myocardial infarction        Cardiac Hx (Reviewed and summarized by me):  Homeless  1) CAD   Chest pain 4/20/18 - ER visit with CP that resulted in a Cath ([facility]) with largely normal CAD moderate RCA disease IFR negative (hx of cocaine use according to EMR no UDS done)  2) Lipids   3/8/23 - HDL 45, LDL 63, Trig 90  3) HTN  4) Tobacco abuse - 1 robert will last two weeks    HPI:  58-year-old male initially seen back in 2018 had symptoms concerning for acute myocardial infarction but did not have acute myocardial infarction he has some moderate nonobstructive coronary artery disease by cath at that time. Has been lost to follow-up has a significant umbilical hernia and has plans to have it surgically corrected next week. He denies chest pain he is an active smoker but only smokes 1 pack every couple weeks. He has a history of cocaine abuse but has not used in some time. His last lipid panel is listed above. His EKG is benign. ECG: NSR with RSR' normal axis no ischemia (Independent review/interpretation by me)    Past Medical History, Past Surgical History, Family history, Social History, and Medications were all reviewed with the patient today and updated as necessary.        Current Outpatient Medications:     vitamin D (D3 5000) 125 MCG (5000 UT) CAPS capsule, Take 1 capsule by mouth daily, Disp: , Rfl: 0    aspirin EC 81 MG EC tablet, Take 1 tablet by mouth daily, Disp: 90 tablet, Rfl: 1  No Known Allergies  Past Medical History:   Diagnosis Date    Other ill-defined conditions(247.27)      Past Surgical History:   Procedure Laterality Date    UMBILICAL HERNIA REPAIR  2006     Family History   Problem Relation Age of Onset    No Known Problems Mother No Known Problems Father      Social History     Tobacco Use    Smoking status: Every Day     Packs/day: 0.25     Years: 30.00     Pack years: 7.50     Types: Cigarettes    Smokeless tobacco: Never    Tobacco comments:     Began smoking age 16   Substance Use Topics    Alcohol use: No       ROS:  Review of Systems   Constitutional: Negative. Negative for fever. HENT:  Negative for hearing loss, nosebleeds and tinnitus. Eyes: Negative. Negative for photophobia and pain. Respiratory: Negative. Negative for chest tightness and shortness of breath. Cardiovascular: Negative. Negative for chest pain, palpitations and leg swelling. Gastrointestinal: Negative. Negative for abdominal pain. Endocrine: Negative. Negative for cold intolerance and heat intolerance. Genitourinary: Negative. Negative for dysuria. Musculoskeletal: Negative. Negative for back pain and joint swelling. Skin: Negative. Negative for rash. Allergic/Immunologic: Negative. Negative for immunocompromised state. Neurological: Negative. Negative for dizziness, syncope and light-headedness. Hematological: Negative. Does not bruise/bleed easily. Psychiatric/Behavioral: Negative. Negative for suicidal ideas. PHYSICAL EXAM:  Physical Exam  Constitutional:       General: He is not in acute distress. Appearance: He is not ill-appearing. HENT:      Head: Normocephalic and atraumatic. Nose: No congestion. Mouth/Throat:      Mouth: Mucous membranes are moist.   Eyes:      Extraocular Movements: Extraocular movements intact. Pupils: Pupils are equal, round, and reactive to light. Cardiovascular:      Rate and Rhythm: Normal rate and regular rhythm. Heart sounds: No murmur heard. No friction rub. No gallop. Pulmonary:      Effort: No respiratory distress. Breath sounds: No wheezing or rhonchi. Musculoskeletal:         General: No swelling.       Cervical back: Normal range of motion. Right lower leg: No edema. Left lower leg: No edema. Skin:     General: Skin is warm and dry. Findings: No rash. Neurological:      General: No focal deficit present. Mental Status: He is oriented to person, place, and time. Psychiatric:         Mood and Affect: Mood normal.         Behavior: Behavior normal.         Judgment: Judgment normal.        /88   Pulse 77   Ht 5' 9\" (1.753 m)   Wt 143 lb 8 oz (65.1 kg)   BMI 21.19 kg/m²      Wt Readings from Last 10 Encounters:   03/14/23 143 lb 8 oz (65.1 kg)   03/02/23 142 lb (64.4 kg)   02/24/23 142 lb 12.8 oz (64.8 kg)           Medical problems and test results were reviewed with the patient today. Lab Results   Component Value Date/Time    BUN 32 03/08/2023 08:44 AM     No results found for: STEFFANIE, CREAPOC, CREA  Lab Results   Component Value Date/Time    K 4.3 03/08/2023 08:44 AM       Lab Results   Component Value Date/Time    CHOL 126 03/08/2023 08:44 AM    HDL 45 03/08/2023 08:44 AM       ASSESSMENT and PLAN    ICD-10-CM    1. Encounter to establish care  Z76.89 EKG 12 lead      2. Tobacco abuse  Z72.0       3. Coronary artery disease involving native coronary artery of native heart without angina pectoris  I25.10       4. Encounter for perioperative consultation  Z01.818           IMPRESSION:  A/P  1) CAD -I recommend continuing aspirin 81 mg daily. Denies symptoms. 2) Lipids - at goal off therapy panel is controlled and I do not think he needs further therapy for lipids. 3) BP - controlled off therapy, continue regular exercise  4) perioperative evaluation he is exhibiting no unstable symptoms consistent with cardiac problem. I feel he is low risk for perioperative cardiovascular complication. I recommend him to continue on aspirin daily. We will plan on seeing him back in 3 months after his surgery.   4) tobacco abuse use I have encouraged him to quit  5) Substance abuse - pt reports abstaining from cocaine use    ORDERS  Orders Placed This Encounter    EKG 12 lead     Order Specific Question:   Reason for Exam?     Answer: Other       Follow up in 3 months. Thank you for allowing me to participate in this patient's care. Please call or contact me if there are any questions or concerns regarding the above.       Aren Delarosa MD  03/14/23  10:48 AM

## 2023-03-16 ENCOUNTER — ANESTHESIA EVENT (OUTPATIENT)
Dept: SURGERY | Age: 68
End: 2023-03-16
Payer: MEDICAID

## 2023-03-17 ENCOUNTER — ANESTHESIA (OUTPATIENT)
Dept: SURGERY | Age: 68
End: 2023-03-17
Payer: MEDICAID

## 2023-03-17 ENCOUNTER — HOSPITAL ENCOUNTER (OUTPATIENT)
Age: 68
Discharge: HOME | End: 2023-03-18
Attending: STUDENT IN AN ORGANIZED HEALTH CARE EDUCATION/TRAINING PROGRAM | Admitting: STUDENT IN AN ORGANIZED HEALTH CARE EDUCATION/TRAINING PROGRAM
Payer: MEDICAID

## 2023-03-17 DIAGNOSIS — K43.9 VENTRAL HERNIA WITHOUT OBSTRUCTION OR GANGRENE: Primary | ICD-10-CM

## 2023-03-17 PROBLEM — Z87.19 S/P HERNIA REPAIR: Status: ACTIVE | Noted: 2023-03-17

## 2023-03-17 LAB
EKG ATRIAL RATE: 66 BPM
EKG DIAGNOSIS: NORMAL
EKG P AXIS: 76 DEGREES
EKG P-R INTERVAL: 154 MS
EKG Q-T INTERVAL: 434 MS
EKG QRS DURATION: 84 MS
EKG QTC CALCULATION (BAZETT): 454 MS
EKG R AXIS: 54 DEGREES
EKG T AXIS: 66 DEGREES
EKG VENTRICULAR RATE: 66 BPM

## 2023-03-17 PROCEDURE — 6370000000 HC RX 637 (ALT 250 FOR IP): Performed by: STUDENT IN AN ORGANIZED HEALTH CARE EDUCATION/TRAINING PROGRAM

## 2023-03-17 PROCEDURE — 2580000003 HC RX 258: Performed by: ANESTHESIOLOGY

## 2023-03-17 PROCEDURE — 2580000003 HC RX 258: Performed by: STUDENT IN AN ORGANIZED HEALTH CARE EDUCATION/TRAINING PROGRAM

## 2023-03-17 PROCEDURE — 6360000002 HC RX W HCPCS: Performed by: NURSE ANESTHETIST, CERTIFIED REGISTERED

## 2023-03-17 PROCEDURE — 3700000001 HC ADD 15 MINUTES (ANESTHESIA): Performed by: STUDENT IN AN ORGANIZED HEALTH CARE EDUCATION/TRAINING PROGRAM

## 2023-03-17 PROCEDURE — 6360000002 HC RX W HCPCS: Performed by: ANESTHESIOLOGY

## 2023-03-17 PROCEDURE — 2580000003 HC RX 258: Performed by: NURSE ANESTHETIST, CERTIFIED REGISTERED

## 2023-03-17 PROCEDURE — 2720000010 HC SURG SUPPLY STERILE: Performed by: STUDENT IN AN ORGANIZED HEALTH CARE EDUCATION/TRAINING PROGRAM

## 2023-03-17 PROCEDURE — 6370000000 HC RX 637 (ALT 250 FOR IP): Performed by: ANESTHESIOLOGY

## 2023-03-17 PROCEDURE — 2709999900 HC NON-CHARGEABLE SUPPLY: Performed by: STUDENT IN AN ORGANIZED HEALTH CARE EDUCATION/TRAINING PROGRAM

## 2023-03-17 PROCEDURE — 93005 ELECTROCARDIOGRAM TRACING: CPT | Performed by: ANESTHESIOLOGY

## 2023-03-17 PROCEDURE — 2500000003 HC RX 250 WO HCPCS: Performed by: STUDENT IN AN ORGANIZED HEALTH CARE EDUCATION/TRAINING PROGRAM

## 2023-03-17 PROCEDURE — 7100000000 HC PACU RECOVERY - FIRST 15 MIN: Performed by: STUDENT IN AN ORGANIZED HEALTH CARE EDUCATION/TRAINING PROGRAM

## 2023-03-17 PROCEDURE — 3600000009 HC SURGERY ROBOT BASE: Performed by: STUDENT IN AN ORGANIZED HEALTH CARE EDUCATION/TRAINING PROGRAM

## 2023-03-17 PROCEDURE — C1781 MESH (IMPLANTABLE): HCPCS | Performed by: STUDENT IN AN ORGANIZED HEALTH CARE EDUCATION/TRAINING PROGRAM

## 2023-03-17 PROCEDURE — S2900 ROBOTIC SURGICAL SYSTEM: HCPCS | Performed by: STUDENT IN AN ORGANIZED HEALTH CARE EDUCATION/TRAINING PROGRAM

## 2023-03-17 PROCEDURE — 49616 RPR AA HRN RCR 3-10 NCR/STRN: CPT | Performed by: STUDENT IN AN ORGANIZED HEALTH CARE EDUCATION/TRAINING PROGRAM

## 2023-03-17 PROCEDURE — 3600000019 HC SURGERY ROBOT ADDTL 15MIN: Performed by: STUDENT IN AN ORGANIZED HEALTH CARE EDUCATION/TRAINING PROGRAM

## 2023-03-17 PROCEDURE — 7100000001 HC PACU RECOVERY - ADDTL 15 MIN: Performed by: STUDENT IN AN ORGANIZED HEALTH CARE EDUCATION/TRAINING PROGRAM

## 2023-03-17 PROCEDURE — 3700000000 HC ANESTHESIA ATTENDED CARE: Performed by: STUDENT IN AN ORGANIZED HEALTH CARE EDUCATION/TRAINING PROGRAM

## 2023-03-17 PROCEDURE — 2500000003 HC RX 250 WO HCPCS: Performed by: NURSE ANESTHETIST, CERTIFIED REGISTERED

## 2023-03-17 DEVICE — MESH HERN L W5.4XL7IN POLYPR EPTFE OVL SELF EXP PTCH FOR: Type: IMPLANTABLE DEVICE | Site: ABDOMEN | Status: FUNCTIONAL

## 2023-03-17 RX ORDER — NEOSTIGMINE METHYLSULFATE 1 MG/ML
INJECTION, SOLUTION INTRAVENOUS PRN
Status: DISCONTINUED | OUTPATIENT
Start: 2023-03-17 | End: 2023-03-17 | Stop reason: SDUPTHER

## 2023-03-17 RX ORDER — PROPOFOL 10 MG/ML
INJECTION, EMULSION INTRAVENOUS PRN
Status: DISCONTINUED | OUTPATIENT
Start: 2023-03-17 | End: 2023-03-17 | Stop reason: SDUPTHER

## 2023-03-17 RX ORDER — SODIUM CHLORIDE, SODIUM LACTATE, POTASSIUM CHLORIDE, CALCIUM CHLORIDE 600; 310; 30; 20 MG/100ML; MG/100ML; MG/100ML; MG/100ML
INJECTION, SOLUTION INTRAVENOUS CONTINUOUS
Status: DISCONTINUED | OUTPATIENT
Start: 2023-03-17 | End: 2023-03-17 | Stop reason: HOSPADM

## 2023-03-17 RX ORDER — FENTANYL CITRATE 50 UG/ML
INJECTION, SOLUTION INTRAMUSCULAR; INTRAVENOUS PRN
Status: DISCONTINUED | OUTPATIENT
Start: 2023-03-17 | End: 2023-03-17 | Stop reason: SDUPTHER

## 2023-03-17 RX ORDER — ACETAMINOPHEN 500 MG
1000 TABLET ORAL ONCE
Status: COMPLETED | OUTPATIENT
Start: 2023-03-17 | End: 2023-03-17

## 2023-03-17 RX ORDER — ALBUTEROL SULFATE 2.5 MG/3ML
2.5 SOLUTION RESPIRATORY (INHALATION)
Status: DISCONTINUED | OUTPATIENT
Start: 2023-03-17 | End: 2023-03-17 | Stop reason: HOSPADM

## 2023-03-17 RX ORDER — IPRATROPIUM BROMIDE AND ALBUTEROL SULFATE 2.5; .5 MG/3ML; MG/3ML
1 SOLUTION RESPIRATORY (INHALATION)
Status: DISCONTINUED | OUTPATIENT
Start: 2023-03-17 | End: 2023-03-17 | Stop reason: HOSPADM

## 2023-03-17 RX ORDER — HYDROMORPHONE HYDROCHLORIDE 2 MG/ML
0.25 INJECTION, SOLUTION INTRAMUSCULAR; INTRAVENOUS; SUBCUTANEOUS
Status: DISCONTINUED | OUTPATIENT
Start: 2023-03-17 | End: 2023-03-17 | Stop reason: HOSPADM

## 2023-03-17 RX ORDER — HYDROMORPHONE HYDROCHLORIDE 2 MG/ML
0.5 INJECTION, SOLUTION INTRAMUSCULAR; INTRAVENOUS; SUBCUTANEOUS EVERY 5 MIN PRN
Status: DISCONTINUED | OUTPATIENT
Start: 2023-03-17 | End: 2023-03-17 | Stop reason: HOSPADM

## 2023-03-17 RX ORDER — LIDOCAINE HYDROCHLORIDE 20 MG/ML
INJECTION, SOLUTION EPIDURAL; INFILTRATION; INTRACAUDAL; PERINEURAL PRN
Status: DISCONTINUED | OUTPATIENT
Start: 2023-03-17 | End: 2023-03-17 | Stop reason: SDUPTHER

## 2023-03-17 RX ORDER — ASPIRIN 81 MG/1
81 TABLET, CHEWABLE ORAL
Status: COMPLETED | OUTPATIENT
Start: 2023-03-17 | End: 2023-03-17

## 2023-03-17 RX ORDER — METHOCARBAMOL 750 MG/1
750 TABLET, FILM COATED ORAL 3 TIMES DAILY
Qty: 30 TABLET | Refills: 0 | Status: ON HOLD | OUTPATIENT
Start: 2023-03-17 | End: 2023-03-27

## 2023-03-17 RX ORDER — BUPIVACAINE HYDROCHLORIDE 2.5 MG/ML
INJECTION, SOLUTION EPIDURAL; INFILTRATION; INTRACAUDAL PRN
Status: DISCONTINUED | OUTPATIENT
Start: 2023-03-17 | End: 2023-03-17 | Stop reason: HOSPADM

## 2023-03-17 RX ORDER — OXYCODONE HYDROCHLORIDE AND ACETAMINOPHEN 5; 325 MG/1; MG/1
1 TABLET ORAL EVERY 4 HOURS PRN
Qty: 15 TABLET | Refills: 0 | Status: SHIPPED | OUTPATIENT
Start: 2023-03-17 | End: 2023-03-22

## 2023-03-17 RX ORDER — SENNA AND DOCUSATE SODIUM 50; 8.6 MG/1; MG/1
1 TABLET, FILM COATED ORAL 2 TIMES DAILY
Status: DISCONTINUED | OUTPATIENT
Start: 2023-03-17 | End: 2023-03-18 | Stop reason: HOSPADM

## 2023-03-17 RX ORDER — ONDANSETRON 2 MG/ML
4 INJECTION INTRAMUSCULAR; INTRAVENOUS EVERY 6 HOURS PRN
Status: DISCONTINUED | OUTPATIENT
Start: 2023-03-17 | End: 2023-03-18 | Stop reason: HOSPADM

## 2023-03-17 RX ORDER — OXYCODONE HYDROCHLORIDE 5 MG/1
10 TABLET ORAL PRN
Status: COMPLETED | OUTPATIENT
Start: 2023-03-17 | End: 2023-03-17

## 2023-03-17 RX ORDER — SODIUM CHLORIDE 0.9 % (FLUSH) 0.9 %
5-40 SYRINGE (ML) INJECTION PRN
Status: DISCONTINUED | OUTPATIENT
Start: 2023-03-17 | End: 2023-03-18 | Stop reason: HOSPADM

## 2023-03-17 RX ORDER — SODIUM CHLORIDE 9 MG/ML
INJECTION, SOLUTION INTRAVENOUS PRN
Status: DISCONTINUED | OUTPATIENT
Start: 2023-03-17 | End: 2023-03-18 | Stop reason: HOSPADM

## 2023-03-17 RX ORDER — GLYCOPYRROLATE 0.2 MG/ML
INJECTION INTRAMUSCULAR; INTRAVENOUS PRN
Status: DISCONTINUED | OUTPATIENT
Start: 2023-03-17 | End: 2023-03-17 | Stop reason: SDUPTHER

## 2023-03-17 RX ORDER — SODIUM CHLORIDE 0.9 % (FLUSH) 0.9 %
5-40 SYRINGE (ML) INJECTION EVERY 12 HOURS SCHEDULED
Status: DISCONTINUED | OUTPATIENT
Start: 2023-03-17 | End: 2023-03-17 | Stop reason: HOSPADM

## 2023-03-17 RX ORDER — ROCURONIUM BROMIDE 10 MG/ML
INJECTION, SOLUTION INTRAVENOUS PRN
Status: DISCONTINUED | OUTPATIENT
Start: 2023-03-17 | End: 2023-03-17 | Stop reason: SDUPTHER

## 2023-03-17 RX ORDER — DEXTROSE MONOHYDRATE 100 MG/ML
INJECTION, SOLUTION INTRAVENOUS CONTINUOUS PRN
Status: DISCONTINUED | OUTPATIENT
Start: 2023-03-17 | End: 2023-03-17 | Stop reason: HOSPADM

## 2023-03-17 RX ORDER — ESMOLOL HYDROCHLORIDE 10 MG/ML
INJECTION INTRAVENOUS PRN
Status: DISCONTINUED | OUTPATIENT
Start: 2023-03-17 | End: 2023-03-17 | Stop reason: SDUPTHER

## 2023-03-17 RX ORDER — ACETAMINOPHEN 325 MG/1
650 TABLET ORAL EVERY 6 HOURS
Status: DISCONTINUED | OUTPATIENT
Start: 2023-03-17 | End: 2023-03-18 | Stop reason: HOSPADM

## 2023-03-17 RX ORDER — OXYCODONE HYDROCHLORIDE 5 MG/1
5 TABLET ORAL PRN
Status: COMPLETED | OUTPATIENT
Start: 2023-03-17 | End: 2023-03-17

## 2023-03-17 RX ORDER — SODIUM CHLORIDE 0.9 % (FLUSH) 0.9 %
5-40 SYRINGE (ML) INJECTION EVERY 12 HOURS SCHEDULED
Status: DISCONTINUED | OUTPATIENT
Start: 2023-03-17 | End: 2023-03-18 | Stop reason: HOSPADM

## 2023-03-17 RX ORDER — METHOCARBAMOL 750 MG/1
750 TABLET, FILM COATED ORAL 4 TIMES DAILY
Status: DISCONTINUED | OUTPATIENT
Start: 2023-03-17 | End: 2023-03-18 | Stop reason: HOSPADM

## 2023-03-17 RX ORDER — SODIUM CHLORIDE 0.9 % (FLUSH) 0.9 %
5-40 SYRINGE (ML) INJECTION PRN
Status: DISCONTINUED | OUTPATIENT
Start: 2023-03-17 | End: 2023-03-17 | Stop reason: HOSPADM

## 2023-03-17 RX ORDER — MIDAZOLAM HYDROCHLORIDE 2 MG/2ML
2 INJECTION, SOLUTION INTRAMUSCULAR; INTRAVENOUS
Status: DISCONTINUED | OUTPATIENT
Start: 2023-03-17 | End: 2023-03-17 | Stop reason: HOSPADM

## 2023-03-17 RX ORDER — SODIUM CHLORIDE 9 MG/ML
INJECTION, SOLUTION INTRAVENOUS PRN
Status: DISCONTINUED | OUTPATIENT
Start: 2023-03-17 | End: 2023-03-17 | Stop reason: HOSPADM

## 2023-03-17 RX ORDER — ONDANSETRON 4 MG/1
4 TABLET, ORALLY DISINTEGRATING ORAL EVERY 8 HOURS PRN
Status: DISCONTINUED | OUTPATIENT
Start: 2023-03-17 | End: 2023-03-18 | Stop reason: HOSPADM

## 2023-03-17 RX ORDER — OXYCODONE HYDROCHLORIDE 5 MG/1
5 TABLET ORAL EVERY 4 HOURS PRN
Status: DISCONTINUED | OUTPATIENT
Start: 2023-03-17 | End: 2023-03-18 | Stop reason: HOSPADM

## 2023-03-17 RX ORDER — HYDROMORPHONE HYDROCHLORIDE 2 MG/ML
0.5 INJECTION, SOLUTION INTRAMUSCULAR; INTRAVENOUS; SUBCUTANEOUS
Status: DISCONTINUED | OUTPATIENT
Start: 2023-03-17 | End: 2023-03-17 | Stop reason: HOSPADM

## 2023-03-17 RX ORDER — LIDOCAINE HYDROCHLORIDE 10 MG/ML
1 INJECTION, SOLUTION INFILTRATION; PERINEURAL
Status: DISCONTINUED | OUTPATIENT
Start: 2023-03-17 | End: 2023-03-17 | Stop reason: HOSPADM

## 2023-03-17 RX ORDER — DOCUSATE SODIUM 100 MG/1
100 CAPSULE, LIQUID FILLED ORAL 2 TIMES DAILY
Qty: 60 CAPSULE | Refills: 0 | Status: ON HOLD | OUTPATIENT
Start: 2023-03-17 | End: 2023-04-16

## 2023-03-17 RX ORDER — LABETALOL HYDROCHLORIDE 5 MG/ML
10 INJECTION, SOLUTION INTRAVENOUS
Status: DISCONTINUED | OUTPATIENT
Start: 2023-03-17 | End: 2023-03-17 | Stop reason: HOSPADM

## 2023-03-17 RX ORDER — ONDANSETRON 2 MG/ML
4 INJECTION INTRAMUSCULAR; INTRAVENOUS
Status: DISCONTINUED | OUTPATIENT
Start: 2023-03-17 | End: 2023-03-17 | Stop reason: HOSPADM

## 2023-03-17 RX ORDER — ONDANSETRON 2 MG/ML
INJECTION INTRAMUSCULAR; INTRAVENOUS PRN
Status: DISCONTINUED | OUTPATIENT
Start: 2023-03-17 | End: 2023-03-17 | Stop reason: SDUPTHER

## 2023-03-17 RX ORDER — HYDROMORPHONE HYDROCHLORIDE 2 MG/ML
0.25 INJECTION, SOLUTION INTRAMUSCULAR; INTRAVENOUS; SUBCUTANEOUS EVERY 5 MIN PRN
Status: DISCONTINUED | OUTPATIENT
Start: 2023-03-17 | End: 2023-03-17 | Stop reason: HOSPADM

## 2023-03-17 RX ADMIN — FENTANYL CITRATE 50 MCG: 50 INJECTION, SOLUTION INTRAMUSCULAR; INTRAVENOUS at 10:03

## 2023-03-17 RX ADMIN — SODIUM CHLORIDE, SODIUM LACTATE, POTASSIUM CHLORIDE, AND CALCIUM CHLORIDE: 600; 310; 30; 20 INJECTION, SOLUTION INTRAVENOUS at 07:42

## 2023-03-17 RX ADMIN — SENNOSIDES AND DOCUSATE SODIUM 1 TABLET: 8.6; 5 TABLET ORAL at 20:37

## 2023-03-17 RX ADMIN — PROPOFOL 150 MG: 10 INJECTION, EMULSION INTRAVENOUS at 09:43

## 2023-03-17 RX ADMIN — PHENYLEPHRINE HYDROCHLORIDE 50 MCG: 10 INJECTION INTRAVENOUS at 10:29

## 2023-03-17 RX ADMIN — Medication 2 G: at 09:55

## 2023-03-17 RX ADMIN — ESMOLOL HYDROCHLORIDE 30 MG: 10 INJECTION, SOLUTION INTRAVENOUS at 09:47

## 2023-03-17 RX ADMIN — ACETAMINOPHEN 650 MG: 325 TABLET ORAL at 15:57

## 2023-03-17 RX ADMIN — METHOCARBAMOL TABLETS 750 MG: 750 TABLET, COATED ORAL at 20:37

## 2023-03-17 RX ADMIN — GLYCOPYRROLATE 0.4 MG: 0.2 INJECTION INTRAMUSCULAR; INTRAVENOUS at 10:35

## 2023-03-17 RX ADMIN — PHENYLEPHRINE HYDROCHLORIDE 150 MCG: 10 INJECTION INTRAVENOUS at 10:08

## 2023-03-17 RX ADMIN — SODIUM CHLORIDE, PRESERVATIVE FREE 10 ML: 5 INJECTION INTRAVENOUS at 20:39

## 2023-03-17 RX ADMIN — LIDOCAINE HYDROCHLORIDE 60 MG: 20 INJECTION, SOLUTION EPIDURAL; INFILTRATION; INTRACAUDAL; PERINEURAL at 09:43

## 2023-03-17 RX ADMIN — ONDANSETRON 4 MG: 2 INJECTION INTRAMUSCULAR; INTRAVENOUS at 10:20

## 2023-03-17 RX ADMIN — ROCURONIUM BROMIDE 40 MG: 50 INJECTION, SOLUTION INTRAVENOUS at 09:44

## 2023-03-17 RX ADMIN — PHENYLEPHRINE HYDROCHLORIDE 50 MCG: 10 INJECTION INTRAVENOUS at 09:53

## 2023-03-17 RX ADMIN — PHENYLEPHRINE HYDROCHLORIDE 50 MCG: 10 INJECTION INTRAVENOUS at 10:10

## 2023-03-17 RX ADMIN — PHENYLEPHRINE HYDROCHLORIDE 100 MCG: 10 INJECTION INTRAVENOUS at 10:17

## 2023-03-17 RX ADMIN — OXYCODONE HYDROCHLORIDE 5 MG: 5 TABLET ORAL at 11:05

## 2023-03-17 RX ADMIN — OXYCODONE 5 MG: 5 TABLET ORAL at 18:36

## 2023-03-17 RX ADMIN — METHOCARBAMOL TABLETS 750 MG: 750 TABLET, COATED ORAL at 17:27

## 2023-03-17 RX ADMIN — PHENYLEPHRINE HYDROCHLORIDE 100 MCG: 10 INJECTION INTRAVENOUS at 10:31

## 2023-03-17 RX ADMIN — ACETAMINOPHEN 650 MG: 325 TABLET ORAL at 20:37

## 2023-03-17 RX ADMIN — OXYCODONE 5 MG: 5 TABLET ORAL at 14:50

## 2023-03-17 RX ADMIN — HYDROMORPHONE HYDROCHLORIDE 0.5 MG: 2 INJECTION, SOLUTION INTRAMUSCULAR; INTRAVENOUS; SUBCUTANEOUS at 11:05

## 2023-03-17 RX ADMIN — ACETAMINOPHEN 1000 MG: 500 TABLET, FILM COATED ORAL at 07:42

## 2023-03-17 RX ADMIN — SODIUM CHLORIDE, SODIUM LACTATE, POTASSIUM CHLORIDE, AND CALCIUM CHLORIDE: 600; 310; 30; 20 INJECTION, SOLUTION INTRAVENOUS at 09:37

## 2023-03-17 RX ADMIN — ASPIRIN 81 MG 81 MG: 81 TABLET ORAL at 08:45

## 2023-03-17 RX ADMIN — Medication 3 MG: at 10:35

## 2023-03-17 RX ADMIN — FENTANYL CITRATE 50 MCG: 50 INJECTION, SOLUTION INTRAMUSCULAR; INTRAVENOUS at 09:43

## 2023-03-17 RX ADMIN — ESMOLOL HYDROCHLORIDE 10 MG: 10 INJECTION, SOLUTION INTRAVENOUS at 10:00

## 2023-03-17 ASSESSMENT — PAIN SCALES - GENERAL
PAINLEVEL_OUTOF10: 10
PAINLEVEL_OUTOF10: 7
PAINLEVEL_OUTOF10: 10
PAINLEVEL_OUTOF10: 5

## 2023-03-17 ASSESSMENT — PAIN DESCRIPTION - ORIENTATION
ORIENTATION: ANTERIOR

## 2023-03-17 ASSESSMENT — PAIN - FUNCTIONAL ASSESSMENT
PAIN_FUNCTIONAL_ASSESSMENT: 0-10
PAIN_FUNCTIONAL_ASSESSMENT: PREVENTS OR INTERFERES SOME ACTIVE ACTIVITIES AND ADLS
PAIN_FUNCTIONAL_ASSESSMENT: 0-10
PAIN_FUNCTIONAL_ASSESSMENT: NONE - DENIES PAIN
PAIN_FUNCTIONAL_ASSESSMENT: 0-10

## 2023-03-17 ASSESSMENT — PAIN DESCRIPTION - DESCRIPTORS
DESCRIPTORS: SORE
DESCRIPTORS: SHARP
DESCRIPTORS: TIGHTNESS
DESCRIPTORS: SORE
DESCRIPTORS: ACHING

## 2023-03-17 ASSESSMENT — PAIN DESCRIPTION - PAIN TYPE: TYPE: SURGICAL PAIN

## 2023-03-17 ASSESSMENT — PAIN DESCRIPTION - LOCATION
LOCATION: ABDOMEN

## 2023-03-17 ASSESSMENT — PAIN DESCRIPTION - DIRECTION: RADIATING_TOWARDS: BACK

## 2023-03-17 ASSESSMENT — LIFESTYLE VARIABLES: SMOKING_STATUS: 1

## 2023-03-17 NOTE — PERIOP NOTE
TRANSFER - OUT REPORT:    Verbal report given to [address] [place] [address] on [employer]  being transferred to [facility] for routine post-op       Report consisted of patients Situation, Background, Assessment and   Recommendations(SBAR). Information from the following report(s) Nurse Handoff Report, Adult Overview, Surgery Report, Intake/Output, and MAR was reviewed with the receiving nurse. Lines:   Peripheral IV 03/17/23 Right; Anterior Forearm (Active)   Site Assessment Clean, dry & intact 03/17/23 1055   Line Status Infusing 03/17/23 1055   Line Care Connections checked and tightened 03/17/23 1055   Phlebitis Assessment No symptoms 03/17/23 1055   Infiltration Assessment 0 03/17/23 1055   Alcohol Cap Used No 03/17/23 1055   Dressing Status Clean, dry & intact 03/17/23 1055   Dressing Type Transparent 03/17/23 1055        Opportunity for questions and clarification was provided. Patient transported with:   O2 @ 3 liters    VTE prophylaxis orders have been written for [employer]. Patient and family given floor number and nurses name. Family updated re: pt status after security code verified.

## 2023-03-17 NOTE — PROGRESS NOTES
TRANSFER - IN REPORT:    Verbal report received from [address] on [employer]  being received from PACU for routine progression of patient care      Report consisted of patient's Situation, Background, Assessment and   Recommendations(SBAR). Information from the following report(s) Nurse Handoff Report was reviewed with the receiving nurse. Opportunity for questions and clarification was provided. Assessment completed upon patient's arrival to unit and care assumed.

## 2023-03-17 NOTE — ANESTHESIA POSTPROCEDURE EVALUATION
Department of Anesthesiology  Postprocedure Note    Patient: Sharif Purcell  MRN: [id number]  YOB: 1955  Date of evaluation: 3/17/2023      Procedure Summary     Date: 03/17/23 Room / Location: [facility] MAIN OR  / [facility] MAIN OR    Anesthesia Start: 0933 Anesthesia Stop: 3015    Procedure:  HERNIA VENTRAL REPAIR LAPAROSCOPIC ROBOTIC CONVERTED TO OPEN INCISIONAL HERNIA REPAIR WITH MESH (Abdomen) Diagnosis:       Ventral hernia      (Ventral hernia [K43.9])    Providers: Moisés Randle DO Responsible Provider: Bryan Rhoades MD    Anesthesia Type: General ASA Status: 3          Anesthesia Type: General    Solomon Phase I: Solomon Score: 8    Solomon Phase II:        Anesthesia Post Evaluation    Patient location during evaluation: PACU  Patient participation: complete - patient participated  Level of consciousness: awake and alert  Pain score: 2  Airway patency: patent  Nausea & Vomiting: no nausea and no vomiting  Complications: no  Cardiovascular status: hemodynamically stable  Respiratory status: acceptable, nonlabored ventilation and spontaneous ventilation  Hydration status: euvolemic  Comments: /76   Pulse 58   Temp 97.6 °F (36.4 °C) (Skin)   Resp 16   Ht 5' 9\" (1.753 m)   Wt 142 lb 4 oz (64.5 kg)   SpO2 98%   BMI 21.01 kg/m²     Multimodal analgesia pain management approach

## 2023-03-17 NOTE — DISCHARGE INSTRUCTIONS
DISCHARGE INSTRUCTIONS    Please call our office for a follow-up appointment in 1-2 week(s). Driving: No driving while taking pain medications    Activity: No strenous activity. Slowly advance as tolerated. No lifting greater than 20lbs. Diet:  Slowly advance as tolerated. Increase your fluids and fiber, as pain medications may cause constipation. No alcoholic beverages. Bathing: You may shower. No tub baths for 5 days. Dressing: If outer dressing, remove in 24 hours. Leave steri strips intact. Other:  Ice to incision as needed for pain. If drains present, empty and record    output daily. Call Dr. Lee Peralta if you have:  ? Temperature greater than 100.4  ? Persistent nausea and vomiting  ? Severe uncontrolled pain  ? Redness, tenderness, or signs of infection (pain, swelling, redness, odor or green/yellow discharge around the site)  ? Difficulty breathing, headache or visual disturbances  ? Hives  ? Persistent dizziness or light-headedness  ? Extreme fatigue  ? Any other questions or concerns you may have after discharge    In an emergency, call 911 or go to an Emergency Department at [facility] or [facility].    It is important to bring a complete, current list of your medications to any medical appointments or hospitalizations. I understand and acknowledge receipt of the above instructions. After general anesthesia or intravenous sedation, for 24 hours or while taking prescription Narcotics:  Limit your activities  A responsible adult needs to be with you for the next 24 hours  Do not drive and operate hazardous machinery  Do not make important personal or business decisions  Do not drink alcoholic beverages  If you have not urinated within 8 hours after discharge, and you are experiencing discomfort from urinary retention, please go to the nearest ED. If you have sleep apnea and have a CPAP machine, please use it for all naps and sleeping.   Please use caution when taking narcotics and any of your home medications that may cause drowsiness. *  Please give a list of your current medications to your Primary Care Provider. *  Please update this list whenever your medications are discontinued, doses are      changed, or new medications (including over-the-counter products) are added. *  Please carry medication information at all times in case of emergency situations. These are general instructions for a healthy lifestyle:  No smoking/ No tobacco products/ Avoid exposure to second hand smoke  Surgeon General's Warning:  Quitting smoking now greatly reduces serious risk to your health. Obesity, smoking, and sedentary lifestyle greatly increases your risk for illness  A healthy diet, regular physical exercise & weight monitoring are important for maintaining a healthy lifestyle    You may be retaining fluid if you have a history of heart failure or if you experience any of the following symptoms:  Weight gain of 3 pounds or more overnight or 5 pounds in a week, increased swelling in our hands or feet or shortness of breath while lying flat in bed. Please call your doctor as soon as you notice any of these symptoms; do not wait until your next office visit.

## 2023-03-17 NOTE — ANESTHESIA PROCEDURE NOTES
Airway  Date/Time: 3/17/2023 9:47 AM  Urgency: elective    Airway not difficult    General Information and Staff    Patient location during procedure: OR  Resident/CRNA: FRANCISCO Gomez Cea - CRNA  Performed: resident/CRNA     Indications and Patient Condition  Indications for airway management: anesthesia  Spontaneous Ventilation: absent  Sedation level: deep  Preoxygenated: yes  Patient position: sniffing  MILS not maintained throughout  Mask difficulty assessment: vent by bag mask    Final Airway Details  Final airway type: endotracheal airway      Successful airway: ETT  Cuffed: yes   Successful intubation technique: direct laryngoscopy  Facilitating devices/methods: intubating stylet  Endotracheal tube insertion site: oral  Blade: Ashwin  Blade size: #4  ETT size (mm): 7.5  Cormack-Lehane Classification: grade I - full view of glottis  Placement verified by: chest auscultation and capnometry   Measured from: lips  ETT to lips (cm): 22  Number of attempts at approach: 1  Ventilation between attempts: bag mask  Number of other approaches attempted: 0    Additional Comments  Placed by Axel Kim lips and dentition remain same as prior to procedure

## 2023-03-17 NOTE — PROGRESS NOTES
END OF SHIFT NOTE:    INTAKE/OUTPUT  No intake/output data recorded. Voiding: No  Catheter: No  Drain:              Flatus: Patient does not have flatus present. Stool:  occurrences. Characteristics:           Stool Assessment  Last BM (including prior to admit): 03/16/23 (per pt)    Emesis:  occurrences. Characteristics:        VITAL SIGNS  Patient Vitals for the past 12 hrs:   Temp Pulse Resp BP SpO2   03/17/23 1516 97.5 °F (36.4 °C) 53 18 134/87 100 %   03/17/23 1406 97.3 °F (36.3 °C) 54 24 133/83 100 %   03/17/23 1340 -- 75 18 138/77 96 %   03/17/23 1325 -- 62 16 130/78 99 %   03/17/23 1310 -- 61 18 131/78 99 %   03/17/23 1255 -- 62 18 129/79 98 %   03/17/23 1240 -- 62 18 128/78 97 %   03/17/23 1230 -- 58 16 127/73 98 %   03/17/23 1225 -- 60 16 127/73 98 %   03/17/23 1220 -- 58 16 (!) 141/82 98 %   03/17/23 1215 -- 60 18 129/82 98 %   03/17/23 1210 -- 57 16 128/78 98 %   03/17/23 1205 -- 62 16 131/80 98 %   03/17/23 1200 -- 58 16 123/76 98 %   03/17/23 1155 -- 59 16 124/78 98 %   03/17/23 1150 -- 57 18 123/74 98 %   03/17/23 1140 -- 62 16 121/73 98 %   03/17/23 1135 97.9 °F (36.6 °C) 63 18 125/73 98 %   03/17/23 1130 -- 64 16 126/75 98 %   03/17/23 1125 -- 64 16 125/73 98 %   03/17/23 1120 -- 65 16 124/72 98 %   03/17/23 1115 -- 70 18 123/70 98 %   03/17/23 1110 -- 70 18 126/69 98 %   03/17/23 1105 -- 78 18 124/69 97 %   03/17/23 1100 -- 83 16 126/71 98 %   03/17/23 1055 97.6 °F (36.4 °C) 93 12 129/74 99 %       Pain Assessment  Pain Level: 10 (03/17/23 0766)  Pain Location: Abdomen  Patient's Stated Pain Goal: 0 - No pain    Ambulating  No    Shift report given to oncoming nurse at the bedside.     Shruti Foster

## 2023-03-17 NOTE — OP NOTE
Recurrent hernia repair Note    Name:  Alyx Clark Date/Time of Admission: 3/17/2023  7:12 AM  CSN: [id number]  Attending Provider: Robbin Haney, *  MRN:  [id number]  : 1955 78 y.o. Pre-operative Diagnosis: recurrent incarcerated ventral hernia    Post-operative Diagnosis: same    Procedure:  Diagnostic laparoscopy, open recurrent incarcerated hernia repair 6cm    Surgeon: Pradeep Olivares DO    Anesthesia: General endotracheal anesthesia    Specimen: none    INDICATION: This patient presents with chronic abdominal pain and will undergo diagnostic laparoscopy. The risks, benefits, complications, treatment options, and expected outcomes were discussed with the patient. The possibilities of reaction to medication, pulmonary aspiration, perforation of viscus, bleeding, recurrent infection, the need for additional procedures, failure to diagnose a condition, the possible need to convert to an open procedure, and creating a complication requiring transfusion or operation were discussed with the patient. The patient and/or family concurred with the proposed plan, giving informed consent. DESCRIPTION OF PROCEDURE: Patient is correctly identified in preoperative holding area. Consent was confirmed and placed on the chart. Preoperative antibiotics were administered per SCIP protocol. The patient was then taken back to the operative suite and placed in the supine position. General anesthesia was performed per anesthesia via an endotracheal tube. The patient's abdomen was then prepped and draped in the usual sterile fashion. A timeout was performed and all members of the team that were present were in agreement. The procedure began by locating a spot approximately 2 fingerbreadths below the patient's left subcostal margin here this area was locally anesthetized. Next using a 15 blade scalpel and incision was made through skin down subcutaneous tissues below.  A 5 mm Optiview trocar with a 5 mm 0° laparoscope was placed into this incision and the abdomen was entered under direct visualization while visualizing all layers of the abdominal wall. After entry into the abdomen, insufflation was attached. The obturator was removed and the camera was reinserted. Pneumoperitoneum was achieved and the contents of the abdomen were inspected and found to be free of any defects or harm. I was able to reduce his incarcerated bowel. I then determined his defect was too large not giving me enough working room to repair his hernia. I then desufflated the abdomen, reduced pneumoperitoneum, and removed the trocars under direct visualization. I then excised his excess skin and subq tissue. I then removed his hernia sac with cautery. I utilized a 10x7 in skirted mesh and tacked this into place. I then used 0 vicryl in figure of 8 fashion to close the fascia. I utilized ensorb to close the skin. The patient tolerated the procedure well and was brought to PACU in stable condition. Estimated Blood Loss: Minimal         Complications: None; patient tolerated the procedure well. Disposition: PACU - hemodynamically stable.            Condition: stable    Electronically signed by: Buddy Sue DO 3/17/2023

## 2023-03-18 VITALS
RESPIRATION RATE: 18 BRPM | HEIGHT: 69 IN | HEART RATE: 50 BPM | TEMPERATURE: 97.8 F | SYSTOLIC BLOOD PRESSURE: 140 MMHG | BODY MASS INDEX: 21.07 KG/M2 | DIASTOLIC BLOOD PRESSURE: 80 MMHG | OXYGEN SATURATION: 95 % | WEIGHT: 142.25 LBS

## 2023-03-18 PROCEDURE — 6370000000 HC RX 637 (ALT 250 FOR IP): Performed by: STUDENT IN AN ORGANIZED HEALTH CARE EDUCATION/TRAINING PROGRAM

## 2023-03-18 PROCEDURE — 2580000003 HC RX 258: Performed by: STUDENT IN AN ORGANIZED HEALTH CARE EDUCATION/TRAINING PROGRAM

## 2023-03-18 RX ORDER — ONDANSETRON 4 MG/1
4 TABLET, ORALLY DISINTEGRATING ORAL EVERY 8 HOURS PRN
Qty: 12 TABLET | Refills: 0 | Status: ON HOLD | OUTPATIENT
Start: 2023-03-18

## 2023-03-18 RX ADMIN — METHOCARBAMOL TABLETS 750 MG: 750 TABLET, COATED ORAL at 09:13

## 2023-03-18 RX ADMIN — ONDANSETRON 4 MG: 4 TABLET, ORALLY DISINTEGRATING ORAL at 06:32

## 2023-03-18 RX ADMIN — ACETAMINOPHEN 650 MG: 325 TABLET ORAL at 02:40

## 2023-03-18 RX ADMIN — SENNOSIDES AND DOCUSATE SODIUM 1 TABLET: 8.6; 5 TABLET ORAL at 09:13

## 2023-03-18 RX ADMIN — OXYCODONE 5 MG: 5 TABLET ORAL at 00:14

## 2023-03-18 RX ADMIN — OXYCODONE 5 MG: 5 TABLET ORAL at 11:00

## 2023-03-18 RX ADMIN — SODIUM CHLORIDE, PRESERVATIVE FREE 10 ML: 5 INJECTION INTRAVENOUS at 09:15

## 2023-03-18 RX ADMIN — ACETAMINOPHEN 650 MG: 325 TABLET ORAL at 09:13

## 2023-03-18 ASSESSMENT — PAIN SCALES - GENERAL
PAINLEVEL_OUTOF10: 10
PAINLEVEL_OUTOF10: 7
PAINLEVEL_OUTOF10: 10

## 2023-03-18 ASSESSMENT — PAIN DESCRIPTION - LOCATION
LOCATION: ABDOMEN

## 2023-03-18 ASSESSMENT — PAIN DESCRIPTION - DESCRIPTORS
DESCRIPTORS: ACHING;SHARP
DESCRIPTORS: ACHING;THROBBING;SHARP
DESCRIPTORS: ACHING;SORE

## 2023-03-18 ASSESSMENT — PAIN DESCRIPTION - ORIENTATION
ORIENTATION: ANTERIOR
ORIENTATION: RIGHT;LEFT;MID
ORIENTATION: ANTERIOR

## 2023-03-18 ASSESSMENT — PAIN DESCRIPTION - PAIN TYPE: TYPE: SURGICAL PAIN

## 2023-03-18 ASSESSMENT — PAIN DESCRIPTION - ONSET: ONSET: GRADUAL

## 2023-03-18 ASSESSMENT — PAIN DESCRIPTION - FREQUENCY: FREQUENCY: INTERMITTENT

## 2023-03-18 ASSESSMENT — PAIN - FUNCTIONAL ASSESSMENT: PAIN_FUNCTIONAL_ASSESSMENT: PREVENTS OR INTERFERES SOME ACTIVE ACTIVITIES AND ADLS

## 2023-03-18 NOTE — PROGRESS NOTES
Admit Date: 3/17/2023    POD 1 Day Post-Op    Procedure:  Procedure(s): HERNIA VENTRAL REPAIR LAPAROSCOPIC ROBOTIC CONVERTED TO OPEN INCISIONAL HERNIA REPAIR WITH MESH    Subjective:     Patient awake in bed eating breakfast. Pain currently controlled with Medication. Reports some nausea overnight; now resolved. Voiding without difficulty. AF, VSS. Objective:       Vitals:    23 1949 23 2352 23 0403 23 0720   BP: (!) 149/85 (!) 164/84 (!) 147/84 (!) 140/80   Pulse: 53 55 56 50   Resp: 21 16 16 18   Temp: 97.7 °F (36.5 °C) 98.2 °F (36.8 °C) 98.1 °F (36.7 °C) 97.8 °F (36.6 °C)   TempSrc: Axillary Axillary Axillary Oral   SpO2: 100% 100% 98% 95%   Weight:       Height:           Temp (24hrs), Av.8 °F (36.6 °C), Min:97.3 °F (36.3 °C), Max:98.2 °F (36.8 °C)  . I&O reviewed as documented. [unfilled]     Physical Exam:   Constitutional: Alert, oriented, cooperative patient in no acute distress; appears stated age BP (!) 140/80   Pulse 50   Temp 97.8 °F (36.6 °C) (Oral)   Resp 18   Ht 5' 9\" (1.753 m)   Wt 142 lb 4 oz (64.5 kg)   SpO2 95%   BMI 21.01 kg/m²   Eyes:Sclera are clear. EOMs intact  ENMT: no external lesions' gross hearing normal; no obvious neck masses, no ear or lip lesions, nares normal  CV: RRR. Normal perfusion  Resp: No JVD. Breathing is  non-labored; no audible wheezing. 3L O2 via NC  GI: soft and non-distended, appropriately ttp, Zebedee Orleans in place    Musculoskeletal: unremarkable with normal function. No embolic signs or cyanosis. Neuro:  Oriented; moves all 4; no focal deficits  Psychiatric: normal affect and mood, no memory impairment     Labs: No results found for this or any previous visit (from the past 24 hour(s)).     All Data Reviewed    XR Results (most recent):  @BSHSILASTIMGCAT(WNE4226:1)@   US Results (most recent):  @BSHSGENEVASTPAMELLAGCAT(AQO3461:1)@   Assessment:     Principal Problem:    Ventral hernia  Active Problems:    S/P hernia repair  Resolved Problems:    * No resolved hospital problems.  *        Plan/Recommendations/Medical Decision Making:     Regular diet  Pain/ nausea control  Oob/ ambulate  Abd binder when oob  Wean O2  DC home today  F/u in office with Dr. Nayeli Barahona, ZEHRAP-BC

## 2023-03-18 NOTE — PROGRESS NOTES
END OF SHIFT NOTE:    INTAKE/OUTPUT  03/17 0701 - 03/18 0700  In: 750 [I.V.:750]  Out: 560 [Urine:550]  Voiding: Yes  Catheter: No  Drain:  na            Flatus: Patient does not have flatus present. Stool: 0 occurrences. Characteristics:           Stool Assessment  Last BM (including prior to admit): 03/16/23 (per patient)    Emesis: 0 occurrences. Characteristics:        VITAL SIGNS  Patient Vitals for the past 12 hrs:   Temp Pulse Resp BP SpO2   03/18/23 0403 98.1 °F (36.7 °C) 56 16 (!) 147/84 98 %   03/17/23 2352 98.2 °F (36.8 °C) 55 16 (!) 164/84 100 %   03/17/23 1949 97.7 °F (36.5 °C) 53 21 (!) 149/85 100 %       Pain Assessment  Pain Level: 10 (03/18/23 0014)  Pain Location: Abdomen  Patient's Stated Pain Goal: 3    Ambulating  No    Shift report given to oncoming nurse at the bedside.     Gasper Siegel RN

## 2023-03-18 NOTE — CARE COORDINATION
Pt is for discharge home today with family and no needs/supportive care orders recieved for CM at this time. Pt has a PCP identified for follow up care and [place] Medicaid for pharmacy benefits. 03/18/23 1229   Service Assessment   Patient Orientation Alert and Oriented   Cognition Alert   History Provided By Medical Record   Primary Caregiver Self   Support Systems Family Members   Patient's Healthcare Decision Maker is: Named in [address] [place] [address]   PCP Verified by CM Yes   Last Visit to PCP Within last 3 months   Prior Functional Level Independent in ADLs/IADLs   Current Functional Level Independent in ADLs/IADLs   Can patient return to prior living arrangement Yes   Ability to make needs known: Good   Family able to assist with home care needs: Yes   Would you like for me to discuss the discharge plan with any other family members/significant others, and if so, who? No   Financial Resources Medicaid   Social/Functional History   Lives With Family   Type of Home Apartment   Discharge Planning   Type of Residence Apartment   Living Arrangements Family Members   Current Services Prior To Admission None   Potential Assistance Purchasing Medications No   Type of Home Care Services None   Patient expects to be discharged to: [facility] Discharge   Transition of Care Consult (CM Consult) Discharge [facility] Discharge None   [facility] Resource Information Provided? No   Mode of Transport at Discharge Other (see comment)  (family)   Confirm Follow Up Transport Family   Condition of Participation: Discharge Planning   The Plan for Transition of Care is related to the following treatment goals: Pt will return home with supportive family at his functional baseline. The Patient and/Or Patient Representative agree with the Discharge Plan?  Yes

## 2023-03-18 NOTE — PROGRESS NOTES
Reviewed notes for new spiritual concerns      Will continue to assess how we can best serve this family        [place].       Per notes:       [address]

## 2023-03-18 NOTE — PROGRESS NOTES
Discharge instructions reviewed with patient. Mid abd incision with steri strips, slight amount of drainage noted on a pinpoint distal end. Incision covered with 4/4 and tape, abdominal binder replaced. Instructed patient to change dressing as needed and remove once oozing stops, patient and grandaughter verbalizes understanding. Waiting for family to .

## 2023-03-24 ENCOUNTER — TELEPHONE (OUTPATIENT)
Dept: INTERNAL MEDICINE CLINIC | Facility: CLINIC | Age: 68
End: 2023-03-24

## 2023-03-24 NOTE — TELEPHONE ENCOUNTER
Called patient's emergency contact regarding patients appt today. EC stated that patient was laying down resting and he was still sore from surgery. EC stated she would like to reschedule the appt to 04/17/2023. Pt has 2 week follow up with surgeon, so that appt will take place before 04/17/2023 appt with Dr. Lisa Colon. EC stated patients stomach looked very good and ask me to inform Dr. Lisa Colon.

## 2023-03-25 ENCOUNTER — HOSPITAL ENCOUNTER (INPATIENT)
Age: 68
LOS: 6 days | Discharge: HOME | End: 2023-04-01
Attending: EMERGENCY MEDICINE | Admitting: SURGERY
Payer: MEDICAID

## 2023-03-25 ENCOUNTER — APPOINTMENT (OUTPATIENT)
Dept: CT IMAGING | Age: 68
End: 2023-03-25
Payer: MEDICAID

## 2023-03-25 DIAGNOSIS — K91.30 SMALL BOWEL OBSTRUCTION DUE TO POSTOPERATIVE ADHESIONS: Primary | ICD-10-CM

## 2023-03-25 DIAGNOSIS — K56.609 SBO (SMALL BOWEL OBSTRUCTION) (HCC): ICD-10-CM

## 2023-03-25 LAB
ALBUMIN SERPL-MCNC: 3.7 G/DL (ref 3.2–4.6)
ALBUMIN/GLOB SERPL: 0.7 (ref 0.4–1.6)
ALP SERPL-CCNC: 82 U/L (ref 50–136)
ALT SERPL-CCNC: 13 U/L (ref 12–65)
ANION GAP SERPL CALC-SCNC: 5 MMOL/L (ref 2–11)
AST SERPL-CCNC: 11 U/L (ref 15–37)
BASOPHILS # BLD: 0 K/UL (ref 0–0.2)
BASOPHILS NFR BLD: 0 % (ref 0–2)
BILIRUB SERPL-MCNC: 0.7 MG/DL (ref 0.2–1.1)
BUN SERPL-MCNC: 38 MG/DL (ref 8–23)
CALCIUM SERPL-MCNC: 10.3 MG/DL (ref 8.3–10.4)
CHLORIDE SERPL-SCNC: 101 MMOL/L (ref 101–110)
CO2 SERPL-SCNC: 30 MMOL/L (ref 21–32)
CREAT SERPL-MCNC: 1.8 MG/DL (ref 0.8–1.5)
DIFFERENTIAL METHOD BLD: ABNORMAL
EOSINOPHIL # BLD: 0 K/UL (ref 0–0.8)
EOSINOPHIL NFR BLD: 0 % (ref 0.5–7.8)
ERYTHROCYTE [DISTWIDTH] IN BLOOD BY AUTOMATED COUNT: 12.4 % (ref 11.9–14.6)
GLOBULIN SER CALC-MCNC: 5.1 G/DL (ref 2.8–4.5)
GLUCOSE SERPL-MCNC: 156 MG/DL (ref 65–100)
HCT VFR BLD AUTO: 47 % (ref 41.1–50.3)
HGB BLD-MCNC: 15.5 G/DL (ref 13.6–17.2)
IMM GRANULOCYTES # BLD AUTO: 0 K/UL (ref 0–0.5)
IMM GRANULOCYTES NFR BLD AUTO: 0 % (ref 0–5)
LACTATE SERPL-SCNC: 2 MMOL/L (ref 0.4–2)
LIPASE SERPL-CCNC: 27 U/L (ref 73–393)
LYMPHOCYTES # BLD: 1.4 K/UL (ref 0.5–4.6)
LYMPHOCYTES NFR BLD: 20 % (ref 13–44)
MCH RBC QN AUTO: 29 PG (ref 26.1–32.9)
MCHC RBC AUTO-ENTMCNC: 33 G/DL (ref 31.4–35)
MCV RBC AUTO: 87.9 FL (ref 82–102)
MONOCYTES # BLD: 0.5 K/UL (ref 0.1–1.3)
MONOCYTES NFR BLD: 7 % (ref 4–12)
NEUTS SEG # BLD: 5 K/UL (ref 1.7–8.2)
NEUTS SEG NFR BLD: 73 % (ref 43–78)
NRBC # BLD: 0 K/UL (ref 0–0.2)
PLATELET # BLD AUTO: 419 K/UL (ref 150–450)
PMV BLD AUTO: 8.6 FL (ref 9.4–12.3)
POTASSIUM SERPL-SCNC: 4 MMOL/L (ref 3.5–5.1)
PROT SERPL-MCNC: 8.8 G/DL (ref 6.3–8.2)
RBC # BLD AUTO: 5.35 M/UL (ref 4.23–5.6)
SODIUM SERPL-SCNC: 136 MMOL/L (ref 133–143)
WBC # BLD AUTO: 6.9 K/UL (ref 4.3–11.1)

## 2023-03-25 PROCEDURE — 83605 ASSAY OF LACTIC ACID: CPT

## 2023-03-25 PROCEDURE — 74177 CT ABD & PELVIS W/CONTRAST: CPT

## 2023-03-25 PROCEDURE — 96374 THER/PROPH/DIAG INJ IV PUSH: CPT | Performed by: EMERGENCY MEDICINE

## 2023-03-25 PROCEDURE — 99285 EMERGENCY DEPT VISIT HI MDM: CPT | Performed by: EMERGENCY MEDICINE

## 2023-03-25 PROCEDURE — 85025 COMPLETE CBC W/AUTO DIFF WBC: CPT

## 2023-03-25 PROCEDURE — 83690 ASSAY OF LIPASE: CPT

## 2023-03-25 PROCEDURE — 80053 COMPREHEN METABOLIC PANEL: CPT

## 2023-03-25 PROCEDURE — 96375 TX/PRO/DX INJ NEW DRUG ADDON: CPT | Performed by: EMERGENCY MEDICINE

## 2023-03-25 PROCEDURE — 6360000002 HC RX W HCPCS: Performed by: EMERGENCY MEDICINE

## 2023-03-25 PROCEDURE — 2580000003 HC RX 258: Performed by: EMERGENCY MEDICINE

## 2023-03-25 PROCEDURE — 6360000004 HC RX CONTRAST MEDICATION: Performed by: EMERGENCY MEDICINE

## 2023-03-25 RX ORDER — MORPHINE SULFATE 4 MG/ML
4 INJECTION INTRAVENOUS ONCE
Status: COMPLETED | OUTPATIENT
Start: 2023-03-25 | End: 2023-03-25

## 2023-03-25 RX ORDER — ONDANSETRON 2 MG/ML
4 INJECTION INTRAMUSCULAR; INTRAVENOUS
Status: COMPLETED | OUTPATIENT
Start: 2023-03-25 | End: 2023-03-25

## 2023-03-25 RX ORDER — 0.9 % SODIUM CHLORIDE 0.9 %
1000 INTRAVENOUS SOLUTION INTRAVENOUS ONCE
Status: COMPLETED | OUTPATIENT
Start: 2023-03-25 | End: 2023-03-25

## 2023-03-25 RX ADMIN — ONDANSETRON 4 MG: 2 INJECTION INTRAMUSCULAR; INTRAVENOUS at 22:51

## 2023-03-25 RX ADMIN — MORPHINE SULFATE 4 MG: 4 INJECTION, SOLUTION INTRAMUSCULAR; INTRAVENOUS at 22:51

## 2023-03-25 RX ADMIN — SODIUM CHLORIDE 1000 ML: 9 INJECTION, SOLUTION INTRAVENOUS at 22:50

## 2023-03-25 RX ADMIN — DIATRIZOATE MEGLUMINE AND DIATRIZOATE SODIUM 15 ML: 660; 100 LIQUID ORAL; RECTAL at 22:52

## 2023-03-25 ASSESSMENT — PAIN DESCRIPTION - ORIENTATION: ORIENTATION: MID

## 2023-03-25 ASSESSMENT — LIFESTYLE VARIABLES
HOW OFTEN DO YOU HAVE A DRINK CONTAINING ALCOHOL: NEVER
HOW MANY STANDARD DRINKS CONTAINING ALCOHOL DO YOU HAVE ON A TYPICAL DAY: PATIENT DOES NOT DRINK

## 2023-03-25 ASSESSMENT — PAIN - FUNCTIONAL ASSESSMENT: PAIN_FUNCTIONAL_ASSESSMENT: 0-10

## 2023-03-25 ASSESSMENT — PAIN DESCRIPTION - DESCRIPTORS: DESCRIPTORS: SHARP;ACHING

## 2023-03-25 ASSESSMENT — PAIN DESCRIPTION - LOCATION: LOCATION: ABDOMEN

## 2023-03-26 ENCOUNTER — APPOINTMENT (OUTPATIENT)
Dept: GENERAL RADIOLOGY | Age: 68
End: 2023-03-26
Payer: MEDICAID

## 2023-03-26 LAB
ANION GAP SERPL CALC-SCNC: 2 MMOL/L (ref 2–11)
BASOPHILS # BLD: 0.1 K/UL (ref 0–0.2)
BASOPHILS NFR BLD: 1 % (ref 0–2)
BILIRUB UR QL: NEGATIVE
BUN SERPL-MCNC: 39 MG/DL (ref 8–23)
CALCIUM SERPL-MCNC: 8.8 MG/DL (ref 8.3–10.4)
CHLORIDE SERPL-SCNC: 108 MMOL/L (ref 101–110)
CHLORIDE UR-SCNC: 28 MMOL/L
CO2 SERPL-SCNC: 28 MMOL/L (ref 21–32)
CREAT SERPL-MCNC: 1.5 MG/DL (ref 0.8–1.5)
CREAT UR-MCNC: 278 MG/DL
DIFFERENTIAL METHOD BLD: ABNORMAL
EOSINOPHIL # BLD: 0 K/UL (ref 0–0.8)
EOSINOPHIL NFR BLD: 0 % (ref 0.5–7.8)
ERYTHROCYTE [DISTWIDTH] IN BLOOD BY AUTOMATED COUNT: 12.5 % (ref 11.9–14.6)
GLUCOSE SERPL-MCNC: 145 MG/DL (ref 65–100)
GLUCOSE UR QL STRIP.AUTO: NEGATIVE MG/DL
HCT VFR BLD AUTO: 42.7 % (ref 41.1–50.3)
HGB BLD-MCNC: 14.2 G/DL (ref 13.6–17.2)
IMM GRANULOCYTES # BLD AUTO: 0 K/UL (ref 0–0.5)
IMM GRANULOCYTES NFR BLD AUTO: 0 % (ref 0–5)
KETONES UR-MCNC: NEGATIVE MG/DL
LEUKOCYTE ESTERASE UR QL STRIP: NEGATIVE
LYMPHOCYTES # BLD: 0.9 K/UL (ref 0.5–4.6)
LYMPHOCYTES NFR BLD: 16 % (ref 13–44)
MCH RBC QN AUTO: 29.3 PG (ref 26.1–32.9)
MCHC RBC AUTO-ENTMCNC: 33.3 G/DL (ref 31.4–35)
MCV RBC AUTO: 88 FL (ref 82–102)
MONOCYTES # BLD: 0.6 K/UL (ref 0.1–1.3)
MONOCYTES NFR BLD: 10 % (ref 4–12)
NEUTS SEG # BLD: 4 K/UL (ref 1.7–8.2)
NEUTS SEG NFR BLD: 73 % (ref 43–78)
NITRITE UR QL: NEGATIVE
NRBC # BLD: 0 K/UL (ref 0–0.2)
OSMOLALITY UR: 1088 MOSM/KG H2O (ref 50–1400)
PH UR: 6 (ref 5–9)
PHOSPHATE SERPL-MCNC: 3.9 MG/DL (ref 2.3–3.7)
PLATELET # BLD AUTO: 393 K/UL (ref 150–450)
PLATELET COMMENT: ADEQUATE
PMV BLD AUTO: 8.6 FL (ref 9.4–12.3)
POTASSIUM SERPL-SCNC: 4.2 MMOL/L (ref 3.5–5.1)
PROT UR QL: 30 MG/DL
RBC # BLD AUTO: 4.85 M/UL (ref 4.23–5.6)
RBC # UR STRIP: NEGATIVE
RBC MORPH BLD: ABNORMAL
SERVICE CMNT-IMP: ABNORMAL
SODIUM SERPL-SCNC: 138 MMOL/L (ref 133–143)
SODIUM UR-SCNC: 20 MMOL/L
SP GR UR: 1.01 (ref 1–1.02)
UROBILINOGEN UR QL: 1 EU/DL (ref 0.2–1)
WBC # BLD AUTO: 5.6 K/UL (ref 4.3–11.1)
WBC MORPH BLD: ABNORMAL

## 2023-03-26 PROCEDURE — 82570 ASSAY OF URINE CREATININE: CPT

## 2023-03-26 PROCEDURE — 2580000003 HC RX 258: Performed by: NURSE PRACTITIONER

## 2023-03-26 PROCEDURE — 6360000002 HC RX W HCPCS: Performed by: NURSE PRACTITIONER

## 2023-03-26 PROCEDURE — 83935 ASSAY OF URINE OSMOLALITY: CPT

## 2023-03-26 PROCEDURE — 2500000003 HC RX 250 WO HCPCS: Performed by: NURSE PRACTITIONER

## 2023-03-26 PROCEDURE — 80048 BASIC METABOLIC PNL TOTAL CA: CPT

## 2023-03-26 PROCEDURE — 1100000000 HC RM PRIVATE

## 2023-03-26 PROCEDURE — 6360000002 HC RX W HCPCS: Performed by: STUDENT IN AN ORGANIZED HEALTH CARE EDUCATION/TRAINING PROGRAM

## 2023-03-26 PROCEDURE — 82436 ASSAY OF URINE CHLORIDE: CPT

## 2023-03-26 PROCEDURE — A4216 STERILE WATER/SALINE, 10 ML: HCPCS | Performed by: NURSE PRACTITIONER

## 2023-03-26 PROCEDURE — 36415 COLL VENOUS BLD VENIPUNCTURE: CPT

## 2023-03-26 PROCEDURE — 74018 RADEX ABDOMEN 1 VIEW: CPT

## 2023-03-26 PROCEDURE — 2580000003 HC RX 258: Performed by: STUDENT IN AN ORGANIZED HEALTH CARE EDUCATION/TRAINING PROGRAM

## 2023-03-26 PROCEDURE — 84100 ASSAY OF PHOSPHORUS: CPT

## 2023-03-26 PROCEDURE — 2500000003 HC RX 250 WO HCPCS: Performed by: STUDENT IN AN ORGANIZED HEALTH CARE EDUCATION/TRAINING PROGRAM

## 2023-03-26 PROCEDURE — 84300 ASSAY OF URINE SODIUM: CPT

## 2023-03-26 PROCEDURE — 6360000004 HC RX CONTRAST MEDICATION: Performed by: EMERGENCY MEDICINE

## 2023-03-26 PROCEDURE — 85025 COMPLETE CBC W/AUTO DIFF WBC: CPT

## 2023-03-26 PROCEDURE — 81003 URINALYSIS AUTO W/O SCOPE: CPT

## 2023-03-26 RX ORDER — MORPHINE SULFATE 2 MG/ML
2 INJECTION, SOLUTION INTRAMUSCULAR; INTRAVENOUS EVERY 4 HOURS PRN
Status: DISCONTINUED | OUTPATIENT
Start: 2023-03-26 | End: 2023-04-01 | Stop reason: HOSPADM

## 2023-03-26 RX ORDER — ACETAMINOPHEN 650 MG/1
650 SUPPOSITORY RECTAL EVERY 6 HOURS PRN
Status: DISCONTINUED | OUTPATIENT
Start: 2023-03-26 | End: 2023-04-01 | Stop reason: HOSPADM

## 2023-03-26 RX ORDER — MORPHINE SULFATE 4 MG/ML
4 INJECTION INTRAVENOUS EVERY 4 HOURS PRN
Status: DISCONTINUED | OUTPATIENT
Start: 2023-03-26 | End: 2023-03-27

## 2023-03-26 RX ORDER — SODIUM CHLORIDE 9 MG/ML
INJECTION, SOLUTION INTRAVENOUS PRN
Status: DISCONTINUED | OUTPATIENT
Start: 2023-03-26 | End: 2023-04-01 | Stop reason: HOSPADM

## 2023-03-26 RX ORDER — ACETAMINOPHEN 325 MG/1
650 TABLET ORAL EVERY 6 HOURS PRN
Status: DISCONTINUED | OUTPATIENT
Start: 2023-03-26 | End: 2023-04-01 | Stop reason: HOSPADM

## 2023-03-26 RX ORDER — SODIUM CHLORIDE 9 MG/ML
INJECTION, SOLUTION INTRAVENOUS CONTINUOUS
Status: DISCONTINUED | OUTPATIENT
Start: 2023-03-26 | End: 2023-03-26

## 2023-03-26 RX ORDER — METOPROLOL TARTRATE 5 MG/5ML
2.5 INJECTION INTRAVENOUS EVERY 8 HOURS
Status: DISCONTINUED | OUTPATIENT
Start: 2023-03-26 | End: 2023-03-30

## 2023-03-26 RX ORDER — HYDRALAZINE HYDROCHLORIDE 20 MG/ML
10 INJECTION INTRAMUSCULAR; INTRAVENOUS EVERY 6 HOURS PRN
Status: DISCONTINUED | OUTPATIENT
Start: 2023-03-26 | End: 2023-04-01 | Stop reason: HOSPADM

## 2023-03-26 RX ORDER — ONDANSETRON 4 MG/1
4 TABLET, ORALLY DISINTEGRATING ORAL EVERY 8 HOURS PRN
Status: DISCONTINUED | OUTPATIENT
Start: 2023-03-26 | End: 2023-04-01 | Stop reason: HOSPADM

## 2023-03-26 RX ORDER — SODIUM CHLORIDE 0.9 % (FLUSH) 0.9 %
5-40 SYRINGE (ML) INJECTION EVERY 12 HOURS SCHEDULED
Status: DISCONTINUED | OUTPATIENT
Start: 2023-03-26 | End: 2023-04-01 | Stop reason: HOSPADM

## 2023-03-26 RX ORDER — SODIUM CHLORIDE 450 MG/100ML
INJECTION, SOLUTION INTRAVENOUS CONTINUOUS
Status: DISCONTINUED | OUTPATIENT
Start: 2023-03-26 | End: 2023-03-27

## 2023-03-26 RX ORDER — ONDANSETRON 2 MG/ML
4 INJECTION INTRAMUSCULAR; INTRAVENOUS EVERY 6 HOURS PRN
Status: DISCONTINUED | OUTPATIENT
Start: 2023-03-26 | End: 2023-04-01 | Stop reason: HOSPADM

## 2023-03-26 RX ORDER — SODIUM CHLORIDE 0.9 % (FLUSH) 0.9 %
5-40 SYRINGE (ML) INJECTION PRN
Status: DISCONTINUED | OUTPATIENT
Start: 2023-03-26 | End: 2023-04-01 | Stop reason: HOSPADM

## 2023-03-26 RX ORDER — ENOXAPARIN SODIUM 100 MG/ML
40 INJECTION SUBCUTANEOUS EVERY 24 HOURS
Status: DISCONTINUED | OUTPATIENT
Start: 2023-03-26 | End: 2023-04-01 | Stop reason: HOSPADM

## 2023-03-26 RX ORDER — ATORVASTATIN CALCIUM 40 MG/1
40 TABLET, FILM COATED ORAL DAILY
COMMUNITY

## 2023-03-26 RX ADMIN — SODIUM CHLORIDE: 9 INJECTION, SOLUTION INTRAVENOUS at 02:28

## 2023-03-26 RX ADMIN — ONDANSETRON 4 MG: 2 INJECTION INTRAMUSCULAR; INTRAVENOUS at 06:52

## 2023-03-26 RX ADMIN — MORPHINE SULFATE 4 MG: 4 INJECTION, SOLUTION INTRAMUSCULAR; INTRAVENOUS at 08:06

## 2023-03-26 RX ADMIN — SODIUM CHLORIDE: 450 INJECTION, SOLUTION INTRAVENOUS at 20:40

## 2023-03-26 RX ADMIN — FAMOTIDINE 20 MG: 10 INJECTION INTRAVENOUS at 08:05

## 2023-03-26 RX ADMIN — ENOXAPARIN SODIUM 40 MG: 100 INJECTION SUBCUTANEOUS at 20:36

## 2023-03-26 RX ADMIN — IOPAMIDOL 100 ML: 755 INJECTION, SOLUTION INTRAVENOUS at 00:03

## 2023-03-26 RX ADMIN — HYDRALAZINE HYDROCHLORIDE 10 MG: 20 INJECTION INTRAMUSCULAR; INTRAVENOUS at 12:08

## 2023-03-26 RX ADMIN — MORPHINE SULFATE 4 MG: 4 INJECTION, SOLUTION INTRAMUSCULAR; INTRAVENOUS at 19:27

## 2023-03-26 RX ADMIN — SODIUM CHLORIDE: 450 INJECTION, SOLUTION INTRAVENOUS at 12:55

## 2023-03-26 RX ADMIN — SODIUM CHLORIDE, PRESERVATIVE FREE 10 ML: 5 INJECTION INTRAVENOUS at 08:21

## 2023-03-26 RX ADMIN — MORPHINE SULFATE 4 MG: 4 INJECTION, SOLUTION INTRAMUSCULAR; INTRAVENOUS at 03:21

## 2023-03-26 RX ADMIN — SODIUM CHLORIDE, PRESERVATIVE FREE 10 ML: 5 INJECTION INTRAVENOUS at 20:37

## 2023-03-26 RX ADMIN — METOPROLOL TARTRATE 2.5 MG: 5 INJECTION, SOLUTION INTRAVENOUS at 20:36

## 2023-03-26 RX ADMIN — METOPROLOL TARTRATE 2.5 MG: 5 INJECTION, SOLUTION INTRAVENOUS at 13:05

## 2023-03-26 ASSESSMENT — PAIN DESCRIPTION - PAIN TYPE
TYPE: ACUTE PAIN
TYPE: SURGICAL PAIN

## 2023-03-26 ASSESSMENT — PAIN SCALES - GENERAL
PAINLEVEL_OUTOF10: 10
PAINLEVEL_OUTOF10: 10
PAINLEVEL_OUTOF10: 5
PAINLEVEL_OUTOF10: 10
PAINLEVEL_OUTOF10: 10

## 2023-03-26 ASSESSMENT — PAIN DESCRIPTION - LOCATION
LOCATION: ABDOMEN

## 2023-03-26 ASSESSMENT — PAIN DESCRIPTION - DESCRIPTORS
DESCRIPTORS: SHARP;SHOOTING
DESCRIPTORS: SHARP

## 2023-03-26 ASSESSMENT — PAIN SCALES - WONG BAKER
WONGBAKER_NUMERICALRESPONSE: 0
WONGBAKER_NUMERICALRESPONSE: 2

## 2023-03-26 ASSESSMENT — PAIN DESCRIPTION - ONSET: ONSET: ON-GOING

## 2023-03-26 ASSESSMENT — PAIN - FUNCTIONAL ASSESSMENT
PAIN_FUNCTIONAL_ASSESSMENT: PREVENTS OR INTERFERES SOME ACTIVE ACTIVITIES AND ADLS
PAIN_FUNCTIONAL_ASSESSMENT: PREVENTS OR INTERFERES SOME ACTIVE ACTIVITIES AND ADLS

## 2023-03-26 ASSESSMENT — PAIN DESCRIPTION - ORIENTATION
ORIENTATION: MID
ORIENTATION: ANTERIOR

## 2023-03-26 ASSESSMENT — PAIN DESCRIPTION - FREQUENCY
FREQUENCY: INTERMITTENT
FREQUENCY: CONTINUOUS

## 2023-03-26 NOTE — ED TRIAGE NOTES
Pt arrived via self with support person. Pt had hernia repair at [facility] recently and was discharged 03/18/2023. Pt presents with severe abdominal pain with nausea and vomiting that started this morning. Pt states he is unable to feel legs and unable to walk. No notable fevers at home. Prescribed percocet post surgery with no relief.

## 2023-03-26 NOTE — PLAN OF CARE
Problem: Discharge Planning  Goal: Discharge to home or other facility with appropriate resources  3/26/2023 1436 by Leobardo Viera RN  Outcome: Progressing  3/26/2023 0403 by Juliana Salazar RN  Outcome: Progressing  Flowsheets (Taken 3/26/2023 0301 by Isaiah Patel, RN)  Discharge to home or other facility with appropriate resources:   Identify barriers to discharge with patient and caregiver   Arrange for needed discharge resources and transportation as appropriate   Identify discharge learning needs (meds, wound care, etc)   Refer to discharge planning if patient needs post-hospital services based on physician order or complex needs related to functional status, cognitive ability or social support system     Problem: Pain  Goal: Verbalizes/displays adequate comfort level or baseline comfort level  3/26/2023 1436 by Leobardo Viera RN  Outcome: Progressing  3/26/2023 0403 by Juliana Salazar RN  Outcome: Progressing  Flowsheets (Taken 3/26/2023 0310 by Isaiah Patel, RN)  Verbalizes/displays adequate comfort level or baseline comfort level:   Encourage patient to monitor pain and request assistance   Assess pain using appropriate pain scale   Administer analgesics based on type and severity of pain and evaluate response     Problem: Safety - Adult  Goal: Free from fall injury  3/26/2023 1436 by Leobardo Viera RN  Outcome: Progressing  3/26/2023 0403 by Juliana Salazar RN  Outcome: Progressing  [address] (Taken 3/26/2023 0351 by Isaiah Patel, RN)  Free From Fall Injury: Instruct family/caregiver on patient safety     Problem: ABCDS Injury Assessment  Goal: Absence of physical injury  3/26/2023 1436 by Leobardo Viera RN  Outcome: Progressing  3/26/2023 0403 by Juliana Salazar RN  Outcome: Progressing  Flowsheets (Taken 3/26/2023 0351 by Isaiah Patel, RN)  Absence of Physical Injury: Implement safety measures based on patient assessment

## 2023-03-26 NOTE — ED PROVIDER NOTES
mesenteric ischemia)    CBC with Auto Differential    Comprehensive Metabolic Panel w/ Reflex to MG    Diet NPO    POCT Urine Dipstick    NG TUBE, INSERTION    Vital signs per unit routine    Up as tolerated    Up in chair    Intake and output    Place intermittent pneumatic compression device    NG TUBE, INSERTION    Nasogastric tube maintenance    Full code    Initiate Oxygen Therapy Protocol    POCT Urinalysis no Micro    Saline lock IV    ADMIT TO INPATIENT        Medications   0.9 % sodium chloride infusion ( IntraVENous New Bag 3/26/23 0228)   sodium chloride flush 0.9 % injection 5-40 mL (has no administration in time range)   sodium chloride flush 0.9 % injection 5-40 mL (has no administration in time range)   0.9 % sodium chloride infusion (has no administration in time range)   ondansetron (ZOFRAN-ODT) disintegrating tablet 4 mg (has no administration in time range)     Or   ondansetron (ZOFRAN) injection 4 mg (has no administration in time range)   famotidine (PEPCID) 20 mg in sodium chloride (PF) 0.9 % 10 mL injection (has no administration in time range)   acetaminophen (TYLENOL) tablet 650 mg (has no administration in time range)     Or   acetaminophen (TYLENOL) suppository 650 mg (has no administration in time range)   morphine injection 4 mg (4 mg IntraVENous Given 3/26/23 0321)   morphine injection 2 mg (has no administration in time range)   diatrizoate meglumine-sodium (GASTROGRAFIN) 66-10 % solution 15 mL (15 mLs Oral Given 3/25/23 2252)   morphine injection 4 mg (4 mg IntraVENous Given 3/25/23 2251)   ondansetron (ZOFRAN) injection 4 mg (4 mg IntraVENous Given 3/25/23 2251)   0.9 % sodium chloride bolus (0 mLs IntraVENous Stopped 3/25/23 2321)   iopamidol (ISOVUE-370) 76 % injection 100 mL (100 mLs IntraVENous Given 3/26/23 0003)       Current Discharge Medication List           Past Medical History:   Diagnosis Date    Other ill-defined conditions(659.29)         Past Surgical History: Wall: Previously identified large midline ventral hernia has been   repaired. Small seroma remains measuring 7.1 x 1.6 x 5.6 cm. Bladder: Normal.     Reproductive: Normal.     Musculoskeletal: Mild anterolisthesis of L4 in relation to L5 secondary to facet   arthropathy. Impression    1. High-grade small bowel obstruction with transition point in the right mid   abdomen likely secondary to adhesion. 2.  Interval repair of large ventral hernia with postoperative anterior   abdominal wall fluid collection that most likely represents a seroma.      Teresa Borrego M.D., [facility]   3/26/2023 12:32:00 AM   CBC with Diff   Result Value Ref Range    WBC 6.9 4.3 - 11.1 K/uL    RBC 5.35 4.23 - 5.6 M/uL    Hemoglobin 15.5 13.6 - 17.2 g/dL    Hematocrit 47.0 41.1 - 50.3 %    MCV 87.9 82 - 102 FL    MCH 29.0 26.1 - 32.9 PG    MCHC 33.0 31.4 - 35.0 g/dL    RDW 12.4 11.9 - 14.6 %    Platelets 467 438 - 616 K/uL    MPV 8.6 (L) 9.4 - 12.3 FL    nRBC 0.00 0.0 - 0.2 K/uL    Differential Type AUTOMATED      Seg Neutrophils 73 43 - 78 %    Lymphocytes 20 13 - 44 %    Monocytes 7 4.0 - 12.0 %    Eosinophils % 0 (L) 0.5 - 7.8 %    Basophils 0 0.0 - 2.0 %    Immature Granulocytes 0 0.0 - 5.0 %    Segs Absolute 5.0 1.7 - 8.2 K/UL    Absolute Lymph # 1.4 0.5 - 4.6 K/UL    Absolute Mono # 0.5 0.1 - 1.3 K/UL    Absolute Eos # 0.0 0.0 - 0.8 K/UL    Basophils Absolute 0.0 0.0 - 0.2 K/UL    Absolute Immature Granulocyte 0.0 0.0 - 0.5 K/UL   CMP   Result Value Ref Range    Sodium 136 133 - 143 mmol/L    Potassium 4.0 3.5 - 5.1 mmol/L    Chloride 101 101 - 110 mmol/L    CO2 30 21 - 32 mmol/L    Anion Gap 5 2 - 11 mmol/L    Glucose 156 (H) 65 - 100 mg/dL    BUN 38 (H) 8 - 23 MG/DL    Creatinine 1.80 (H) 0.8 - 1.5 MG/DL    Est, Glom Filt Rate 41 (L) >60 ml/min/1.73m2    Calcium 10.3 8.3 - 10.4 MG/DL    Total Bilirubin 0.7 0.2 - 1.1 MG/DL    ALT 13 12 - 65 U/L    AST 11 (L) 15 - 37 U/L    Alk Phosphatase 82 50 - 136 U/L    Total Protein 8.8

## 2023-03-26 NOTE — PROGRESS NOTES
TRANSFER - IN REPORT:    Verbal report received from [address] [place] [address], RN on Flaquito Luu  being received from ED for routine progression of patient care      Report consisted of patient's Situation, Background, Assessment and   Recommendations(SBAR). Information from the following report(s) Nurse Handoff Report was reviewed with the receiving nurse. Opportunity for questions and clarification was provided. Assessment completed upon patient's arrival to unit and care assumed.

## 2023-03-26 NOTE — ED NOTES
This nurse successfully placed 16fr NG tube and has 550ml of contents removed in to wall canister. Despite proper placement, pt states \" I cant take having this thing in my nose, please take it out\".  Md notifed and aware      Emery Mena RN  03/26/23 9411 Mult at length conversations with pt and sig other. Dw them re labas / xr, they refused at this time. Dw them re MRI, will fu with outpt ortho for MRI done on urgent basis.    Patient doing well, no acute complaints. Discussed with patient about the nature of the back pain and the importance of outpatient follow-up for continued workup, evaluation and definitive diagnosis.  Discussed back pain and neurologic precautions including numbness, tingling, weakness, fever, and changes in bowel/bladder.  An opportunity to ask questions was given . Understanding of all instructions and precautions was verbalized and the patient will follow-up as outpatient as soon as possible. Patient also understands the possibility of needing additional imaging, ie MRI as outpatient. Patient will return with any changes, concerns, or any worsening / persistent symptoms. Patient doing well, no acute complaints. Discussed with patient about the nature of the back pain and the importance of outpatient follow-up for continued workup, evaluation and definitive diagnosis.  Discussed back pain and neurologic precautions including numbness, tingling, weakness, fever, and changes in bowel/bladder.  An opportunity to ask questions was given . Understanding of all instructions and precautions was verbalized and the patient will follow-up as outpatient as soon as possible. Patient also understands the possibility of needing additional imaging, ie MRI as outpatient. Patient will return with any changes, concerns, or any worsening / persistent symptoms. Mult at length conversations with pt and sig other. Dw them re labs / xr, they refused at this time. Dw them re MRI, will fu with outpt ortho for MRI done on urgent basis.    Patient doing well, no acute complaints. Discussed with patient about the nature of the back pain and the importance of outpatient follow-up for continued workup, evaluation and definitive diagnosis.  Discussed back pain and neurologic precautions including numbness, tingling, weakness, fever, and changes in bowel/bladder.  An opportunity to ask questions was given . Understanding of all instructions and precautions was verbalized and the patient will follow-up as outpatient as soon as possible. Patient also understands the possibility of needing additional imaging, ie MRI as outpatient. Patient will return with any changes, concerns, or any worsening / persistent symptoms.

## 2023-03-26 NOTE — ED NOTES
Pt received room assignment in facility. Report called, given to accepting RN, Shaina King. Pt to be transported to floor by pt transport. Request submitted.       Diana Salgado RN  03/26/23 7197

## 2023-03-26 NOTE — CONSULTS
[facility] Hospitalist Consult   Admit Date:  3/25/2023 10:27 PM   Name:  Cb Calabrese   Age:  78 y.o. Sex:  male  :  1955   MRN:  [id number]   Room:      Presenting/Chief Complaint: Post-op Problem    Reason(s) for Admission: SBO (small bowel obstruction) ([facility] [address] [K56.609]  Small bowel obstruction due to postoperative adhesions [K91.30]     Hospitalists consulted by Vandana Valenzuela, * for: management of hypertension and SARI. History of Presenting Illness:   Cb Calabrese is a 78 y.o. male with history of  ventral hernia repaired on . He has been having some vomiting today and some lower abdominal discomfort. Hospitalist consulted for: management of hypertension and SARI. Assessment & Plan:   SBO (small bowel obstruction) (H[facility])  N.p.o.  NG tube was placed  Surgery is primary    SARI   Most likely due to  pre renal and volume loss through NG  Creatinine is 1.8  and baseline is 1.5  Continue IVF   Follow-up with urine electrolytes  Avoid Nephrotoxic drugs    Hypertension  Started on metoprolol 2.5 mg Q6hrs    Hyperlipidemia  Continue home medication statin      Diet:  Diet NPO  VTE prophylaxis: lovenox   Code status: Full Code    Principal Problem:    SBO (small bowel obstruction) ([facility] [address]  Resolved Problems:    * No resolved hospital problems.  *      Past History:  Past Medical History:   Diagnosis Date    Other ill-defined conditions(799.89)        Past Surgical History:   Procedure Laterality Date    HERNIA REPAIR N/A 3/17/2023    HERNIA VENTRAL REPAIR LAPAROSCOPIC ROBOTIC CONVERTED TO OPEN INCISIONAL HERNIA REPAIR WITH MESH performed by Vandana Valenzuela DO at [id number] [facility]  2006        Social History     Tobacco Use    Smoking status: Every Day     Packs/day: 0.25     Years: 30.00     Pack years: 7.50     Types: Cigarettes    Smokeless tobacco: Never    Tobacco comments:     Began smoking age 16   Substance Use Topics    Alcohol Bilirubin, Urine, POC Negative NEG      Blood, UA POC Negative NEG      URINE UROBILINOGEN POC 1.0 0.2 - 1.0 EU/dL    Nitrite, Urine, POC Negative NEG      Leukocyte Est, UA POC Negative NEG      Performed by: Lucila Guzman I have personally reviewed imaging studies showing:  XR ABDOMEN (KUB) (SINGLE AP VIEW)    Result Date: 3/26/2023  KUB Clinical indications: Evaluate NG tube FINDINGS: Single supine view of the upper abdomen shows the NG tube to be in satisfactory position the left upper quadrant. Bowel gas pattern is unremarkable. NG tube in satisfactory position. CT ABDOMEN PELVIS W IV CONTRAST Additional Contrast? Oral    Result Date: 3/26/2023  EXAMINATION: CT SCAN OF THE ABDOMEN AND PELVIS WITH INTRAVENOUS CONTRAST DATE OF EXAM: 3/26/2023 12:05 AM HISTORY: 1 week post ventral hernia repair. He is now having lower abdominal pain and vomiting COMPARISON: April 21, 2018. TECHNIQUE: CT examination of the abdomen and pelvis was performed following the intravenous administration of 100 mL  Isovue-370. CT dose lowering techniques were used, to include: automated exposure control, adjustment for patient size, and/or use of iterative reconstruction. FINDINGS: ABDOMEN/PELVIS: Lower Chest: Mild bibasilar atelectasis again identified. Liver: A few scattered hepatic hypodensities again identified likely small cysts. No concerning liver abnormality. Gallbladder/Biliary: Normal. Pancreas: Normal. Spleen: Normal. Adrenal Glands: Normal. Kidneys: Small cyst superior pole right kidney again identified. No hydronephrosis. GI Tract: Stomach is moderately dilated. Multiple loops of significantly distended small bowel identified measuring up to 4.3 cm. Abrupt transition in the right mid abdomen as seen on series 2 image 65. Some mesenteric swirling at transition point. Bowel distal to the transition is entirely collapsed. Normal bowel wall enhancement. Mesentery/Peritoneum: Normal. Vasculature:  Atherosclerotic

## 2023-03-26 NOTE — PROGRESS NOTES
END OF SHIFT NOTE:    INTAKE/OUTPUT  No intake/output data recorded. Voiding: Yes  Catheter: No  Drain:   NG/OG/NJ/NE Tube Nasogastric Right nostril (Active)   Surrounding Skin Clean, dry & intact 03/26/23 1830   Securement device Tape 03/26/23 1830   Status Suction-low intermittent 03/26/23 1830   Placement Verified X-Ray (Initial) 03/26/23 1830   NG/OG/NJ/NE External Measurement (cm) 55 cm 03/26/23 1830   Drainage Appearance Brown 03/26/23 1830   Output (mL) 300 ml 03/26/23 1830               Flatus: Patient does not have flatus present. Stool: 0 occurrences. Characteristics:           Stool Assessment  Last BM (including prior to admit): 03/25/23 (per patient)    Emesis: 0 occurrences. Characteristics:        VITAL SIGNS  Patient Vitals for the past 12 hrs:   Temp Pulse Resp BP SpO2   03/26/23 1526 99.1 °F (37.3 °C) 100 16 (!) 140/89 91 %   03/26/23 1238 -- -- -- -- 90 %   03/26/23 1148 99 °F (37.2 °C) (!) 107 18 (!) 147/99 (!) 89 %   03/26/23 0720 99.1 °F (37.3 °C) (!) 114 20 (!) 155/110 91 %       Pain Assessment  Pain Level: 10 (03/26/23 0806)  Pain Location: Abdomen  Patient's Stated Pain Goal: 2    Ambulating  No    Shift report given to oncoming nurse at the bedside.     Claudette Hoehn, RN

## 2023-03-26 NOTE — H&P
Problems Father      ROS: The patient has no difficulty with chest pain or shortness of breath. No fever or chills. Comprehensive review of systems was otherwise unremarkable except as noted above. Physical Exam:   BP (!) 155/110 Comment: Primary RN notified  Pulse (!) 114 Comment: Primary RN notified  Temp 99.1 °F (37.3 °C) (Oral)   Resp 20   Ht 5' 9\" (1.753 m)   Wt 150 lb (68 kg)   SpO2 91%   BMI 22.15 kg/m²   Vitals:    03/26/23 0215 03/26/23 0310 03/26/23 0321 03/26/23 0720   BP:  (!) 147/104  (!) 155/110   Pulse:  (!) 108  (!) 114   Resp:  18 18 20   Temp:  98.6 °F (37 °C)  99.1 °F (37.3 °C)   TempSrc:  Oral  Oral   SpO2: 94% 95%  91%   Weight:       Height:         03/26 0701 - 03/26 1900  In: -   Out: 125 [Urine:125]  No intake/output data recorded. Constitutional: Alert, cooperative,  NAD, HTN   Eyes: Sclera are clear. EOMs intact  ENMT: no external lesions gross hearing normal; no obvious neck masses, no ear or lip lesions, nares normal, NG to LIS  CV: Tachy. Normal perfusion  Resp: No JVD. Breathing is  non-labored; no audible wheezing. GI: soft and slightly-distended, healing midline incision, Absent BS     Musculoskeletal: unremarkable with normal function. No embolic signs or cyanosis.    Neuro: moves all 4; no focal deficits  Psychiatric: normal affect and mood    Recent vitals (if inpt):  Patient Vitals for the past 24 hrs:   BP Temp Temp src Pulse Resp SpO2 Height Weight   03/26/23 0720 (!) 155/110 99.1 °F (37.3 °C) Oral (!) 114 20 91 % -- --   03/26/23 0321 -- -- -- -- 18 -- -- --   03/26/23 0310 (!) 147/104 98.6 °F (37 °C) Oral (!) 108 18 95 % -- --   03/26/23 0215 -- -- -- -- -- 94 % -- --   03/26/23 0200 -- -- -- -- -- 97 % -- --   03/26/23 0145 -- -- -- -- -- 97 % -- --   03/26/23 0130 -- -- -- -- -- 94 % -- --   03/26/23 0127 (!) 146/103 -- -- -- -- 97 % -- --   03/26/23 0115 (!) 142/96 -- -- -- -- -- -- --   03/26/23 0100 (!) 159/109 -- -- -- -- 92 % -- --   03/26/23 0045 (!) ventral hernia has been  repaired. Small seroma remains measuring 7.1 x 1.6 x 5.6 cm. Bladder: Normal.    Reproductive: Normal.    Musculoskeletal: Mild anterolisthesis of L4 in relation to L5 secondary to facet  arthropathy. Impression  1. High-grade small bowel obstruction with transition point in the right mid  abdomen likely secondary to adhesion. 2.  Interval repair of large ventral hernia with postoperative anterior  abdominal wall fluid collection that most likely represents a seroma. Blanquita Lacy M.D., [facility]  3/26/2023 12:32:00 AM    US Result (most recent):  No results found for this or any previous visit from the past 3650 days. Admission date (for inpatients): 3/25/2023   * No surgery found *  * No surgery found *        ASSESSMENT/PLAN:  [unfilled]  Principal Problem:    SBO (small bowel obstruction) ([facility])  Resolved Problems:    * No resolved hospital problems.  *     Patient Active Problem List    Diagnosis Date Noted    Coronary artery disease involving native coronary artery of native heart without angina pectoris 03/14/2023     Priority: High    S/P hernia repair 03/17/2023     Priority: Medium    Need for hepatitis C screening test 02/24/2023     Priority: Medium    Anterior dislocation of left shoulder 02/24/2023     Priority: Medium    Lip mass 02/24/2023     Priority: Medium    Encounter for perioperative consultation 02/24/2023     Priority: Medium    SBO (small bowel obstruction) ([address] 03/26/2023    Ventral hernia 04/20/2018    Tobacco abuse 04/20/2018    Near syncope 04/20/2018          Number and Complexity of Problems addressed and   Risks of complications and/or morbidity of management    IVF  NPO  NG to LIS  Pain/ nausea control  Follow labs  OOB/ chair  Pepcid  I&O  Hydralazine PRN BP          Signed: Enrique Lino FNP-BC

## 2023-03-27 ENCOUNTER — APPOINTMENT (OUTPATIENT)
Dept: GENERAL RADIOLOGY | Age: 68
End: 2023-03-27
Payer: MEDICAID

## 2023-03-27 LAB
ALBUMIN SERPL-MCNC: 2.8 G/DL (ref 3.2–4.6)
ALBUMIN/GLOB SERPL: 0.8 (ref 0.4–1.6)
ALP SERPL-CCNC: 56 U/L (ref 50–136)
ALT SERPL-CCNC: 8 U/L (ref 12–65)
ANION GAP SERPL CALC-SCNC: 2 MMOL/L (ref 2–11)
AST SERPL-CCNC: 10 U/L (ref 15–37)
BASOPHILS # BLD: 0.1 K/UL (ref 0–0.2)
BASOPHILS NFR BLD: 1 % (ref 0–2)
BILIRUB SERPL-MCNC: 0.6 MG/DL (ref 0.2–1.1)
BUN SERPL-MCNC: 41 MG/DL (ref 8–23)
CALCIUM SERPL-MCNC: 8.5 MG/DL (ref 8.3–10.4)
CHLORIDE SERPL-SCNC: 108 MMOL/L (ref 101–110)
CO2 SERPL-SCNC: 29 MMOL/L (ref 21–32)
CREAT SERPL-MCNC: 1.5 MG/DL (ref 0.8–1.5)
DIFFERENTIAL METHOD BLD: ABNORMAL
EOSINOPHIL # BLD: 0.1 K/UL (ref 0–0.8)
EOSINOPHIL NFR BLD: 1 % (ref 0.5–7.8)
ERYTHROCYTE [DISTWIDTH] IN BLOOD BY AUTOMATED COUNT: 12.4 % (ref 11.9–14.6)
GLOBULIN SER CALC-MCNC: 3.7 G/DL (ref 2.8–4.5)
GLUCOSE SERPL-MCNC: 119 MG/DL (ref 65–100)
HCT VFR BLD AUTO: 37.6 % (ref 41.1–50.3)
HGB BLD-MCNC: 12.3 G/DL (ref 13.6–17.2)
IMM GRANULOCYTES # BLD AUTO: 0 K/UL (ref 0–0.5)
IMM GRANULOCYTES NFR BLD AUTO: 0 % (ref 0–5)
LYMPHOCYTES # BLD: 2.2 K/UL (ref 0.5–4.6)
LYMPHOCYTES NFR BLD: 46 % (ref 13–44)
MCH RBC QN AUTO: 29.2 PG (ref 26.1–32.9)
MCHC RBC AUTO-ENTMCNC: 32.7 G/DL (ref 31.4–35)
MCV RBC AUTO: 89.3 FL (ref 82–102)
MONOCYTES # BLD: 0.7 K/UL (ref 0.1–1.3)
MONOCYTES NFR BLD: 15 % (ref 4–12)
NEUTS SEG # BLD: 1.8 K/UL (ref 1.7–8.2)
NEUTS SEG NFR BLD: 37 % (ref 43–78)
NRBC # BLD: 0 K/UL (ref 0–0.2)
PHOSPHATE SERPL-MCNC: 3.3 MG/DL (ref 2.3–3.7)
PLATELET # BLD AUTO: 372 K/UL (ref 150–450)
PLATELET COMMENT: ADEQUATE
PMV BLD AUTO: 9.2 FL (ref 9.4–12.3)
POTASSIUM SERPL-SCNC: 3.9 MMOL/L (ref 3.5–5.1)
PROT SERPL-MCNC: 6.5 G/DL (ref 6.3–8.2)
RBC # BLD AUTO: 4.21 M/UL (ref 4.23–5.6)
RBC MORPH BLD: ABNORMAL
SODIUM SERPL-SCNC: 139 MMOL/L (ref 133–143)
WBC # BLD AUTO: 4.9 K/UL (ref 4.3–11.1)
WBC MORPH BLD: ABNORMAL

## 2023-03-27 PROCEDURE — 2580000003 HC RX 258: Performed by: NURSE PRACTITIONER

## 2023-03-27 PROCEDURE — 6360000002 HC RX W HCPCS: Performed by: STUDENT IN AN ORGANIZED HEALTH CARE EDUCATION/TRAINING PROGRAM

## 2023-03-27 PROCEDURE — 6360000002 HC RX W HCPCS: Performed by: NURSE PRACTITIONER

## 2023-03-27 PROCEDURE — 6360000004 HC RX CONTRAST MEDICATION: Performed by: STUDENT IN AN ORGANIZED HEALTH CARE EDUCATION/TRAINING PROGRAM

## 2023-03-27 PROCEDURE — 2580000003 HC RX 258: Performed by: HOSPITALIST

## 2023-03-27 PROCEDURE — 74250 X-RAY XM SM INT 1CNTRST STD: CPT

## 2023-03-27 PROCEDURE — 85025 COMPLETE CBC W/AUTO DIFF WBC: CPT

## 2023-03-27 PROCEDURE — 74018 RADEX ABDOMEN 1 VIEW: CPT

## 2023-03-27 PROCEDURE — 6360000002 HC RX W HCPCS

## 2023-03-27 PROCEDURE — 2500000003 HC RX 250 WO HCPCS: Performed by: NURSE PRACTITIONER

## 2023-03-27 PROCEDURE — 2580000003 HC RX 258: Performed by: STUDENT IN AN ORGANIZED HEALTH CARE EDUCATION/TRAINING PROGRAM

## 2023-03-27 PROCEDURE — 2500000003 HC RX 250 WO HCPCS: Performed by: STUDENT IN AN ORGANIZED HEALTH CARE EDUCATION/TRAINING PROGRAM

## 2023-03-27 PROCEDURE — 1100000000 HC RM PRIVATE

## 2023-03-27 PROCEDURE — A4216 STERILE WATER/SALINE, 10 ML: HCPCS | Performed by: NURSE PRACTITIONER

## 2023-03-27 PROCEDURE — 80053 COMPREHEN METABOLIC PANEL: CPT

## 2023-03-27 PROCEDURE — 36415 COLL VENOUS BLD VENIPUNCTURE: CPT

## 2023-03-27 PROCEDURE — 84100 ASSAY OF PHOSPHORUS: CPT

## 2023-03-27 RX ORDER — HYDROMORPHONE HYDROCHLORIDE 1 MG/ML
0.5 INJECTION, SOLUTION INTRAMUSCULAR; INTRAVENOUS; SUBCUTANEOUS EVERY 4 HOURS PRN
Status: DISCONTINUED | OUTPATIENT
Start: 2023-03-27 | End: 2023-04-01 | Stop reason: HOSPADM

## 2023-03-27 RX ORDER — SODIUM CHLORIDE 9 MG/ML
INJECTION, SOLUTION INTRAVENOUS CONTINUOUS
Status: DISCONTINUED | OUTPATIENT
Start: 2023-03-27 | End: 2023-03-29

## 2023-03-27 RX ORDER — PROCHLORPERAZINE EDISYLATE 5 MG/ML
10 INJECTION INTRAMUSCULAR; INTRAVENOUS EVERY 6 HOURS PRN
Status: DISCONTINUED | OUTPATIENT
Start: 2023-03-27 | End: 2023-03-30

## 2023-03-27 RX ADMIN — CHLORPROMAZINE HYDROCHLORIDE 25 MG: 25 INJECTION INTRAMUSCULAR at 20:55

## 2023-03-27 RX ADMIN — HYDRALAZINE HYDROCHLORIDE 10 MG: 20 INJECTION INTRAMUSCULAR; INTRAVENOUS at 18:13

## 2023-03-27 RX ADMIN — ENOXAPARIN SODIUM 40 MG: 100 INJECTION SUBCUTANEOUS at 20:55

## 2023-03-27 RX ADMIN — HYDROMORPHONE HYDROCHLORIDE 0.5 MG: 1 INJECTION, SOLUTION INTRAMUSCULAR; INTRAVENOUS; SUBCUTANEOUS at 18:07

## 2023-03-27 RX ADMIN — FAMOTIDINE 20 MG: 10 INJECTION INTRAVENOUS at 08:22

## 2023-03-27 RX ADMIN — METOPROLOL TARTRATE 2.5 MG: 5 INJECTION, SOLUTION INTRAVENOUS at 05:03

## 2023-03-27 RX ADMIN — PROCHLORPERAZINE EDISYLATE 10 MG: 5 INJECTION INTRAMUSCULAR; INTRAVENOUS at 13:21

## 2023-03-27 RX ADMIN — MORPHINE SULFATE 2 MG: 2 INJECTION, SOLUTION INTRAMUSCULAR; INTRAVENOUS at 08:22

## 2023-03-27 RX ADMIN — METOPROLOL TARTRATE 2.5 MG: 5 INJECTION, SOLUTION INTRAVENOUS at 20:55

## 2023-03-27 RX ADMIN — METOPROLOL TARTRATE 2.5 MG: 5 INJECTION, SOLUTION INTRAVENOUS at 13:20

## 2023-03-27 RX ADMIN — MORPHINE SULFATE 2 MG: 2 INJECTION, SOLUTION INTRAMUSCULAR; INTRAVENOUS at 02:28

## 2023-03-27 RX ADMIN — MORPHINE SULFATE 4 MG: 4 INJECTION, SOLUTION INTRAMUSCULAR; INTRAVENOUS at 11:52

## 2023-03-27 RX ADMIN — SODIUM CHLORIDE, PRESERVATIVE FREE 10 ML: 5 INJECTION INTRAVENOUS at 20:55

## 2023-03-27 RX ADMIN — SODIUM CHLORIDE: 9 INJECTION, SOLUTION INTRAVENOUS at 15:42

## 2023-03-27 RX ADMIN — SODIUM CHLORIDE: 450 INJECTION, SOLUTION INTRAVENOUS at 05:19

## 2023-03-27 RX ADMIN — MORPHINE SULFATE 2 MG: 2 INJECTION, SOLUTION INTRAMUSCULAR; INTRAVENOUS at 15:38

## 2023-03-27 RX ADMIN — ONDANSETRON 4 MG: 2 INJECTION INTRAMUSCULAR; INTRAVENOUS at 16:27

## 2023-03-27 RX ADMIN — HYDROMORPHONE HYDROCHLORIDE 0.5 MG: 1 INJECTION, SOLUTION INTRAMUSCULAR; INTRAVENOUS; SUBCUTANEOUS at 13:13

## 2023-03-27 RX ADMIN — DIATRIZOATE MEGLUMINE AND DIATRIZOATE SODIUM 180 ML: 660; 100 LIQUID ORAL; RECTAL at 15:16

## 2023-03-27 RX ADMIN — MORPHINE SULFATE 2 MG: 2 INJECTION, SOLUTION INTRAMUSCULAR; INTRAVENOUS at 19:57

## 2023-03-27 ASSESSMENT — PAIN SCALES - GENERAL
PAINLEVEL_OUTOF10: 3
PAINLEVEL_OUTOF10: 10
PAINLEVEL_OUTOF10: 9
PAINLEVEL_OUTOF10: 10
PAINLEVEL_OUTOF10: 6
PAINLEVEL_OUTOF10: 6
PAINLEVEL_OUTOF10: 5
PAINLEVEL_OUTOF10: 9
PAINLEVEL_OUTOF10: 10

## 2023-03-27 ASSESSMENT — PAIN DESCRIPTION - LOCATION
LOCATION: ABDOMEN
LOCATION: ARM
LOCATION: ABDOMEN

## 2023-03-27 ASSESSMENT — PAIN DESCRIPTION - ORIENTATION
ORIENTATION: ANTERIOR

## 2023-03-27 ASSESSMENT — PAIN DESCRIPTION - DESCRIPTORS
DESCRIPTORS: STABBING
DESCRIPTORS: ACHING;SHOOTING
DESCRIPTORS: SHARP

## 2023-03-27 ASSESSMENT — PAIN SCALES - WONG BAKER: WONGBAKER_NUMERICALRESPONSE: 0

## 2023-03-27 ASSESSMENT — PAIN - FUNCTIONAL ASSESSMENT: PAIN_FUNCTIONAL_ASSESSMENT: PREVENTS OR INTERFERES SOME ACTIVE ACTIVITIES AND ADLS

## 2023-03-27 NOTE — PROGRESS NOTES
GENERAL SURGERY PROGRESS NOTE    Name:  Christina Dawkins Date/Time of Admission: 3/25/2023 10:27 PM  CSN: [id number]  Attending Provider: Kim Cortes  Room/Bed:   : 1955 78 y.o.      3/27/2023   POD#: 10 open recurrent incarcerated hernia    SUBJECTIVE:    Patient seen and examined. Pt feeling better, still having some pain. Having some hiccups. Passing flatus    Current Meds:  Scheduled Meds:   sodium chloride flush  5-40 mL IntraVENous 2 times per day    famotidine (PEPCID) injection  20 mg IntraVENous Daily    metoprolol  2.5 mg IntraVENous q8h    enoxaparin  40 mg SubCUTAneous Q24H     Continuous Infusions:   sodium chloride      sodium chloride 100 mL/hr at 23 0519     PRN Meds:.prochlorperazine, sodium chloride flush, sodium chloride, ondansetron **OR** ondansetron, acetaminophen **OR** acetaminophen, morphine, morphine, hydrALAZINE     OBJECTIVE:    Vital Signs:  Vitals:    23 0745   BP: (!) 137/93   Pulse: 90   Resp: 16   Temp: 97.5 °F (36.4 °C)   SpO2: 100%        I/O:    I/O last 3 completed shifts:   In: 2581.2 [I.V.:2581.2]  Out: 18 [Urine:940; Emesis/NG output:800]   I/O this shift:  In: -   Out: 300 [Urine:100; Emesis/NG output:200]      PHYSICAL EXAMINATION  General  Alert, cooperative, NAD  Lungs Unlabored breathing  Heart RRR  Abdominal Soft, nontender, nondistended, no involuntary guarding or rebound appreciated   Extremities No edema or gross deformities noted appreciated   Incisions C/d/i    Labs:   Lab Results   Component Value Date    WBC 4.9 2023    HGB 12.3 (L) 2023    HCT 37.6 (L) 2023    MCV 89.3 2023     2023        Lab Results   Component Value Date/Time     2023 05:37 AM    K 3.9 2023 05:37 AM     2023 05:37 AM    CO2 29 2023 05:37 AM    BUN 41 2023 05:37 AM    CREATININE 1.50 2023 05:37 AM    GLUCOSE 119 2023 05:37 AM    CALCIUM 8.5 2023 05:37

## 2023-03-27 NOTE — PROGRESS NOTES
END OF SHIFT NOTE:    INTAKE/OUTPUT  03/26 0701 - 03/27 0700  In: 2581.2 [I.V.:2581.2]  Out: 0217 [Urine:940]  Voiding: Yes  Catheter: No  Drain:   NG/OG/NJ/NE Tube Nasogastric Right nostril (Active)   Surrounding Skin Clean, dry & intact 03/27/23 0508   Securement device Tape 03/27/23 0508   Status Suction-low intermittent 03/27/23 0508   Placement Verified Gastric Contents 03/27/23 0508   NG/OG/NJ/NE External Measurement (cm) 55 cm 03/27/23 0508   Drainage Appearance Danielle Frame 03/27/23 0508   Output (mL) 350 ml 03/27/23 0508               Flatus: Patient does have flatus present. Stool:  0 occurrences. Last BM (including prior to admit): 03/26/2023       Emesis: 0 occurrences. VITAL SIGNS  Patient Vitals for the past 12 hrs:   Temp Pulse Resp BP SpO2   03/27/23 0351 98.8 °F (37.1 °C) 91 18 136/85 93 %   03/27/23 0258 -- -- 17 -- --   03/27/23 0228 -- -- 17 -- --   03/27/23 0222 -- 88 -- -- --   03/27/23 0022 97.7 °F (36.5 °C) 89 19 (!) 141/89 91 %   03/26/23 1957 -- -- 17 -- --   03/1955 98.6 °F (37 °C) 92 18 124/79 90 %   03/26/23 1915 -- 92 -- -- 91 %       Ambulating  Yes    Shift report given to oncoming nurse at the bedside.     Slava Vences RN

## 2023-03-27 NOTE — PROGRESS NOTES
[facility] Hospitalist Consult Follow-Up     Admit Date:  3/25/2023 10:27 PM   Name:  Surya Man   Age:  78 y.o. Sex:  male  :  1955   MRN:  [id number]   Room:      Presenting/Chief Complaint: Post-op Problem    Reason(s) for Admission: SBO (small bowel obstruction) ([facility] [K56.609]  Small bowel obstruction due to postoperative adhesions [K91.30]     Hospitalists consulted by Narciso Menchaca, * for: management of hypertension and SARI. Subjective / History:   Surya Man is a 78 y.o. male with history of  ventral hernia repaired on . He has been having some vomiting today and some lower abdominal discomfort. Hospitalist consulted for: management of hypertension and SARI. CT scan showed a high-grade bowel obstruction with transition point in the central abdomen likely due to adhesion. It also showed the repair of a large ventral hernia with a seroma. 3/27/2023   Patient does not feel well. He is lying on his side with the NG tube present. He has abdominal pain he describes is about 6/10. Assessment & Plan:     SBO (small bowel obstruction) (HCC)  NPO  NG tube was placed and remains LIS  Surgery is primary  There appears to be no plan for surgery today  Attempt small bowel follow-through    SARI   Most likely due to  pre renal and volume loss through NG  Creatinine is 1.8  and baseline is 1.5 which is present level  Follow-up with urine electrolytes  Avoid Nephrotoxic drugs  Maintain adequate volume replacement IV    Hypertension  Started on metoprolol 2.5 mg Q6hrs and we will continue monitoring  If needed will add hydralazine as needed    Hyperlipidemia  Continue home medication statin when he is able to take p.o. Diet:  Diet NPO  VTE prophylaxis: lovenox   Code status: Full Code    Principal Problem:    SBO (small bowel obstruction) ([facility] [address]  Resolved Problems:    * No resolved hospital problems.  *      Objective:   Patient Vitals for the past 24 the past 24 hour(s))   CBC with Auto Differential    Collection Time: 03/26/23 12:29 PM   Result Value Ref Range    WBC 5.6 4.3 - 11.1 K/uL    RBC 4.85 4.23 - 5.6 M/uL    Hemoglobin 14.2 13.6 - 17.2 g/dL    Hematocrit 42.7 41.1 - 50.3 %    MCV 88.0 82 - 102 FL    MCH 29.3 26.1 - 32.9 PG    MCHC 33.3 31.4 - 35.0 g/dL    RDW 12.5 11.9 - 14.6 %    Platelets 141 667 - 421 K/uL    MPV 8.6 (L) 9.4 - 12.3 FL    nRBC 0.00 0.0 - 0.2 K/uL    Seg Neutrophils 73 43 - 78 %    Lymphocytes 16 13 - 44 %    Monocytes 10 4.0 - 12.0 %    Eosinophils % 0 (L) 0.5 - 7.8 %    Basophils 1 0.0 - 2.0 %    Immature Granulocytes 0 0.0 - 5.0 %    Segs Absolute 4.0 1.7 - 8.2 K/UL    Absolute Lymph # 0.9 0.5 - 4.6 K/UL    Absolute Mono # 0.6 0.1 - 1.3 K/UL    Absolute Eos # 0.0 0.0 - 0.8 K/UL    Basophils Absolute 0.1 0.0 - 0.2 K/UL    Absolute Immature Granulocyte 0.0 0.0 - 0.5 K/UL    RBC Comment NORMOCYTIC/NORMOCHROMIC      WBC Comment Result Confirmed By Smear      Platelet Comment ADEQUATE      Differential Type AUTOMATED     Basic Metabolic Panel w/ Reflex to MG    Collection Time: 03/26/23 12:29 PM   Result Value Ref Range    Sodium 138 133 - 143 mmol/L    Potassium 4.2 3.5 - 5.1 mmol/L    Chloride 108 101 - 110 mmol/L    CO2 28 21 - 32 mmol/L    Anion Gap 2 2 - 11 mmol/L    Glucose 145 (H) 65 - 100 mg/dL    BUN 39 (H) 8 - 23 MG/DL    Creatinine 1.50 0.8 - 1.5 MG/DL    Est, Glom Filt Rate 51 (L) >60 ml/min/1.73m2    Calcium 8.8 8.3 - 10.4 MG/DL   Phosphorus    Collection Time: 03/26/23 12:29 PM   Result Value Ref Range    Phosphorus 3.9 (H) 2.3 - 3.7 MG/DL   Sodium, urine, random    Collection Time: 03/26/23  4:21 PM   Result Value Ref Range    SODIUM, RANDOM URINE 20 MMOL/L   Chloride, Random Urine    Collection Time: 03/26/23  4:21 PM   Result Value Ref Range    Chloride 28 MMOL/L   Creatinine, Random Urine    Collection Time: 03/26/23  4:21 PM   Result Value Ref Range    Creatinine, Ur 278.00 mg/dL   Osmolality, Urine    Collection

## 2023-03-28 ENCOUNTER — ANESTHESIA (OUTPATIENT)
Dept: SURGERY | Age: 68
End: 2023-03-28
Payer: MEDICAID

## 2023-03-28 ENCOUNTER — ANESTHESIA EVENT (OUTPATIENT)
Dept: SURGERY | Age: 68
End: 2023-03-28
Payer: MEDICAID

## 2023-03-28 ENCOUNTER — APPOINTMENT (OUTPATIENT)
Dept: GENERAL RADIOLOGY | Age: 68
End: 2023-03-28
Payer: MEDICAID

## 2023-03-28 LAB
ABO + RH BLD: NORMAL
ALBUMIN SERPL-MCNC: 3.1 G/DL (ref 3.2–4.6)
ALBUMIN/GLOB SERPL: 0.8 (ref 0.4–1.6)
ALP SERPL-CCNC: 58 U/L (ref 50–136)
ALT SERPL-CCNC: 12 U/L (ref 12–65)
ANION GAP SERPL CALC-SCNC: 3 MMOL/L (ref 2–11)
AST SERPL-CCNC: 11 U/L (ref 15–37)
BASOPHILS # BLD: 0 K/UL (ref 0–0.2)
BASOPHILS NFR BLD: 0 % (ref 0–2)
BILIRUB SERPL-MCNC: 0.5 MG/DL (ref 0.2–1.1)
BLOOD GROUP ANTIBODIES SERPL: NORMAL
BUN SERPL-MCNC: 49 MG/DL (ref 8–23)
CALCIUM SERPL-MCNC: 9 MG/DL (ref 8.3–10.4)
CHLORIDE SERPL-SCNC: 109 MMOL/L (ref 101–110)
CO2 SERPL-SCNC: 30 MMOL/L (ref 21–32)
CREAT SERPL-MCNC: 1.7 MG/DL (ref 0.8–1.5)
DIFFERENTIAL METHOD BLD: ABNORMAL
EOSINOPHIL # BLD: 0 K/UL (ref 0–0.8)
EOSINOPHIL NFR BLD: 0 % (ref 0.5–7.8)
ERYTHROCYTE [DISTWIDTH] IN BLOOD BY AUTOMATED COUNT: 12.5 % (ref 11.9–14.6)
GLOBULIN SER CALC-MCNC: 4.1 G/DL (ref 2.8–4.5)
GLUCOSE SERPL-MCNC: 138 MG/DL (ref 65–100)
HCT VFR BLD AUTO: 40.5 % (ref 41.1–50.3)
HGB BLD-MCNC: 13.5 G/DL (ref 13.6–17.2)
IMM GRANULOCYTES # BLD AUTO: 0 K/UL (ref 0–0.5)
IMM GRANULOCYTES NFR BLD AUTO: 0 % (ref 0–5)
LYMPHOCYTES # BLD: 1.4 K/UL (ref 0.5–4.6)
LYMPHOCYTES NFR BLD: 27 % (ref 13–44)
MCH RBC QN AUTO: 29.7 PG (ref 26.1–32.9)
MCHC RBC AUTO-ENTMCNC: 33.3 G/DL (ref 31.4–35)
MCV RBC AUTO: 89.2 FL (ref 82–102)
MONOCYTES # BLD: 0.7 K/UL (ref 0.1–1.3)
MONOCYTES NFR BLD: 14 % (ref 4–12)
NEUTS SEG # BLD: 3.1 K/UL (ref 1.7–8.2)
NEUTS SEG NFR BLD: 58 % (ref 43–78)
NRBC # BLD: 0 K/UL (ref 0–0.2)
PLATELET # BLD AUTO: 400 K/UL (ref 150–450)
PMV BLD AUTO: 8.8 FL (ref 9.4–12.3)
POTASSIUM SERPL-SCNC: 4 MMOL/L (ref 3.5–5.1)
PROT SERPL-MCNC: 7.2 G/DL (ref 6.3–8.2)
RBC # BLD AUTO: 4.54 M/UL (ref 4.23–5.6)
SODIUM SERPL-SCNC: 142 MMOL/L (ref 133–143)
SPECIMEN EXP DATE BLD: NORMAL
WBC # BLD AUTO: 5.3 K/UL (ref 4.3–11.1)

## 2023-03-28 PROCEDURE — A4216 STERILE WATER/SALINE, 10 ML: HCPCS | Performed by: NURSE PRACTITIONER

## 2023-03-28 PROCEDURE — P9041 ALBUMIN (HUMAN),5%, 50ML: HCPCS | Performed by: NURSE ANESTHETIST, CERTIFIED REGISTERED

## 2023-03-28 PROCEDURE — 2580000003 HC RX 258: Performed by: NURSE ANESTHETIST, CERTIFIED REGISTERED

## 2023-03-28 PROCEDURE — 2500000003 HC RX 250 WO HCPCS: Performed by: NURSE PRACTITIONER

## 2023-03-28 PROCEDURE — 0DQV0ZZ REPAIR MESENTERY, OPEN APPROACH: ICD-10-PCS | Performed by: STUDENT IN AN ORGANIZED HEALTH CARE EDUCATION/TRAINING PROGRAM

## 2023-03-28 PROCEDURE — 6360000002 HC RX W HCPCS: Performed by: NURSE ANESTHETIST, CERTIFIED REGISTERED

## 2023-03-28 PROCEDURE — 2580000003 HC RX 258

## 2023-03-28 PROCEDURE — 2500000003 HC RX 250 WO HCPCS: Performed by: NURSE ANESTHETIST, CERTIFIED REGISTERED

## 2023-03-28 PROCEDURE — 6360000002 HC RX W HCPCS: Performed by: ANESTHESIOLOGY

## 2023-03-28 PROCEDURE — 2720000010 HC SURG SUPPLY STERILE: Performed by: STUDENT IN AN ORGANIZED HEALTH CARE EDUCATION/TRAINING PROGRAM

## 2023-03-28 PROCEDURE — 3600000014 HC SURGERY LEVEL 4 ADDTL 15MIN: Performed by: STUDENT IN AN ORGANIZED HEALTH CARE EDUCATION/TRAINING PROGRAM

## 2023-03-28 PROCEDURE — 74018 RADEX ABDOMEN 1 VIEW: CPT

## 2023-03-28 PROCEDURE — 36415 COLL VENOUS BLD VENIPUNCTURE: CPT

## 2023-03-28 PROCEDURE — 94760 N-INVAS EAR/PLS OXIMETRY 1: CPT

## 2023-03-28 PROCEDURE — 0DJV4ZZ INSPECTION OF MESENTERY, PERCUTANEOUS ENDOSCOPIC APPROACH: ICD-10-PCS | Performed by: STUDENT IN AN ORGANIZED HEALTH CARE EDUCATION/TRAINING PROGRAM

## 2023-03-28 PROCEDURE — 6360000002 HC RX W HCPCS

## 2023-03-28 PROCEDURE — 2709999900 HC NON-CHARGEABLE SUPPLY: Performed by: STUDENT IN AN ORGANIZED HEALTH CARE EDUCATION/TRAINING PROGRAM

## 2023-03-28 PROCEDURE — 0WPF0JZ REMOVAL OF SYNTHETIC SUBSTITUTE FROM ABDOMINAL WALL, OPEN APPROACH: ICD-10-PCS | Performed by: STUDENT IN AN ORGANIZED HEALTH CARE EDUCATION/TRAINING PROGRAM

## 2023-03-28 PROCEDURE — 0DB80ZZ EXCISION OF SMALL INTESTINE, OPEN APPROACH: ICD-10-PCS | Performed by: STUDENT IN AN ORGANIZED HEALTH CARE EDUCATION/TRAINING PROGRAM

## 2023-03-28 PROCEDURE — 3700000001 HC ADD 15 MINUTES (ANESTHESIA): Performed by: STUDENT IN AN ORGANIZED HEALTH CARE EDUCATION/TRAINING PROGRAM

## 2023-03-28 PROCEDURE — 85025 COMPLETE CBC W/AUTO DIFF WBC: CPT

## 2023-03-28 PROCEDURE — 3600000004 HC SURGERY LEVEL 4 BASE: Performed by: STUDENT IN AN ORGANIZED HEALTH CARE EDUCATION/TRAINING PROGRAM

## 2023-03-28 PROCEDURE — 2700000000 HC OXYGEN THERAPY PER DAY

## 2023-03-28 PROCEDURE — 1100000000 HC RM PRIVATE

## 2023-03-28 PROCEDURE — 6360000002 HC RX W HCPCS: Performed by: STUDENT IN AN ORGANIZED HEALTH CARE EDUCATION/TRAINING PROGRAM

## 2023-03-28 PROCEDURE — 2580000003 HC RX 258: Performed by: NURSE PRACTITIONER

## 2023-03-28 PROCEDURE — 2500000003 HC RX 250 WO HCPCS: Performed by: STUDENT IN AN ORGANIZED HEALTH CARE EDUCATION/TRAINING PROGRAM

## 2023-03-28 PROCEDURE — 80053 COMPREHEN METABOLIC PANEL: CPT

## 2023-03-28 PROCEDURE — 3700000000 HC ANESTHESIA ATTENDED CARE: Performed by: STUDENT IN AN ORGANIZED HEALTH CARE EDUCATION/TRAINING PROGRAM

## 2023-03-28 PROCEDURE — 6360000002 HC RX W HCPCS: Performed by: NURSE PRACTITIONER

## 2023-03-28 PROCEDURE — 44120 REMOVAL OF SMALL INTESTINE: CPT | Performed by: STUDENT IN AN ORGANIZED HEALTH CARE EDUCATION/TRAINING PROGRAM

## 2023-03-28 PROCEDURE — 7100000000 HC PACU RECOVERY - FIRST 15 MIN: Performed by: STUDENT IN AN ORGANIZED HEALTH CARE EDUCATION/TRAINING PROGRAM

## 2023-03-28 PROCEDURE — 86900 BLOOD TYPING SEROLOGIC ABO: CPT

## 2023-03-28 PROCEDURE — 7100000001 HC PACU RECOVERY - ADDTL 15 MIN: Performed by: STUDENT IN AN ORGANIZED HEALTH CARE EDUCATION/TRAINING PROGRAM

## 2023-03-28 RX ORDER — SUCCINYLCHOLINE/SOD CL,ISO/PF 200MG/10ML
SYRINGE (ML) INTRAVENOUS PRN
Status: DISCONTINUED | OUTPATIENT
Start: 2023-03-28 | End: 2023-03-28 | Stop reason: SDUPTHER

## 2023-03-28 RX ORDER — DEXAMETHASONE SODIUM PHOSPHATE 10 MG/ML
INJECTION INTRAMUSCULAR; INTRAVENOUS PRN
Status: DISCONTINUED | OUTPATIENT
Start: 2023-03-28 | End: 2023-03-28 | Stop reason: SDUPTHER

## 2023-03-28 RX ORDER — OXYCODONE HYDROCHLORIDE 5 MG/1
5 TABLET ORAL
Status: DISCONTINUED | OUTPATIENT
Start: 2023-03-28 | End: 2023-03-28 | Stop reason: HOSPADM

## 2023-03-28 RX ORDER — HALOPERIDOL 5 MG/ML
1 INJECTION INTRAMUSCULAR
Status: DISCONTINUED | OUTPATIENT
Start: 2023-03-28 | End: 2023-03-28 | Stop reason: HOSPADM

## 2023-03-28 RX ORDER — ALBUMIN (HUMAN) 12.5 G/50ML
25 SOLUTION INTRAVENOUS ONCE
Status: DISCONTINUED | OUTPATIENT
Start: 2023-03-28 | End: 2023-03-29

## 2023-03-28 RX ORDER — HYDROMORPHONE HCL 110MG/55ML
0.5 PATIENT CONTROLLED ANALGESIA SYRINGE INTRAVENOUS
Status: DISPENSED | OUTPATIENT
Start: 2023-03-28 | End: 2023-03-28

## 2023-03-28 RX ORDER — SODIUM CHLORIDE 9 MG/ML
INJECTION, SOLUTION INTRAVENOUS PRN
Status: DISCONTINUED | OUTPATIENT
Start: 2023-03-28 | End: 2023-03-28 | Stop reason: HOSPADM

## 2023-03-28 RX ORDER — LIDOCAINE HYDROCHLORIDE AND EPINEPHRINE 10; 10 MG/ML; UG/ML
INJECTION, SOLUTION INFILTRATION; PERINEURAL PRN
Status: DISCONTINUED | OUTPATIENT
Start: 2023-03-28 | End: 2023-03-28 | Stop reason: HOSPADM

## 2023-03-28 RX ORDER — ROCURONIUM BROMIDE 10 MG/ML
INJECTION, SOLUTION INTRAVENOUS PRN
Status: DISCONTINUED | OUTPATIENT
Start: 2023-03-28 | End: 2023-03-28 | Stop reason: SDUPTHER

## 2023-03-28 RX ORDER — HYDROMORPHONE HYDROCHLORIDE 2 MG/ML
0.25 INJECTION, SOLUTION INTRAMUSCULAR; INTRAVENOUS; SUBCUTANEOUS EVERY 5 MIN PRN
Status: DISCONTINUED | OUTPATIENT
Start: 2023-03-28 | End: 2023-03-28 | Stop reason: HOSPADM

## 2023-03-28 RX ORDER — SODIUM CHLORIDE 0.9 % (FLUSH) 0.9 %
5-40 SYRINGE (ML) INJECTION EVERY 12 HOURS SCHEDULED
Status: DISCONTINUED | OUTPATIENT
Start: 2023-03-28 | End: 2023-03-28 | Stop reason: HOSPADM

## 2023-03-28 RX ORDER — DEXMEDETOMIDINE HYDROCHLORIDE 100 UG/ML
INJECTION, SOLUTION INTRAVENOUS PRN
Status: DISCONTINUED | OUTPATIENT
Start: 2023-03-28 | End: 2023-03-28 | Stop reason: SDUPTHER

## 2023-03-28 RX ORDER — DEXTROSE MONOHYDRATE 100 MG/ML
INJECTION, SOLUTION INTRAVENOUS CONTINUOUS PRN
Status: DISCONTINUED | OUTPATIENT
Start: 2023-03-28 | End: 2023-03-28 | Stop reason: HOSPADM

## 2023-03-28 RX ORDER — 0.9 % SODIUM CHLORIDE 0.9 %
500 INTRAVENOUS SOLUTION INTRAVENOUS ONCE
Status: COMPLETED | OUTPATIENT
Start: 2023-03-28 | End: 2023-03-28

## 2023-03-28 RX ORDER — ALBUMIN, HUMAN INJ 5% 5 %
SOLUTION INTRAVENOUS PRN
Status: DISCONTINUED | OUTPATIENT
Start: 2023-03-28 | End: 2023-03-28 | Stop reason: SDUPTHER

## 2023-03-28 RX ORDER — ONDANSETRON 2 MG/ML
INJECTION INTRAMUSCULAR; INTRAVENOUS PRN
Status: DISCONTINUED | OUTPATIENT
Start: 2023-03-28 | End: 2023-03-28 | Stop reason: SDUPTHER

## 2023-03-28 RX ORDER — EPHEDRINE SULFATE/0.9% NACL/PF 50 MG/5 ML
SYRINGE (ML) INTRAVENOUS PRN
Status: DISCONTINUED | OUTPATIENT
Start: 2023-03-28 | End: 2023-03-28 | Stop reason: SDUPTHER

## 2023-03-28 RX ORDER — BUPIVACAINE HYDROCHLORIDE 2.5 MG/ML
INJECTION, SOLUTION EPIDURAL; INFILTRATION; INTRACAUDAL PRN
Status: DISCONTINUED | OUTPATIENT
Start: 2023-03-28 | End: 2023-03-28 | Stop reason: HOSPADM

## 2023-03-28 RX ORDER — ONDANSETRON 2 MG/ML
4 INJECTION INTRAMUSCULAR; INTRAVENOUS
Status: DISCONTINUED | OUTPATIENT
Start: 2023-03-28 | End: 2023-03-28 | Stop reason: HOSPADM

## 2023-03-28 RX ORDER — LIDOCAINE HYDROCHLORIDE 20 MG/ML
INJECTION, SOLUTION EPIDURAL; INFILTRATION; INTRACAUDAL; PERINEURAL PRN
Status: DISCONTINUED | OUTPATIENT
Start: 2023-03-28 | End: 2023-03-28 | Stop reason: SDUPTHER

## 2023-03-28 RX ORDER — SODIUM CHLORIDE, SODIUM LACTATE, POTASSIUM CHLORIDE, CALCIUM CHLORIDE 600; 310; 30; 20 MG/100ML; MG/100ML; MG/100ML; MG/100ML
INJECTION, SOLUTION INTRAVENOUS CONTINUOUS
Status: DISCONTINUED | OUTPATIENT
Start: 2023-03-28 | End: 2023-03-29

## 2023-03-28 RX ORDER — SODIUM CHLORIDE 0.9 % (FLUSH) 0.9 %
5-40 SYRINGE (ML) INJECTION PRN
Status: DISCONTINUED | OUTPATIENT
Start: 2023-03-28 | End: 2023-03-28 | Stop reason: HOSPADM

## 2023-03-28 RX ORDER — PROPOFOL 10 MG/ML
INJECTION, EMULSION INTRAVENOUS PRN
Status: DISCONTINUED | OUTPATIENT
Start: 2023-03-28 | End: 2023-03-28 | Stop reason: SDUPTHER

## 2023-03-28 RX ORDER — SODIUM CHLORIDE, SODIUM LACTATE, POTASSIUM CHLORIDE, CALCIUM CHLORIDE 600; 310; 30; 20 MG/100ML; MG/100ML; MG/100ML; MG/100ML
INJECTION, SOLUTION INTRAVENOUS CONTINUOUS PRN
Status: DISCONTINUED | OUTPATIENT
Start: 2023-03-28 | End: 2023-03-28 | Stop reason: SDUPTHER

## 2023-03-28 RX ORDER — ESMOLOL HYDROCHLORIDE 10 MG/ML
INJECTION INTRAVENOUS PRN
Status: DISCONTINUED | OUTPATIENT
Start: 2023-03-28 | End: 2023-03-28 | Stop reason: SDUPTHER

## 2023-03-28 RX ADMIN — MORPHINE SULFATE 2 MG: 2 INJECTION, SOLUTION INTRAMUSCULAR; INTRAVENOUS at 04:19

## 2023-03-28 RX ADMIN — PHENYLEPHRINE HYDROCHLORIDE 120 MCG: 10 INJECTION INTRAVENOUS at 15:53

## 2023-03-28 RX ADMIN — HYDROMORPHONE HYDROCHLORIDE 0.25 MG: 2 INJECTION, SOLUTION INTRAMUSCULAR; INTRAVENOUS; SUBCUTANEOUS at 17:17

## 2023-03-28 RX ADMIN — Medication 2000 MG: at 14:44

## 2023-03-28 RX ADMIN — METOPROLOL TARTRATE 2.5 MG: 5 INJECTION, SOLUTION INTRAVENOUS at 21:33

## 2023-03-28 RX ADMIN — PROCHLORPERAZINE EDISYLATE 10 MG: 5 INJECTION INTRAMUSCULAR; INTRAVENOUS at 12:21

## 2023-03-28 RX ADMIN — HYDROMORPHONE HYDROCHLORIDE 0.5 MG: 2 INJECTION, SOLUTION INTRAMUSCULAR; INTRAVENOUS; SUBCUTANEOUS at 17:35

## 2023-03-28 RX ADMIN — DEXMEDETOMIDINE 16 MCG: 100 INJECTION, SOLUTION, CONCENTRATE INTRAVENOUS at 14:58

## 2023-03-28 RX ADMIN — PHENYLEPHRINE HYDROCHLORIDE 120 MCG: 10 INJECTION INTRAVENOUS at 15:54

## 2023-03-28 RX ADMIN — LIDOCAINE HYDROCHLORIDE 60 MG: 20 INJECTION, SOLUTION EPIDURAL; INFILTRATION; INTRACAUDAL; PERINEURAL at 14:38

## 2023-03-28 RX ADMIN — PHENYLEPHRINE HYDROCHLORIDE 100 MCG: 10 INJECTION INTRAVENOUS at 15:33

## 2023-03-28 RX ADMIN — SODIUM CHLORIDE, SODIUM LACTATE, POTASSIUM CHLORIDE, AND CALCIUM CHLORIDE: 600; 310; 30; 20 INJECTION, SOLUTION INTRAVENOUS at 16:09

## 2023-03-28 RX ADMIN — PHENYLEPHRINE HYDROCHLORIDE 200 MCG: 10 INJECTION INTRAVENOUS at 16:19

## 2023-03-28 RX ADMIN — HYDROMORPHONE HYDROCHLORIDE 0.5 MG: 1 INJECTION, SOLUTION INTRAMUSCULAR; INTRAVENOUS; SUBCUTANEOUS at 08:00

## 2023-03-28 RX ADMIN — HYDROMORPHONE HYDROCHLORIDE 0.25 MG: 2 INJECTION, SOLUTION INTRAMUSCULAR; INTRAVENOUS; SUBCUTANEOUS at 17:12

## 2023-03-28 RX ADMIN — MORPHINE SULFATE 2 MG: 2 INJECTION, SOLUTION INTRAMUSCULAR; INTRAVENOUS at 22:53

## 2023-03-28 RX ADMIN — PHENYLEPHRINE HYDROCHLORIDE 120 MCG: 10 INJECTION INTRAVENOUS at 15:52

## 2023-03-28 RX ADMIN — PHENYLEPHRINE HYDROCHLORIDE 200 MCG: 10 INJECTION INTRAVENOUS at 16:23

## 2023-03-28 RX ADMIN — HYDROMORPHONE HYDROCHLORIDE 0.5 MG: 1 INJECTION, SOLUTION INTRAMUSCULAR; INTRAVENOUS; SUBCUTANEOUS at 20:02

## 2023-03-28 RX ADMIN — ONDANSETRON 4 MG: 2 INJECTION INTRAMUSCULAR; INTRAVENOUS at 14:48

## 2023-03-28 RX ADMIN — ROCURONIUM BROMIDE 40 MG: 50 INJECTION, SOLUTION INTRAVENOUS at 14:47

## 2023-03-28 RX ADMIN — PHENYLEPHRINE HYDROCHLORIDE 150 MCG: 10 INJECTION INTRAVENOUS at 15:58

## 2023-03-28 RX ADMIN — PHENYLEPHRINE HYDROCHLORIDE 120 MCG: 10 INJECTION INTRAVENOUS at 15:43

## 2023-03-28 RX ADMIN — PHENYLEPHRINE HYDROCHLORIDE 180 MCG: 10 INJECTION INTRAVENOUS at 16:00

## 2023-03-28 RX ADMIN — HYDROMORPHONE HYDROCHLORIDE 0.5 MG: 1 INJECTION, SOLUTION INTRAMUSCULAR; INTRAVENOUS; SUBCUTANEOUS at 00:07

## 2023-03-28 RX ADMIN — SODIUM CHLORIDE 500 ML: 9 INJECTION, SOLUTION INTRAVENOUS at 11:12

## 2023-03-28 RX ADMIN — DEXMEDETOMIDINE 16 MCG: 100 INJECTION, SOLUTION, CONCENTRATE INTRAVENOUS at 15:34

## 2023-03-28 RX ADMIN — SODIUM CHLORIDE, PRESERVATIVE FREE 10 ML: 5 INJECTION INTRAVENOUS at 08:01

## 2023-03-28 RX ADMIN — SODIUM CHLORIDE, PRESERVATIVE FREE 10 ML: 5 INJECTION INTRAVENOUS at 21:34

## 2023-03-28 RX ADMIN — ALBUMIN (HUMAN) 25 G: 12.5 SOLUTION INTRAVENOUS at 16:33

## 2023-03-28 RX ADMIN — MORPHINE SULFATE 2 MG: 2 INJECTION, SOLUTION INTRAMUSCULAR; INTRAVENOUS at 11:11

## 2023-03-28 RX ADMIN — PHENYLEPHRINE HYDROCHLORIDE 150 MCG: 10 INJECTION INTRAVENOUS at 15:56

## 2023-03-28 RX ADMIN — Medication 5 MG: at 15:58

## 2023-03-28 RX ADMIN — PHENYLEPHRINE HYDROCHLORIDE 180 MCG: 10 INJECTION INTRAVENOUS at 15:59

## 2023-03-28 RX ADMIN — ESMOLOL HYDROCHLORIDE 20 MG: 10 INJECTION, SOLUTION INTRAVENOUS at 15:21

## 2023-03-28 RX ADMIN — PROPOFOL 80 MG: 10 INJECTION, EMULSION INTRAVENOUS at 14:38

## 2023-03-28 RX ADMIN — PHENYLEPHRINE HYDROCHLORIDE 200 MCG: 10 INJECTION INTRAVENOUS at 16:21

## 2023-03-28 RX ADMIN — HYDROMORPHONE HYDROCHLORIDE 0.25 MG: 2 INJECTION, SOLUTION INTRAMUSCULAR; INTRAVENOUS; SUBCUTANEOUS at 17:28

## 2023-03-28 RX ADMIN — Medication 5 MG: at 15:56

## 2023-03-28 RX ADMIN — PHENYLEPHRINE HYDROCHLORIDE 150 MCG: 10 INJECTION INTRAVENOUS at 15:57

## 2023-03-28 RX ADMIN — METOPROLOL TARTRATE 2.5 MG: 5 INJECTION, SOLUTION INTRAVENOUS at 12:21

## 2023-03-28 RX ADMIN — FAMOTIDINE 20 MG: 10 INJECTION INTRAVENOUS at 08:01

## 2023-03-28 RX ADMIN — METOPROLOL TARTRATE 2.5 MG: 5 INJECTION, SOLUTION INTRAVENOUS at 04:19

## 2023-03-28 RX ADMIN — SODIUM CHLORIDE, SODIUM LACTATE, POTASSIUM CHLORIDE, AND CALCIUM CHLORIDE: 600; 310; 30; 20 INJECTION, SOLUTION INTRAVENOUS at 14:30

## 2023-03-28 RX ADMIN — ENOXAPARIN SODIUM 40 MG: 100 INJECTION SUBCUTANEOUS at 21:33

## 2023-03-28 RX ADMIN — DEXAMETHASONE SODIUM PHOSPHATE 10 MG: 10 INJECTION INTRAMUSCULAR; INTRAVENOUS at 14:48

## 2023-03-28 RX ADMIN — Medication 120 MG: at 14:38

## 2023-03-28 ASSESSMENT — PAIN SCALES - GENERAL
PAINLEVEL_OUTOF10: 10
PAINLEVEL_OUTOF10: 6
PAINLEVEL_OUTOF10: 0
PAINLEVEL_OUTOF10: 10
PAINLEVEL_OUTOF10: 9
PAINLEVEL_OUTOF10: 0
PAINLEVEL_OUTOF10: 8
PAINLEVEL_OUTOF10: 7
PAINLEVEL_OUTOF10: 9
PAINLEVEL_OUTOF10: 0
PAINLEVEL_OUTOF10: 0
PAINLEVEL_OUTOF10: 10
PAINLEVEL_OUTOF10: 6

## 2023-03-28 ASSESSMENT — PAIN - FUNCTIONAL ASSESSMENT
PAIN_FUNCTIONAL_ASSESSMENT: 0-10
PAIN_FUNCTIONAL_ASSESSMENT: 0-10

## 2023-03-28 ASSESSMENT — PAIN DESCRIPTION - DESCRIPTORS
DESCRIPTORS: STABBING
DESCRIPTORS: PRESSURE;SORE
DESCRIPTORS: SHARP;STABBING

## 2023-03-28 ASSESSMENT — PAIN DESCRIPTION - LOCATION
LOCATION: ABDOMEN

## 2023-03-28 ASSESSMENT — PAIN DESCRIPTION - ORIENTATION
ORIENTATION: ANTERIOR;LOWER
ORIENTATION: ANTERIOR;LOWER

## 2023-03-28 ASSESSMENT — LIFESTYLE VARIABLES: SMOKING_STATUS: 1

## 2023-03-28 NOTE — OP NOTE
Diagnostic Laparoscopy Note    Name:  Sterling Carr Date/Time of Admission: 3/25/2023 10:27 PM  CSN: [id number]  Attending Provider: Luis Daniel Graham, Kim  MRN:  [id number]  : 1955 78 y.o. Pre-operative Diagnosis: Small bowel obstruction    Post-operative Diagnosis: Small bowel obstruction due to an internal hernia    Procedure:  Diagnostic laparoscopy, converted to exploratory laparotomy, segmental small bowel resection, reduction of internal hernia, explantation of mesh    Surgeon: Pantera Pelaez DO    Anesthesia: General endotracheal anesthesia    Specimen: none    INDICATION: This patient presents with chronic abdominal pain and will undergo diagnostic laparoscopy. The risks, benefits, complications, treatment options, and expected outcomes were discussed with the patient. The possibilities of reaction to medication, pulmonary aspiration, perforation of viscus, bleeding, recurrent infection, the need for additional procedures, failure to diagnose a condition, the possible need to convert to an open procedure, and creating a complication requiring transfusion or operation were discussed with the patient. The patient and/or family concurred with the proposed plan, giving informed consent. DESCRIPTION OF PROCEDURE: Patient is correctly identified in preoperative holding area. Consent was confirmed and placed on the chart. Preoperative antibiotics were administered per SCIP protocol. The patient was then taken back to the operative suite and placed in the supine position. General anesthesia was performed per anesthesia via an endotracheal tube. The patient's abdomen was then prepped and draped in the usual sterile fashion. A timeout was performed and all members of the team that were present were in agreement. The procedure began by locating a spot approximately 2 fingerbreadths below the patient's left subcostal margin here this area was locally anesthetized.   Next using a 15 bowel.  I then took a blue staple load and resected this portion of small bowel utilizing a side-to-side functional end-to-end anastomosis. I then closed this mesenteric defect with a running silk stitch. I copiously irrigated out the abdomen. The patient was noted to have no other acute abnormalities. I placed his bowels back into his abdomen in the correct positioning. I then utilized 0 Vicryl in a figure-of-eight fashion to close his fascial defect. I did not place any mesh for he had feculent contamination. I did place a drain within the pelvis through one of his trocar holes. The skin was closed with staples and the trocar sites were closed with 3-0 Monocryl. The patient tolerated the procedure well and was brought to PACU in stable condition. Estimated Blood Loss: Minimal         Complications: None; patient tolerated the procedure well. Disposition: PACU - hemodynamically stable.            Condition: stable    Electronically signed by: Luis Alberto Turner DO 3/28/2023

## 2023-03-28 NOTE — PROGRESS NOTES
[facility] Hospitalist Consult Follow-Up     Admit Date:  3/25/2023 10:27 PM   Name:  Mary Jo Monday   Age:  78 y.o. Sex:  male  :  1955   MRN:  [id number]   Room:  [facility]/    Presenting/Chief Complaint: Post-op Problem    Reason(s) for Admission: SBO (small bowel obstruction) ([facility] [address] [K56.609]  Small bowel obstruction due to postoperative adhesions [K91.30]     Hospitalists consulted by Mary Degroot, * for: management of hypertension and SARI. Subjective / History:   Mary Jo Monday is a 78 y.o. male with history of  ventral hernia repaired on . He has been having some vomiting today and some lower abdominal discomfort. Hospitalist consulted for: management of hypertension and SARI. CT scan showed a high-grade bowel obstruction with transition point in the central abdomen likely due to adhesion. It also showed the repair of a large ventral hernia with a seroma. Patient seen and examined  Awake and able to tell his name, follows commands. Complains of abdominal pain  States he had abdominal surgery in the  and he went home and was able to eat but then admitted for nausea vomiting and suspected small bowel obstruction. Patient states that he lives by himself    Assessment & Plan:     SBO (small bowel obstruction) ([facility] [address]  NPO  NG tube in place  Postoperative diagnosis small bowel obstruction due to internal hernia  Underwent diagnostic laparoscopy that was converted to exploratory lap, segmental small bowel resection, reduction of internal hernia, explantation of mesh    SARI   Most likely due to  pre renal and volume loss through NG  Creatinine 1.7 today  Follow-up with urine electrolytes  Avoid Nephrotoxic drugs  Maintain adequate volume replacement IV    Hypertension  Started on metoprolol 2.5 mg Q6hrs and we will continue monitoring  Blood pressure in 130s    Hyperlipidemia  Continue home medication statin when he is able to take p.o.       Diet:  Diet NPO  VTE image 65. Some mesenteric swirling at transition point. Bowel distal to the transition is entirely collapsed. Normal bowel wall enhancement. Mesentery/Peritoneum: Normal. Vasculature: Atherosclerotic plaque no aneurysm. Lymph Nodes: Normal. Abdominal Wall: Previously identified large midline ventral hernia has been repaired. Small seroma remains measuring 7.1 x 1.6 x 5.6 cm. Bladder: Normal. Reproductive: Normal. Musculoskeletal: Mild anterolisthesis of L4 in relation to L5 secondary to facet  arthropathy. 1.  High-grade small bowel obstruction with transition point in the right mid abdomen likely secondary to adhesion. 2.  Interval repair of large ventral hernia with postoperative anterior abdominal wall fluid collection that most likely represents a seroma. Nuria Gamble M.D., [facility] 3/26/2023 12:32:00 AM      Echocardiogram:  No results found for this or any previous visit. Signed:  Leonie Scott MD    Part of this note may have been written by using a voice dictation software. The note has been proof read but may still contain some grammatical/other typographical errors.

## 2023-03-28 NOTE — PROGRESS NOTES
TRANSFER - IN REPORT:    Verbal report received from Marie Lozano on Puneet Lopez  being received from pacu for routine progression of patient care      Report consisted of patient's Situation, Background, Assessment and   Recommendations(SBAR). Information from the following report(s) Nurse Handoff Report, Index, Adult Overview, Surgery Report, Intake/Output, and MAR was reviewed with the receiving nurse. Opportunity for questions and clarification was provided. Assessment completed upon patient's arrival to unit and care assumed.

## 2023-03-28 NOTE — ANESTHESIA PROCEDURE NOTES
Airway  Date/Time: 3/28/2023 2:42 PM  Urgency: elective      General Information and Staff    Patient location during procedure: OR  Resident/CRNA: FRANCISCO John - TG    Indications and Patient Condition  Indications for airway management: anesthesia and airway protection  Sedation level: deep  Preoxygenated: yes  Patient position: sniffing  MILS not maintained throughout  Mask difficulty assessment: not attempted (RSI)    Final Airway Details  Final airway type: endotracheal airway      Successful airway: ETT     Successful intubation technique: direct laryngoscopy  Endotracheal tube insertion site: oral  Blade: Ashwin  Blade size: #4  ETT size (mm): 7.5  Cormack-Lehane Classification: grade I - full view of glottis  Placement verified by: chest auscultation and capnometry   Number of attempts at approach: 1  Number of other approaches attempted: 0    Additional Comments  LIPS/DENTITION AS PRE-OP

## 2023-03-28 NOTE — PROGRESS NOTES
END OF SHIFT NOTE:    INTAKE/OUTPUT  03/27 0701 - 03/28 0700  In: 2282.4 [I.V.:2282.4]  Out: 7065 [Urine:900]  Voiding: Yes  Catheter: No  Drain:   NG/OG/NJ/NE Tube Nasogastric Right nostril (Active)   Surrounding Skin Clean, dry & intact 03/27/23 1957   Securement device Tape 03/27/23 1957   Status Suction-low intermittent 03/27/23 1957   Placement Verified External Catheter Length 03/27/23 1957   NG/OG/NJ/NE External Measurement (cm) 55 cm 03/27/23 1957   Drainage Appearance Irish Rucker 03/28/23 0215   Output (mL) 400 ml 03/28/23 0549     NGT total: 2,250ml          Flatus: Patient does have flatus present. Stool: 0 occurrences. Characteristics:  Stool Appearance: Unable to assess  Stool Color: Unable to assess  Stool Amount: Unable to assess  Stool Assessment  Stool Appearance: Unable to assess  Stool Color: Unable to assess  Stool Amount: Unable to assess  Last BM (including prior to admit): 03/26/23 (per pt)    Emesis: 0 occurrences. Characteristics:   Emesis Appearance: [address]  Patient Vitals for the past 12 hrs:   Temp Pulse Resp BP SpO2   03/28/23 0449 -- -- 16 -- --   03/28/23 0441 98.2 °F (36.8 °C) 96 17 121/83 91 %   03/28/23 0419 -- -- 18 -- --   03/28/23 0037 -- -- 16 -- --   03/28/23 0007 -- -- 16 -- --   03/27/23 2250 -- (!) 102 -- -- --   03/27/23 2240 99.7 °F (37.6 °C) (!) 130 16 128/81 91 %   03/27/23 2027 -- -- 16 -- --   03/27/23 1957 -- (!) 104 20 -- --   03/27/23 1921 98.2 °F (36.8 °C) (!) 107 18 137/87 91 %       Pain Assessment  Pain Level: 0 (03/28/23 0449)  Pain Location: Abdomen  Patient's Stated Pain Goal: 0 - No pain    Ambulating  No    Shift report given to oncoming nurse at the bedside.     Garland Gutierres RN

## 2023-03-28 NOTE — PERIOP NOTE
TRANSFER - OUT REPORT:    Verbal report given to [facility], RN on Catia Stuart  being transferred to  for routine progression of patient care       Report consisted of patients Situation, Background, Assessment and   Recommendations(SBAR). Information from the following report(s) Nurse Handoff Report, Adult Overview, Surgery Report, and Cardiac Rhythm NSR  was reviewed with the receiving nurse. Lines:   Peripheral IV 03/25/23 Left Forearm (Active)   Site Assessment Clean, dry & intact 03/28/23 0800   Line Status Capped 03/28/23 0800   Line Care Cap changed 03/28/23 0800   Phlebitis Assessment No symptoms 03/28/23 0800   Infiltration Assessment 0 03/28/23 0800   Alcohol Cap Used No 03/28/23 0800   Dressing Status Clean, dry & intact 03/28/23 0800   Dressing Type Transparent 03/28/23 0800       Peripheral IV 03/25/23 Right Forearm (Active)   Site Assessment Clean, dry & intact 03/28/23 0800   Line Status Infusing 03/28/23 0800   Line Care Connections checked and tightened 03/28/23 0800   Phlebitis Assessment No symptoms 03/28/23 0800   Infiltration Assessment 0 03/28/23 0800   Alcohol Cap Used Yes 03/28/23 0800   Dressing Status Clean, dry & intact 03/28/23 0800   Dressing Type Transparent 03/28/23 0800       Peripheral IV 03/28/23 Left Hand (Active)        Opportunity for questions and clarification was provided. Patient transported with:   O2 @ 3 liters  Tech    VTE prophylaxis orders have been written for Catia Stuart. Patient and family given floor number and nurses name. Family updated re: pt status after security code verified.

## 2023-03-28 NOTE — PROGRESS NOTES
1935: Pt complaining of hiccups stating \"they are making my pain worse\" [place], NP notified & new order acknowledged for thorazine 25mg IVPB x1.

## 2023-03-28 NOTE — ANESTHESIA POSTPROCEDURE EVALUATION
Department of Anesthesiology  Postprocedure Note    Patient: Unique Salinas  MRN: [id number]  YOB: 1955  Date of evaluation: 3/28/2023      Procedure Summary     Date: 03/28/23 Room / Location: [facility] MAIN OR 08 / [facility] MAIN OR    Anesthesia Start: 3913 Anesthesia Stop: 2196    Procedure: LAPAROSCOPY DIAGNOSTIC, converted to open (Abdomen) Diagnosis:       Small bowel obstruction ([address]      (Small bowel obstruction ([address] [B74.043])    Providers: Sandrine Eastman DO Responsible Provider: Ira Mccoy MD    Anesthesia Type: General ASA Status: 3 - Emergent          Anesthesia Type: General    Solomon Phase I: Solomon Score: 8    Solomon Phase II:        Anesthesia Post Evaluation    Patient location during evaluation: bedside  Patient participation: complete - patient participated  Level of consciousness: awake and alert  Pain score: 1  Airway patency: patent  Nausea & Vomiting: no vomiting  Complications: no  Cardiovascular status: hemodynamically stable  Respiratory status: acceptable  Hydration status: euvolemic

## 2023-03-28 NOTE — PROGRESS NOTES
Admit Date: 3/25/2023    3/28/2023   POD#: 11 open recurrent incarcerated hernia    Subjective:     Patient alert and oriented x4 with NAD noted. Respirations even and unlabored on room air. NGT LIS in place with green output. Abdominal pain 10/10 at worst and 6/10 at best with as needed IV Dilaudid. SBFT 3/27/2023 with no evidence of contrast in colon indicating distal small bowel obstruction  Patient endorses flatus and abdomen is semisoft    Objective:       Vitals:    23 0419 23 0441 23 0449 23 0714   BP:  121/83  126/87   Pulse:  96  98   Resp: 18 17 16 15   Temp:  98.2 °F (36.8 °C)  98 °F (36.7 °C)   TempSrc:  Oral  Oral   SpO2:  91%     Weight:       Height:           Temp (24hrs), Av.4 °F (36.9 °C), Min:97.7 °F (36.5 °C), Max:99.7 °F (37.6 °C)  . I&O reviewed as documented. NGT LIS 3650 mL green output past 24 hours  900 mL UOP past 24 hours  Positive flatus 3/28/2023 AM  Heart rate =  past 24 hours; otherwise, VSS    Physical Exam  Constitutional:       General: He is not in acute distress. Appearance: Normal appearance. HENT:      Mouth/Throat:      Mouth: Mucous membranes are dry. Cardiovascular:      Rate and Rhythm: Regular rhythm. Tachycardia present. Pulses: Normal pulses. Heart sounds: Normal heart sounds. No murmur heard. No friction rub. No gallop. Pulmonary:      Effort: Pulmonary effort is normal. No respiratory distress. Breath sounds: Normal breath sounds. Abdominal:      General: There is no distension. Comments: Abdomen is semisoft with hypoactive bowel sounds all quadrants. Patient endorses flatus 3/28/2023 AM.  Last bowel movement 3/24/2023   Genitourinary:     Comments: Voiding  Musculoskeletal:         General: No swelling. Normal range of motion. Cervical back: No rigidity. Skin:     General: Skin is warm and dry. Capillary Refill: Capillary refill takes less than 2 seconds.       Comments: Midline EXAMINATION: XR ABDOMEN (KUB) (SINGLE AP VIEW)         DATE OF EXAM: 3/28/2023 4:53 AM       HISTORY: follow up for SBFT       COMPARISON: 3/27/2023, 3/28/2023. 3/26/2023. Impression   Persistent abnormally dilated small bowel contain a similar amount of    intraluminal contrast. No evidence of contrast material within large bowel. Wilfredo Gregory D.O.    3/28/2023 6:54:00 AM       Assessment:     Principal Problem:    SBO (small bowel obstruction) ([address]  Resolved Problems:    * No resolved hospital problems. *        Plan/Recommendations/Medical Decision Making:     Patient with small bowel obstruction in the distal small bowel. NGT LIS 3650 mL green output past 24 hours.   ABD pain 10/10 at worst 3/10 at best.    Plan for diagnostic laparoscopy 3/28/2023 pm    FRANCISCO Herrera - NP

## 2023-03-28 NOTE — ANESTHESIA PRE PROCEDURE
questions            Anesthesia Plan      general     ASA 3 - emergent     (GETA, RSI)  Induction: intravenous and rapid sequence. MIPS: Postoperative opioids intended. Anesthetic plan and risks discussed with patient. Plan discussed with CRNA.                     Justin Blackburn MD   3/28/2023

## 2023-03-29 PROCEDURE — 97162 PT EVAL MOD COMPLEX 30 MIN: CPT

## 2023-03-29 PROCEDURE — 2580000003 HC RX 258: Performed by: HOSPITALIST

## 2023-03-29 PROCEDURE — 2580000003 HC RX 258: Performed by: NURSE PRACTITIONER

## 2023-03-29 PROCEDURE — P9041 ALBUMIN (HUMAN),5%, 50ML: HCPCS

## 2023-03-29 PROCEDURE — 2500000003 HC RX 250 WO HCPCS: Performed by: STUDENT IN AN ORGANIZED HEALTH CARE EDUCATION/TRAINING PROGRAM

## 2023-03-29 PROCEDURE — A4216 STERILE WATER/SALINE, 10 ML: HCPCS | Performed by: NURSE PRACTITIONER

## 2023-03-29 PROCEDURE — 6360000002 HC RX W HCPCS: Performed by: STUDENT IN AN ORGANIZED HEALTH CARE EDUCATION/TRAINING PROGRAM

## 2023-03-29 PROCEDURE — 97530 THERAPEUTIC ACTIVITIES: CPT

## 2023-03-29 PROCEDURE — 6370000000 HC RX 637 (ALT 250 FOR IP)

## 2023-03-29 PROCEDURE — 6360000002 HC RX W HCPCS: Performed by: NURSE PRACTITIONER

## 2023-03-29 PROCEDURE — 2500000003 HC RX 250 WO HCPCS: Performed by: NURSE PRACTITIONER

## 2023-03-29 PROCEDURE — 2580000003 HC RX 258: Performed by: ANESTHESIOLOGY

## 2023-03-29 PROCEDURE — 6360000002 HC RX W HCPCS

## 2023-03-29 PROCEDURE — 1100000000 HC RM PRIVATE

## 2023-03-29 RX ORDER — DOCUSATE SODIUM 100 MG/1
100 CAPSULE, LIQUID FILLED ORAL 2 TIMES DAILY
Status: DISCONTINUED | OUTPATIENT
Start: 2023-03-30 | End: 2023-04-01 | Stop reason: HOSPADM

## 2023-03-29 RX ORDER — ALBUMIN, HUMAN INJ 5% 5 %
25 SOLUTION INTRAVENOUS ONCE
Status: COMPLETED | OUTPATIENT
Start: 2023-03-29 | End: 2023-03-29

## 2023-03-29 RX ORDER — METHOCARBAMOL 750 MG/1
750 TABLET, FILM COATED ORAL 3 TIMES DAILY
Status: DISCONTINUED | OUTPATIENT
Start: 2023-03-29 | End: 2023-04-01 | Stop reason: HOSPADM

## 2023-03-29 RX ORDER — ATORVASTATIN CALCIUM 40 MG/1
40 TABLET, FILM COATED ORAL DAILY
Status: DISCONTINUED | OUTPATIENT
Start: 2023-03-29 | End: 2023-03-30

## 2023-03-29 RX ORDER — SODIUM CHLORIDE, SODIUM LACTATE, POTASSIUM CHLORIDE, CALCIUM CHLORIDE 600; 310; 30; 20 MG/100ML; MG/100ML; MG/100ML; MG/100ML
INJECTION, SOLUTION INTRAVENOUS CONTINUOUS
Status: DISCONTINUED | OUTPATIENT
Start: 2023-03-29 | End: 2023-04-01

## 2023-03-29 RX ORDER — DIMETHICONE, CAMPHOR (SYNTHETIC), MENTHOL, AND PHENOL 1.1; .5; .625; .5 G/100G; G/100G; G/100G; G/100G
OINTMENT TOPICAL PRN
Status: DISCONTINUED | OUTPATIENT
Start: 2023-03-29 | End: 2023-04-01 | Stop reason: HOSPADM

## 2023-03-29 RX ADMIN — HYDROMORPHONE HYDROCHLORIDE 0.5 MG: 1 INJECTION, SOLUTION INTRAMUSCULAR; INTRAVENOUS; SUBCUTANEOUS at 17:48

## 2023-03-29 RX ADMIN — MORPHINE SULFATE 2 MG: 2 INJECTION, SOLUTION INTRAMUSCULAR; INTRAVENOUS at 14:06

## 2023-03-29 RX ADMIN — METHOCARBAMOL TABLETS 750 MG: 750 TABLET, COATED ORAL at 20:21

## 2023-03-29 RX ADMIN — MORPHINE SULFATE 2 MG: 2 INJECTION, SOLUTION INTRAMUSCULAR; INTRAVENOUS at 04:24

## 2023-03-29 RX ADMIN — SODIUM CHLORIDE, POTASSIUM CHLORIDE, SODIUM LACTATE AND CALCIUM CHLORIDE: 600; 310; 30; 20 INJECTION, SOLUTION INTRAVENOUS at 13:12

## 2023-03-29 RX ADMIN — SODIUM CHLORIDE, PRESERVATIVE FREE 10 ML: 5 INJECTION INTRAVENOUS at 20:21

## 2023-03-29 RX ADMIN — MORPHINE SULFATE 2 MG: 2 INJECTION, SOLUTION INTRAMUSCULAR; INTRAVENOUS at 22:19

## 2023-03-29 RX ADMIN — HYDROMORPHONE HYDROCHLORIDE 0.5 MG: 1 INJECTION, SOLUTION INTRAMUSCULAR; INTRAVENOUS; SUBCUTANEOUS at 08:55

## 2023-03-29 RX ADMIN — SODIUM CHLORIDE, PRESERVATIVE FREE 10 ML: 5 INJECTION INTRAVENOUS at 08:26

## 2023-03-29 RX ADMIN — ENOXAPARIN SODIUM 40 MG: 100 INJECTION SUBCUTANEOUS at 20:20

## 2023-03-29 RX ADMIN — SODIUM CHLORIDE: 9 INJECTION, SOLUTION INTRAVENOUS at 08:25

## 2023-03-29 RX ADMIN — HYDROMORPHONE HYDROCHLORIDE 0.5 MG: 1 INJECTION, SOLUTION INTRAMUSCULAR; INTRAVENOUS; SUBCUTANEOUS at 02:18

## 2023-03-29 RX ADMIN — ALBUMIN (HUMAN) 25 G: 12.5 INJECTION, SOLUTION INTRAVENOUS at 15:17

## 2023-03-29 RX ADMIN — FAMOTIDINE 20 MG: 10 INJECTION INTRAVENOUS at 08:26

## 2023-03-29 RX ADMIN — METOPROLOL TARTRATE 2.5 MG: 5 INJECTION, SOLUTION INTRAVENOUS at 20:20

## 2023-03-29 RX ADMIN — METOPROLOL TARTRATE 2.5 MG: 5 INJECTION, SOLUTION INTRAVENOUS at 11:24

## 2023-03-29 RX ADMIN — METOPROLOL TARTRATE 2.5 MG: 5 INJECTION, SOLUTION INTRAVENOUS at 04:24

## 2023-03-29 RX ADMIN — SODIUM CHLORIDE, POTASSIUM CHLORIDE, SODIUM LACTATE AND CALCIUM CHLORIDE: 600; 310; 30; 20 INJECTION, SOLUTION INTRAVENOUS at 22:20

## 2023-03-29 ASSESSMENT — PAIN DESCRIPTION - ORIENTATION
ORIENTATION: ANTERIOR
ORIENTATION: ANTERIOR
ORIENTATION: ANTERIOR;MID

## 2023-03-29 ASSESSMENT — PAIN DESCRIPTION - FREQUENCY
FREQUENCY: INTERMITTENT

## 2023-03-29 ASSESSMENT — PAIN DESCRIPTION - ONSET
ONSET: ON-GOING

## 2023-03-29 ASSESSMENT — PAIN SCALES - GENERAL
PAINLEVEL_OUTOF10: 8
PAINLEVEL_OUTOF10: 3
PAINLEVEL_OUTOF10: 6
PAINLEVEL_OUTOF10: 6
PAINLEVEL_OUTOF10: 10
PAINLEVEL_OUTOF10: 3
PAINLEVEL_OUTOF10: 0
PAINLEVEL_OUTOF10: 2
PAINLEVEL_OUTOF10: 0
PAINLEVEL_OUTOF10: 6

## 2023-03-29 ASSESSMENT — PAIN DESCRIPTION - LOCATION
LOCATION: ABDOMEN;INCISION
LOCATION: ABDOMEN

## 2023-03-29 ASSESSMENT — PAIN DESCRIPTION - DESCRIPTORS
DESCRIPTORS: PRESSURE;CRAMPING
DESCRIPTORS: SHARP

## 2023-03-29 ASSESSMENT — PAIN - FUNCTIONAL ASSESSMENT
PAIN_FUNCTIONAL_ASSESSMENT: PREVENTS OR INTERFERES SOME ACTIVE ACTIVITIES AND ADLS
PAIN_FUNCTIONAL_ASSESSMENT: ACTIVITIES ARE NOT PREVENTED

## 2023-03-29 ASSESSMENT — PAIN DESCRIPTION - PAIN TYPE
TYPE: SURGICAL PAIN

## 2023-03-29 NOTE — PROGRESS NOTES
END OF SHIFT NOTE:    INTAKE/OUTPUT  03/28 0701 - 03/29 0700  In: 3522.7 [I.V.:3522.7]  Out: 2965 [Urine:725; Drains:40]  Voiding: Yes  Catheter: No  Drain:   Closed/Suction Drain Superior;Midline Abdomen Bulb (Active)   Site Description Clean, dry & intact 03/29/23 1359   Dressing Status Clean, dry & intact 03/29/23 1359   Drainage Appearance Bloody 03/29/23 1359   Drain Status Compressed; To bulb suction 03/29/23 1359   Output (ml) 15 ml 03/29/23 1359               Flatus: Patient does have flatus present. Stool: 0 occurrences. Characteristics:     Emesis: 0 occurrences. Characteristics:       VITAL SIGNS  Patient Vitals for the past 12 hrs:   Temp Pulse Resp BP SpO2   03/29/23 1459 97.9 °F (36.6 °C) (!) 101 18 116/78 100 %   03/29/23 1058 97.7 °F (36.5 °C) 94 18 130/81 94 %   03/29/23 0720 97.3 °F (36.3 °C) 97 18 121/84 92 %       Pain Assessment  Pain Level: 10 (03/29/23 7833)  Pain Location: Abdomen  Patient's Stated Pain Goal: 0 - No pain    Ambulating  Yes    Shift report given to oncoming nurse at the bedside.     Sudheer Mcallister RN

## 2023-03-29 NOTE — PROGRESS NOTES
ACUTE PHYSICAL THERAPY GOALS:   (Developed with and agreed upon by patient and/or caregiver. )  LTG:  (1.)Mr. Justyn De La Fuente will move from supine to sit and sit to supine , scoot up and down, and roll side to side in bed with INDEPENDENCE within 7-10 treatment day(s). (2.)Mr. Justyn De La Fuente will transfer from bed to chair and chair to bed with INDEPENDENCE using the least restrictive device within 7-10 treatment day(s). (3.)Mr. Justyn De La Fuente will ambulate with MODIFIED INDEPENDENCE for 500 feet with the least restrictive device within 7-10 treatment day(s). (4.)Mr. Justyn De La Fuente will perform static/dynamic standing balance exercises for 10 minutes with SUPERVISION in order to improve functional mobility within 7-10 treatment day(s). ________________________________________________________________________________________________      PHYSICAL THERAPY Initial Assessment, Daily Note, and PM  (Link to Caseload Tracking: PT Visit Days : 1  Acknowledge Orders  Time In/Out  PT Charge Capture  Rehab Caseload Tracker    Ene Thayer is a 78 y.o. male   PRIMARY DIAGNOSIS: SBO (small bowel obstruction) ([facility])  [facility]BO (small bowel obstruction) ([facility] [K56.609]  Small bowel obstruction due to postoperative adhesions [K91.30]  Procedure(s) (LRB):  LAPAROSCOPY DIAGNOSTIC, converted to open (N/A)  1 Day Post-Op  Reason for Referral: Difficulty in walking, Not elsewhere classified (R26.2)  Other abnormalities of gait and mobility (R26.89)  Generalized Muscle Weakness (M62.81)  In[place] / Product Type: *No Product type* /     ASSESSMENT:     REHAB RECOMMENDATIONS:   Recommendation to date pending progress:  Setting:  Home Health Therapy    Equipment:    Rolling Walker     ASSESSMENT:  Mr. Justyn De La Fuente eventually agreed to try to move around the room a little today (states he has already been up today).  He required moderate assist to get upright from a supine position, then was able to ambulate about 10 feet around COGNITION/  PERCEPTION: Intact Impaired (Comments):   Orientation [x]     Vision [x]     Hearing [x]     Cognition  [x]       MOBILITY: I Mod I S SBA CGA Min Mod Max Total  NT x2 Comments:   Bed Mobility    Rolling [] [] [] [] [] [x] [] [] [] [] []    Supine to Sit [] [] [] [] [] [x] [x] [] [] [] [] To obtain upright   Scooting [] [] [] [] [] [] [] [] [] [x] []    Sit to Supine [] [] [] [] [] [x] [] [] [] [] []    Transfers    Sit to Stand [] [] [] [] [] [x] [] [] [] [] [] Cues to push up from bed   Bed to Chair [] [] [] [] [] [] [] [] [] [x] []    Stand to Sit [] [] [] [] [] [x] [] [] [] [] [] Cues to reach back for bed    [] [] [] [] [] [] [] [] [] [] []    I=Independent, Mod I=Modified Independent, S=Supervision, SBA=Standby Assistance, CGA=Contact Guard Assistance,   Min=Minimal Assistance, Mod=Moderate Assistance, Max=Maximal Assistance, Total=Total Assistance, NT=Not Tested    GAIT: I Mod I S SBA CGA Min Mod Max Total  NT x2 Comments:   Level of Assistance [] [] [] [] [] [] [] [] [] [] [] Cues for longer steps, upright posture, safety   Distance 10 Feet (around bed)    DME Hand held assist    Gait Quality Altered arm swing, Decreased step clearance, Decreased step length, Narrow base of support, Path deviations , Trunk flexion, and Trunk sway increased    Weightbearing Status      Stairs      I=Independent, Mod I=Modified Independent, S=Supervision, SBA=Standby Assistance, CGA=Contact Guard Assistance,   Min=Minimal Assistance, Mod=Moderate Assistance, Max=Maximal Assistance, Total=Total Assistance, NT=Not Tested    PLAN:   FREQUENCY AND DURATION: 3 times/week for duration of hospital stay or until stated goals are met, whichever comes first.    THERAPY PROGNOSIS: Fair    PROBLEM LIST:   (Skilled intervention is medically necessary to address:)  Decreased ADL/Functional Activities  Decreased Activity Tolerance  Decreased AROM/PROM  Decreased Balance  Decreased Gait Ability  Decreased Safety

## 2023-03-29 NOTE — PROGRESS NOTES
Unable to assess  Last BM (including prior to admit): 03/26/23    Emesis: 0 occurrences. Characteristics:   Emesis Appearance: [address]  Patient Vitals for the past 12 hrs:   Temp Pulse Resp BP SpO2   03/29/23 0454 -- -- 16 -- --   03/29/23 0424 -- -- 18 -- --   03/29/23 0407 98.4 °F (36.9 °C) 100 17 126/85 94 %   03/29/23 0248 -- -- 18 -- --   03/29/23 0218 -- -- 16 -- --   03/29/23 0200 -- 96 -- -- --   03/28/23 2341 98.1 °F (36.7 °C) 96 18 130/79 99 %   03/28/23 2323 -- -- 16 -- --   03/28/23 2253 -- -- 18 -- --   03/28/23 2032 -- -- 16 -- --   03/28/23 2014 97.9 °F (36.6 °C) (!) 112 18 128/81 99 %   03/28/23 2002 -- -- 18 -- --   03/28/23 1935 -- (!) 110 16 -- --   03/28/23 1827 98.2 °F (36.8 °C) (!) 108 18 127/78 95 %       Pain Assessment  Pain Level: 0 (03/29/23 0454)  Pain Location: Abdomen  Patient's Stated Pain Goal: 0 - No pain    Ambulating  No    Shift report given to oncoming nurse at the bedside.     Pan Ding, RN

## 2023-03-29 NOTE — PROGRESS NOTES
Admit Date: 3/25/2023    POD1: Diagnostic laparoscopy, converted to exploratory laparotomy, segmental small bowel resection, reduction of internal hernia, explantation of mesh     Surgeon: Corinna Lauren DO 3/24/23    Subjective:     Patient alert and oriented x4 with NAD noted. Respirations even and unlabored on room air. NGT LIS in place with green output. Abdominal pain is well controlled with as needed IV pain medicine. Patient endorses flatus. Patient denies N or V. Santa in place    Objective:       Vitals:    23 0424 23 0454 23 0720 23 1058   BP:   121/84 130/81   Pulse:   97 94   Resp: 18 16 18 18   Temp:   97.3 °F (36.3 °C) 97.7 °F (36.5 °C)   TempSrc:   Axillary Axillary   SpO2:   92% 94%   Weight:       Height:           Temp (24hrs), Av.9 °F (36.6 °C), Min:97.1 °F (36.2 °C), Max:98.4 °F (36.9 °C)  . I&O reviewed as documented. NGT 2200 green output past 24 hours with 600 mL output overnight  Mid abdomen AINYA drain 40 mL SS output past 24 hours  725 UOP past 24 hours-Santa  Positive flatus 3/29/2023  VSS since 23:41 3/28/2023    Physical Exam  Constitutional:       General: He is not in acute distress. Appearance: Normal appearance. HENT:      Nose:      Comments: NGT      Mouth/Throat:      Mouth: Mucous membranes are dry. Cardiovascular:      Rate and Rhythm: Normal rate and regular rhythm. Pulses: Normal pulses. Heart sounds: Normal heart sounds. No murmur heard. No friction rub. No gallop. Pulmonary:      Effort: Pulmonary effort is normal. No respiratory distress. Breath sounds: Normal breath sounds. Abdominal:      General: Bowel sounds are normal. There is no distension. Palpations: Abdomen is soft. Genitourinary:     Comments: Santa   Musculoskeletal:         General: No swelling. Normal range of motion. Cervical back: No rigidity. Skin:     General: Skin is warm and dry.       Capillary Refill: Capillary refill takes less than 2 seconds. Comments: Midline abdominal incision with dressing C/d/I with some shadowing but no breakthrough with no signs of infection. Mid abdomen ANIYA drain with no signs of infection   Neurological:      Mental Status: He is oriented to person, place, and time. Psychiatric:         Behavior: Behavior normal.      Labs:   Recent Results (from the past 24 hour(s))   TYPE AND SCREEN    Collection Time: 23  1:34 PM   Result Value Ref Range    Crossmatch expiration date 2023,2359     ABO/Rh O POSITIVE     Antibody Screen NEG          Assessment:     Principal Problem:    SBO (small bowel obstruction) ([facility])  Resolved Problems:    * No resolved hospital problems. *        Plan/Recommendations/Medical Decision Makin g albumin and 500 mL  4-hour NGT clamp trial of clear liquids  Increase  ml/hr  CBC and BMP tomorrow a.m.   D/C Kiet  PT eval and treat    Cy FRANCISCO Miller - NP

## 2023-03-30 ENCOUNTER — APPOINTMENT (OUTPATIENT)
Dept: GENERAL RADIOLOGY | Age: 68
End: 2023-03-30
Payer: MEDICAID

## 2023-03-30 ENCOUNTER — TELEPHONE (OUTPATIENT)
Dept: INTERNAL MEDICINE CLINIC | Facility: CLINIC | Age: 68
End: 2023-03-30

## 2023-03-30 LAB
ANION GAP SERPL CALC-SCNC: 3 MMOL/L (ref 2–11)
BASOPHILS # BLD: 0 K/UL (ref 0–0.2)
BASOPHILS NFR BLD: 0 % (ref 0–2)
BUN SERPL-MCNC: 39 MG/DL (ref 8–23)
CALCIUM SERPL-MCNC: 8.5 MG/DL (ref 8.3–10.4)
CHLORIDE SERPL-SCNC: 109 MMOL/L (ref 101–110)
CO2 SERPL-SCNC: 28 MMOL/L (ref 21–32)
CREAT SERPL-MCNC: 1.3 MG/DL (ref 0.8–1.5)
DIFFERENTIAL METHOD BLD: ABNORMAL
EOSINOPHIL # BLD: 0 K/UL (ref 0–0.8)
EOSINOPHIL NFR BLD: 0 % (ref 0.5–7.8)
ERYTHROCYTE [DISTWIDTH] IN BLOOD BY AUTOMATED COUNT: 12.5 % (ref 11.9–14.6)
GLUCOSE SERPL-MCNC: 129 MG/DL (ref 65–100)
HCT VFR BLD AUTO: 33.8 % (ref 41.1–50.3)
HGB BLD-MCNC: 11.3 G/DL (ref 13.6–17.2)
IMM GRANULOCYTES # BLD AUTO: 0.1 K/UL (ref 0–0.5)
IMM GRANULOCYTES NFR BLD AUTO: 1 % (ref 0–5)
LYMPHOCYTES # BLD: 1.5 K/UL (ref 0.5–4.6)
LYMPHOCYTES NFR BLD: 17 % (ref 13–44)
MCH RBC QN AUTO: 30.1 PG (ref 26.1–32.9)
MCHC RBC AUTO-ENTMCNC: 33.4 G/DL (ref 31.4–35)
MCV RBC AUTO: 89.9 FL (ref 82–102)
MONOCYTES # BLD: 0.6 K/UL (ref 0.1–1.3)
MONOCYTES NFR BLD: 7 % (ref 4–12)
NEUTS SEG # BLD: 6.6 K/UL (ref 1.7–8.2)
NEUTS SEG NFR BLD: 75 % (ref 43–78)
NRBC # BLD: 0 K/UL (ref 0–0.2)
PLATELET # BLD AUTO: 336 K/UL (ref 150–450)
PMV BLD AUTO: 9.3 FL (ref 9.4–12.3)
POTASSIUM SERPL-SCNC: 3.7 MMOL/L (ref 3.5–5.1)
RBC # BLD AUTO: 3.76 M/UL (ref 4.23–5.6)
SODIUM SERPL-SCNC: 140 MMOL/L (ref 133–143)
WBC # BLD AUTO: 8.8 K/UL (ref 4.3–11.1)

## 2023-03-30 PROCEDURE — 6360000002 HC RX W HCPCS

## 2023-03-30 PROCEDURE — 36415 COLL VENOUS BLD VENIPUNCTURE: CPT

## 2023-03-30 PROCEDURE — 6370000000 HC RX 637 (ALT 250 FOR IP): Performed by: INTERNAL MEDICINE

## 2023-03-30 PROCEDURE — 80048 BASIC METABOLIC PNL TOTAL CA: CPT

## 2023-03-30 PROCEDURE — 1100000000 HC RM PRIVATE

## 2023-03-30 PROCEDURE — 2700000000 HC OXYGEN THERAPY PER DAY

## 2023-03-30 PROCEDURE — 2580000003 HC RX 258: Performed by: NURSE PRACTITIONER

## 2023-03-30 PROCEDURE — 94640 AIRWAY INHALATION TREATMENT: CPT

## 2023-03-30 PROCEDURE — 6370000000 HC RX 637 (ALT 250 FOR IP)

## 2023-03-30 PROCEDURE — 2500000003 HC RX 250 WO HCPCS: Performed by: STUDENT IN AN ORGANIZED HEALTH CARE EDUCATION/TRAINING PROGRAM

## 2023-03-30 PROCEDURE — 94760 N-INVAS EAR/PLS OXIMETRY 1: CPT

## 2023-03-30 PROCEDURE — 2580000003 HC RX 258

## 2023-03-30 PROCEDURE — 6360000002 HC RX W HCPCS: Performed by: NURSE PRACTITIONER

## 2023-03-30 PROCEDURE — 6360000002 HC RX W HCPCS: Performed by: STUDENT IN AN ORGANIZED HEALTH CARE EDUCATION/TRAINING PROGRAM

## 2023-03-30 PROCEDURE — 85025 COMPLETE CBC W/AUTO DIFF WBC: CPT

## 2023-03-30 PROCEDURE — 71045 X-RAY EXAM CHEST 1 VIEW: CPT

## 2023-03-30 PROCEDURE — 6370000000 HC RX 637 (ALT 250 FOR IP): Performed by: NURSE PRACTITIONER

## 2023-03-30 PROCEDURE — 6370000000 HC RX 637 (ALT 250 FOR IP): Performed by: STUDENT IN AN ORGANIZED HEALTH CARE EDUCATION/TRAINING PROGRAM

## 2023-03-30 RX ORDER — SODIUM CHLORIDE, SODIUM LACTATE, POTASSIUM CHLORIDE, AND CALCIUM CHLORIDE .6; .31; .03; .02 G/100ML; G/100ML; G/100ML; G/100ML
500 INJECTION, SOLUTION INTRAVENOUS ONCE
Status: COMPLETED | OUTPATIENT
Start: 2023-03-30 | End: 2023-03-30

## 2023-03-30 RX ORDER — CHLORPROMAZINE HYDROCHLORIDE 25 MG/1
25 TABLET, FILM COATED ORAL ONCE
Status: COMPLETED | OUTPATIENT
Start: 2023-03-30 | End: 2023-03-30

## 2023-03-30 RX ORDER — FAMOTIDINE 20 MG/1
20 TABLET, FILM COATED ORAL DAILY
Status: DISCONTINUED | OUTPATIENT
Start: 2023-03-30 | End: 2023-04-01 | Stop reason: HOSPADM

## 2023-03-30 RX ORDER — BACLOFEN 10 MG/1
10 TABLET ORAL ONCE
Status: COMPLETED | OUTPATIENT
Start: 2023-03-30 | End: 2023-03-30

## 2023-03-30 RX ORDER — IPRATROPIUM BROMIDE AND ALBUTEROL SULFATE 2.5; .5 MG/3ML; MG/3ML
1 SOLUTION RESPIRATORY (INHALATION) 4 TIMES DAILY
Status: DISCONTINUED | OUTPATIENT
Start: 2023-03-30 | End: 2023-04-01 | Stop reason: HOSPADM

## 2023-03-30 RX ORDER — METOCLOPRAMIDE HYDROCHLORIDE 5 MG/ML
5 INJECTION INTRAMUSCULAR; INTRAVENOUS EVERY 6 HOURS
Status: DISCONTINUED | OUTPATIENT
Start: 2023-03-30 | End: 2023-04-01 | Stop reason: HOSPADM

## 2023-03-30 RX ADMIN — PROCHLORPERAZINE EDISYLATE 10 MG: 5 INJECTION INTRAMUSCULAR; INTRAVENOUS at 06:30

## 2023-03-30 RX ADMIN — METOCLOPRAMIDE 5 MG: 5 INJECTION, SOLUTION INTRAMUSCULAR; INTRAVENOUS at 16:11

## 2023-03-30 RX ADMIN — METHOCARBAMOL TABLETS 750 MG: 750 TABLET, COATED ORAL at 14:44

## 2023-03-30 RX ADMIN — METHOCARBAMOL TABLETS 750 MG: 750 TABLET, COATED ORAL at 08:04

## 2023-03-30 RX ADMIN — DOCUSATE SODIUM 100 MG: 100 CAPSULE, LIQUID FILLED ORAL at 21:36

## 2023-03-30 RX ADMIN — DOCUSATE SODIUM 100 MG: 100 CAPSULE, LIQUID FILLED ORAL at 08:04

## 2023-03-30 RX ADMIN — ENOXAPARIN SODIUM 40 MG: 100 INJECTION SUBCUTANEOUS at 21:36

## 2023-03-30 RX ADMIN — SODIUM CHLORIDE, POTASSIUM CHLORIDE, SODIUM LACTATE AND CALCIUM CHLORIDE: 600; 310; 30; 20 INJECTION, SOLUTION INTRAVENOUS at 09:44

## 2023-03-30 RX ADMIN — IPRATROPIUM BROMIDE AND ALBUTEROL SULFATE 1 AMPULE: .5; 3 SOLUTION RESPIRATORY (INHALATION) at 19:49

## 2023-03-30 RX ADMIN — METOPROLOL TARTRATE 2.5 MG: 5 INJECTION, SOLUTION INTRAVENOUS at 14:44

## 2023-03-30 RX ADMIN — METOPROLOL TARTRATE 2.5 MG: 5 INJECTION, SOLUTION INTRAVENOUS at 04:38

## 2023-03-30 RX ADMIN — METOCLOPRAMIDE 5 MG: 5 INJECTION, SOLUTION INTRAMUSCULAR; INTRAVENOUS at 21:36

## 2023-03-30 RX ADMIN — HYDROMORPHONE HYDROCHLORIDE 0.5 MG: 1 INJECTION, SOLUTION INTRAMUSCULAR; INTRAVENOUS; SUBCUTANEOUS at 01:43

## 2023-03-30 RX ADMIN — SODIUM CHLORIDE, POTASSIUM CHLORIDE, SODIUM LACTATE AND CALCIUM CHLORIDE: 600; 310; 30; 20 INJECTION, SOLUTION INTRAVENOUS at 17:38

## 2023-03-30 RX ADMIN — METOPROLOL TARTRATE 25 MG: 25 TABLET, FILM COATED ORAL at 21:36

## 2023-03-30 RX ADMIN — SODIUM CHLORIDE, POTASSIUM CHLORIDE, SODIUM LACTATE AND CALCIUM CHLORIDE 500 ML: 600; 310; 30; 20 INJECTION, SOLUTION INTRAVENOUS at 08:07

## 2023-03-30 RX ADMIN — CHLORPROMAZINE HYDROCHLORIDE 25 MG: 25 TABLET, FILM COATED ORAL at 01:43

## 2023-03-30 RX ADMIN — FAMOTIDINE 20 MG: 20 TABLET, FILM COATED ORAL at 08:04

## 2023-03-30 RX ADMIN — METHOCARBAMOL TABLETS 750 MG: 750 TABLET, COATED ORAL at 21:36

## 2023-03-30 RX ADMIN — METOCLOPRAMIDE 5 MG: 5 INJECTION, SOLUTION INTRAMUSCULAR; INTRAVENOUS at 11:05

## 2023-03-30 RX ADMIN — ONDANSETRON 4 MG: 2 INJECTION INTRAMUSCULAR; INTRAVENOUS at 11:10

## 2023-03-30 RX ADMIN — MORPHINE SULFATE 2 MG: 2 INJECTION, SOLUTION INTRAMUSCULAR; INTRAVENOUS at 11:10

## 2023-03-30 RX ADMIN — BACLOFEN 10 MG: 10 TABLET ORAL at 04:38

## 2023-03-30 ASSESSMENT — PAIN SCALES - WONG BAKER: WONGBAKER_NUMERICALRESPONSE: 0

## 2023-03-30 ASSESSMENT — PAIN SCALES - GENERAL
PAINLEVEL_OUTOF10: 0
PAINLEVEL_OUTOF10: 6
PAINLEVEL_OUTOF10: 0
PAINLEVEL_OUTOF10: 2
PAINLEVEL_OUTOF10: 8

## 2023-03-30 ASSESSMENT — PAIN DESCRIPTION - DESCRIPTORS: DESCRIPTORS: SHARP

## 2023-03-30 ASSESSMENT — PAIN DESCRIPTION - ORIENTATION: ORIENTATION: ANTERIOR

## 2023-03-30 ASSESSMENT — PAIN DESCRIPTION - LOCATION
LOCATION: ABDOMEN
LOCATION: ABDOMEN

## 2023-03-30 NOTE — PROGRESS NOTES
2209: Pt has LR @ 100ml/hr continuous but order states PACU only. A note from General surgery NP from morning rounds 03/29 states to increase LR to 125ml/hr but there is no active order. NP notified for clarification & new order acknowledge to increase LR to 125ml/hr until discontinued.

## 2023-03-30 NOTE — PROGRESS NOTES
Admit Date: 3/25/2023    POD2: Diagnostic laparoscopy, converted to exploratory laparotomy, segmental small bowel resection, reduction of internal hernia, explantation of mesh     Surgeon: Bismark Friend DO 3/24/23    Subjective:     Patient alert and oriented x4 with NAD noted. Respirations even and unlabored on room air. NGT removed yesterday evening 3/29/2023 as patient tolerated clear liquid trial with no nausea or vomiting. Abdominal pain is well controlled with as needed pain medication  Patient endorses some nausea and abdomen is mildly distended = increased distention versus yesterday 3/29/2023. Voiding. Endorses flatus 3/30/2023    Objective:       Vitals:    23 0143 23 0213 23 0407 23 0829   BP:   (!) 140/93 (!) 151/89   Pulse: 98  93 (!) 101   Resp: 18 16 17 17   Temp:   98.2 °F (36.8 °C) 98.1 °F (36.7 °C)   TempSrc:   Oral    SpO2: 94%  94% 92%   Weight:       Height:           Temp (24hrs), Av.5 °F (36.9 °C), Min:97.9 °F (36.6 °C), Max:99.7 °F (37.6 °C)  . I&O reviewed as documented. Mid abdomen ANIYA drain 57 mL SS output past 24 hours   900 ml UOP past 24 hours-Santa  Positive flatus 3/29/2023      Physical Exam  Constitutional:       General: He is not in acute distress. Appearance: Normal appearance. HENT:      Nose:      Comments:       Mouth/Throat:      Mouth: Mucous membranes are moist.   Cardiovascular:      Rate and Rhythm: Normal rate and regular rhythm. Pulses: Normal pulses. Heart sounds: Normal heart sounds. No murmur heard. No friction rub. No gallop. Pulmonary:      Effort: Pulmonary effort is normal. No respiratory distress. Breath sounds: Normal breath sounds. Abdominal:      General: Bowel sounds are normal. There is distension. Palpations: Abdomen is soft.       Comments: Mild abdominal distention and some nausea with active bowel sounds all quadrants and passing flatus   Genitourinary:     Comments:

## 2023-03-30 NOTE — PROGRESS NOTES
END OF SHIFT NOTE:    Tried multiple times for pt to get up OOB & walk, pt refused. Refused all meals. INTAKE/OUTPUT  03/29 0701 - 03/30 0700  In: 3076.8 [P.O.:360; I.V.:2716.8]  Out: 1507.5 [Urine:900; Drains:57.5]  Voiding: Yes  Catheter: No  Drain:   Closed/Suction Drain Superior;Midline Abdomen Bulb (Active)   Site Description Clean, dry & intact 03/30/23 1343   Dressing Status Clean, dry & intact 03/30/23 1343   Drainage Appearance Serosanguinous 03/30/23 1343   Drain Status Compressed; To bulb suction 03/30/23 1343   Output (ml) 7.5 ml 03/30/23 1343               Flatus: Patient does have flatus present. Stool: 0 occurrences. Characteristics:    Emesis: 0 occurrences. Characteristics:       VITAL SIGNS  Patient Vitals for the past 12 hrs:   Temp Pulse Resp BP SpO2   03/30/23 1635 97.7 °F (36.5 °C) 81 17 (!) 153/89 (!) 88 %   03/30/23 1444 -- 97 -- 131/76 --   03/30/23 1300 -- -- -- -- 95 %   03/30/23 1242 98.2 °F (36.8 °C) 90 18 127/78 (!) 88 %   03/30/23 0829 98.1 °F (36.7 °C) (!) 101 17 (!) 151/89 92 %       Pain Assessment  Pain Level: 0 (03/30/23 1911)  Pain Location: Abdomen  Patient's Stated Pain Goal: 0 - No pain    Ambulating  Refused       Shift report given to oncoming nurse at the bedside.     Mariana Mehta RN

## 2023-03-30 NOTE — PROGRESS NOTES
0128: Pt complaining of constant hiccups. States \"I feel like they are getting worse. \" Sips of water did not help per pt. NP notified & new order acknowledged for thorazine 25mg PO x1.     0256: Thorazine was given @ 0146 but pt states no relief of hiccups. States \"the hiccups make me gasp for air every once in a while. \" New order acknowledged for baclofen 10mg PO x1.     7971: Pt states \"the medicine ain't working. I can't keep doing this, I'm miserable. \" NP notified of baclofen not working & pt tried more sips of water with no luck of getting rid of hiccups. No new orders acknowledged at this time. 5620: Pt states \"these hiccups are making me nauseous now. I don't know what to do. \" PRN compazine given for nausea & hiccups.

## 2023-03-30 NOTE — TELEPHONE ENCOUNTER
I called and lvm for patient to return my call about his labs .  I also sent a letter to the patient in the mail

## 2023-03-30 NOTE — PROGRESS NOTES
Basic Metabolic Panel w/ Reflex to MG    Collection Time: 03/30/23  5:29 AM   Result Value Ref Range    Sodium 140 133 - 143 mmol/L    Potassium 3.7 3.5 - 5.1 mmol/L    Chloride 109 101 - 110 mmol/L    CO2 28 21 - 32 mmol/L    Anion Gap 3 2 - 11 mmol/L    Glucose 129 (H) 65 - 100 mg/dL    BUN 39 (H) 8 - 23 MG/DL    Creatinine 1.30 0.8 - 1.5 MG/DL    Est, Glom Filt Rate >60 >60 ml/min/1.73m2    Calcium 8.5 8.3 - 10.4 MG/DL         I have personally reviewed imaging studies showing:  XR ABDOMEN (KUB) (SINGLE AP VIEW)    Result Date: 3/26/2023  KUB Clinical indications: Evaluate NG tube FINDINGS: Single supine view of the upper abdomen shows the NG tube to be in satisfactory position the left upper quadrant. Bowel gas pattern is unremarkable. NG tube in satisfactory position. CT ABDOMEN PELVIS W IV CONTRAST Additional Contrast? Oral    Result Date: 3/26/2023  EXAMINATION: CT SCAN OF THE ABDOMEN AND PELVIS WITH INTRAVENOUS CONTRAST DATE OF EXAM: 3/26/2023 12:05 AM HISTORY: 1 week post ventral hernia repair. He is now having lower abdominal pain and vomiting COMPARISON: April 21, 2018. TECHNIQUE: CT examination of the abdomen and pelvis was performed following the intravenous administration of 100 mL  Isovue-370. CT dose lowering techniques were used, to include: automated exposure control, adjustment for patient size, and/or use of iterative reconstruction. FINDINGS: ABDOMEN/PELVIS: Lower Chest: Mild bibasilar atelectasis again identified. Liver: A few scattered hepatic hypodensities again identified likely small cysts. No concerning liver abnormality. Gallbladder/Biliary: Normal. Pancreas: Normal. Spleen: Normal. Adrenal Glands: Normal. Kidneys: Small cyst superior pole right kidney again identified. No hydronephrosis. GI Tract: Stomach is moderately dilated. Multiple loops of significantly distended small bowel identified measuring up to 4.3 cm.  Abrupt transition in the right mid abdomen as seen on series 2 image 65. Some mesenteric swirling at transition point. Bowel distal to the transition is entirely collapsed. Normal bowel wall enhancement. Mesentery/Peritoneum: Normal. Vasculature: Atherosclerotic plaque no aneurysm. Lymph Nodes: Normal. Abdominal Wall: Previously identified large midline ventral hernia has been repaired. Small seroma remains measuring 7.1 x 1.6 x 5.6 cm. Bladder: Normal. Reproductive: Normal. Musculoskeletal: Mild anterolisthesis of L4 in relation to L5 secondary to facet  arthropathy. 1.  High-grade small bowel obstruction with transition point in the right mid abdomen likely secondary to adhesion. 2.  Interval repair of large ventral hernia with postoperative anterior abdominal wall fluid collection that most likely represents a seroma. Roslyn Banks M.D., [facility] 3/26/2023 12:32:00 AM      Echocardiogram:  No results found for this or any previous visit. Signed:  Ebony Nayak MD    Part of this note may have been written by using a voice dictation software. The note has been proof read but may still contain some grammatical/other typographical errors.

## 2023-03-30 NOTE — PROGRESS NOTES
Physician Progress Note      Keily Payne  CSN #:                  [id number]  :                       1955  ADMIT DATE:       3/25/2023 10:27 PM  [address] [place] DATE:  RESPONDING  PROVIDER #:        Lianna Tam MD          QUERY TEXT:    Patient admitted with SBO. Noted documentation of Acute Kidney Injury in   medical record. In order to support the diagnosis of SARI, please include   additional clinical indicators in your documentation. ? Or please document if   the diagnosis of SARI has been ruled out after further study. The medical record reflects the following:  Risk Factors: SBO, vomiting  Clinical Indicators: Cr 1.80 on admit improved to 1.50. Documented baseline   of 1.5  Treatment: CRUZ [email or web address]    Defined by Kidney Disease Improving Global Outcomes (KDIGO) clinical practice   guideline for acute kidney injury:  -Increase in SCr by greater than or equal to 0.3 mg/dl within 48 hours; or  -Increase or decrease in SCr to greater than or equal to 1.5 times baseline,   which is known or presumed to have occurred within the prior 7 days; or  -Urine volume < 0.5ml/kg/h for 6 hours. Options provided:  -- Acute kidney injury evidenced by, Please document evidence. -- Acute kidney injury ruled out after study  -- Other - I will add my own diagnosis  -- Disagree - Not applicable / Not valid  -- Disagree - Clinically unable to determine / Unknown  -- Refer to Clinical Documentation Reviewer    PROVIDER RESPONSE TEXT:    This patient has acute kidney injury as evidenced by dehydration    Query created by: Carole Abreu on 3/30/2023 6:48 AM      Electronically signed by:   Lianna Tam MD 3/30/2023 1:38 PM

## 2023-03-30 NOTE — TELEPHONE ENCOUNTER
----- Message from [employer], DO sent at 3/10/2023 10:37 AM EST -----  Recent labs are available. Your comprehensive metabolic panel shows normal electrolytes, slight decrease in kidney function, and a slight increase in globulin levels to 6.2. Normal ranges below 4.5. We can discuss further at your next visit. Your vitamin D level significantly low at 15.0. Normal ranges from . I am recommending a 5000 international unit vitamin D3 to be taken on a daily basis. No concerns for previous hepatitis C virus exposure. Your lipid panel shows a very good total cholesterol at 126, goal is to be below 200. Your LDL cholesterol, the bad cholesterol, is at 63, goal is to be below 100.

## 2023-03-30 NOTE — PROGRESS NOTES
END OF SHIFT NOTE:    INTAKE/OUTPUT  03/29 0701 - 03/30 0700  In: 3076.8 [P.O.:360; I.V.:2716.8]  Out: 1507.5 [Urine:900; Drains:57.5]  Voiding: Yes  Catheter: No  Drain:   Closed/Suction Drain Superior;Midline Abdomen Bulb (Active)   Site Description Clean, dry & intact 03/30/23 0143   Dressing Status New dressing applied 03/30/23 0143   Drainage Appearance Serosanguinous 03/30/23 0526   Drain Status Compressed; To bulb suction 03/30/23 0143   Output (ml) 15 ml 03/30/23 0526     Complaints of constant hiccups. Flatus: Patient does have flatus present. Stool: 0 occurrences. Characteristics:  Stool Appearance: Unable to assess  Stool Color: Unable to assess  Stool Amount: Unable to assess  Stool Assessment  Stool Appearance: Unable to assess  Stool Color: Unable to assess  Stool Amount: Unable to assess  Last BM (including prior to admit): 03/26/23 (per pt)    Emesis: 0 occurrences. Characteristics:   Emesis Appearance: [address]  Patient Vitals for the past 12 hrs:   Temp Pulse Resp BP SpO2   03/30/23 0407 98.2 °F (36.8 °C) 93 17 (!) 140/93 94 %   03/30/23 0213 -- -- 16 -- --   03/30/23 0143 -- 98 18 -- 94 %   03/29/23 2353 99.7 °F (37.6 °C) (!) 101 17 120/80 91 %   03/29/23 2249 -- -- 16 -- --   03/29/23 2219 -- -- 16 -- --   03/29/23 1929 98.6 °F (37 °C) (!) 101 18 120/77 91 %   03/29/23 1920 -- (!) 102 18 -- --       Pain Assessment  Pain Level: 0 (03/30/23 0213)  Pain Location: Abdomen  Patient's Stated Pain Goal: 0 - No pain    Ambulating  No    Shift report given to oncoming nurse at the bedside.     James Franklin RN

## 2023-03-30 NOTE — TELEPHONE ENCOUNTER
----- Message from Salvador Bird DO sent at 3/10/2023 10:37 AM EST -----  Recent labs are available. Your comprehensive metabolic panel shows normal electrolytes, slight decrease in kidney function, and a slight increase in globulin levels to 6.2. Normal ranges below 4.5. We can discuss further at your next visit. Your vitamin D level significantly low at 15.0. Normal ranges from . I am recommending a 5000 international unit vitamin D3 to be taken on a daily basis. No concerns for previous hepatitis C virus exposure. Your lipid panel shows a very good total cholesterol at 126, goal is to be below 200. Your LDL cholesterol, the bad cholesterol, is at 63, goal is to be below 100.

## 2023-03-30 NOTE — PLAN OF CARE
Problem: Discharge Planning  Goal: Discharge to home or other facility with appropriate resources  3/30/2023 1912 by Bhargav Frye RN  Outcome: Progressing  3/30/2023 1628 by Italia Sams RN  Outcome: Progressing     Problem: Pain  Goal: Verbalizes/displays adequate comfort level or baseline comfort level  3/30/2023 1912 by Bhargav Frye RN  Outcome: Progressing  3/30/2023 1628 by Italia Sams RN  Outcome: Progressing     Problem: Safety - Adult  Goal: Free from fall injury  3/30/2023 1628 by Italia Sams RN  Outcome: Progressing     Problem: ABCDS Injury Assessment  Goal: Absence of physical injury  3/30/2023 1628 by Italia Sams RN  Outcome: Progressing     Problem: Skin/Tissue Integrity  Goal: Absence of new skin breakdown  Description: 1. Monitor for areas of redness and/or skin breakdown  2. Assess vascular access sites hourly  3. Every 4-6 hours minimum:  Change oxygen saturation probe site  4. Every 4-6 hours:  If on nasal continuous positive airway pressure, respiratory therapy assess nares and determine need for appliance change or resting period.   3/30/2023 1628 by Italia Sams RN  Outcome: Progressing

## 2023-03-31 ENCOUNTER — APPOINTMENT (OUTPATIENT)
Dept: GENERAL RADIOLOGY | Age: 68
End: 2023-03-31
Payer: MEDICAID

## 2023-03-31 LAB
ANION GAP SERPL CALC-SCNC: 3 MMOL/L (ref 2–11)
BASOPHILS # BLD: 0 K/UL (ref 0–0.2)
BASOPHILS NFR BLD: 0 % (ref 0–2)
BUN SERPL-MCNC: 35 MG/DL (ref 8–23)
CALCIUM SERPL-MCNC: 8 MG/DL (ref 8.3–10.4)
CHLORIDE SERPL-SCNC: 112 MMOL/L (ref 101–110)
CO2 SERPL-SCNC: 27 MMOL/L (ref 21–32)
CREAT SERPL-MCNC: 1.1 MG/DL (ref 0.8–1.5)
DIFFERENTIAL METHOD BLD: ABNORMAL
EOSINOPHIL # BLD: 0.1 K/UL (ref 0–0.8)
EOSINOPHIL NFR BLD: 2 % (ref 0.5–7.8)
ERYTHROCYTE [DISTWIDTH] IN BLOOD BY AUTOMATED COUNT: 12.5 % (ref 11.9–14.6)
GLUCOSE SERPL-MCNC: 89 MG/DL (ref 65–100)
HCT VFR BLD AUTO: 29.5 % (ref 41.1–50.3)
HGB BLD-MCNC: 9.7 G/DL (ref 13.6–17.2)
IMM GRANULOCYTES # BLD AUTO: 0 K/UL (ref 0–0.5)
IMM GRANULOCYTES NFR BLD AUTO: 0 % (ref 0–5)
LYMPHOCYTES # BLD: 1.9 K/UL (ref 0.5–4.6)
LYMPHOCYTES NFR BLD: 31 % (ref 13–44)
MCH RBC QN AUTO: 29.3 PG (ref 26.1–32.9)
MCHC RBC AUTO-ENTMCNC: 32.9 G/DL (ref 31.4–35)
MCV RBC AUTO: 89.1 FL (ref 82–102)
MONOCYTES # BLD: 0.5 K/UL (ref 0.1–1.3)
MONOCYTES NFR BLD: 9 % (ref 4–12)
NEUTS SEG # BLD: 3.5 K/UL (ref 1.7–8.2)
NEUTS SEG NFR BLD: 58 % (ref 43–78)
NRBC # BLD: 0 K/UL (ref 0–0.2)
PLATELET # BLD AUTO: 320 K/UL (ref 150–450)
PMV BLD AUTO: 9.5 FL (ref 9.4–12.3)
POTASSIUM SERPL-SCNC: 3.9 MMOL/L (ref 3.5–5.1)
RBC # BLD AUTO: 3.31 M/UL (ref 4.23–5.6)
SODIUM SERPL-SCNC: 142 MMOL/L (ref 133–143)
WBC # BLD AUTO: 6.1 K/UL (ref 4.3–11.1)

## 2023-03-31 PROCEDURE — 94760 N-INVAS EAR/PLS OXIMETRY 1: CPT

## 2023-03-31 PROCEDURE — 6370000000 HC RX 637 (ALT 250 FOR IP): Performed by: INTERNAL MEDICINE

## 2023-03-31 PROCEDURE — 80048 BASIC METABOLIC PNL TOTAL CA: CPT

## 2023-03-31 PROCEDURE — 94761 N-INVAS EAR/PLS OXIMETRY MLT: CPT

## 2023-03-31 PROCEDURE — 6370000000 HC RX 637 (ALT 250 FOR IP): Performed by: STUDENT IN AN ORGANIZED HEALTH CARE EDUCATION/TRAINING PROGRAM

## 2023-03-31 PROCEDURE — 94640 AIRWAY INHALATION TREATMENT: CPT

## 2023-03-31 PROCEDURE — 97530 THERAPEUTIC ACTIVITIES: CPT

## 2023-03-31 PROCEDURE — 36415 COLL VENOUS BLD VENIPUNCTURE: CPT

## 2023-03-31 PROCEDURE — 97116 GAIT TRAINING THERAPY: CPT

## 2023-03-31 PROCEDURE — 85025 COMPLETE CBC W/AUTO DIFF WBC: CPT

## 2023-03-31 PROCEDURE — 6370000000 HC RX 637 (ALT 250 FOR IP)

## 2023-03-31 PROCEDURE — 73030 X-RAY EXAM OF SHOULDER: CPT

## 2023-03-31 PROCEDURE — 6360000002 HC RX W HCPCS: Performed by: NURSE PRACTITIONER

## 2023-03-31 PROCEDURE — 1100000000 HC RM PRIVATE

## 2023-03-31 PROCEDURE — 2580000003 HC RX 258: Performed by: NURSE PRACTITIONER

## 2023-03-31 RX ADMIN — IPRATROPIUM BROMIDE AND ALBUTEROL SULFATE 1 AMPULE: .5; 3 SOLUTION RESPIRATORY (INHALATION) at 07:07

## 2023-03-31 RX ADMIN — SODIUM CHLORIDE, PRESERVATIVE FREE 10 ML: 5 INJECTION INTRAVENOUS at 08:55

## 2023-03-31 RX ADMIN — METHOCARBAMOL TABLETS 750 MG: 750 TABLET, COATED ORAL at 08:55

## 2023-03-31 RX ADMIN — SODIUM CHLORIDE, PRESERVATIVE FREE 10 ML: 5 INJECTION INTRAVENOUS at 20:18

## 2023-03-31 RX ADMIN — FAMOTIDINE 20 MG: 20 TABLET, FILM COATED ORAL at 08:55

## 2023-03-31 RX ADMIN — METOPROLOL TARTRATE 25 MG: 25 TABLET, FILM COATED ORAL at 08:55

## 2023-03-31 RX ADMIN — IPRATROPIUM BROMIDE AND ALBUTEROL SULFATE 1 AMPULE: .5; 3 SOLUTION RESPIRATORY (INHALATION) at 10:59

## 2023-03-31 RX ADMIN — MORPHINE SULFATE 2 MG: 2 INJECTION, SOLUTION INTRAMUSCULAR; INTRAVENOUS at 20:17

## 2023-03-31 RX ADMIN — IPRATROPIUM BROMIDE AND ALBUTEROL SULFATE 1 AMPULE: .5; 3 SOLUTION RESPIRATORY (INHALATION) at 16:54

## 2023-03-31 RX ADMIN — METHOCARBAMOL TABLETS 750 MG: 750 TABLET, COATED ORAL at 14:29

## 2023-03-31 ASSESSMENT — PAIN DESCRIPTION - ORIENTATION: ORIENTATION: ANTERIOR

## 2023-03-31 ASSESSMENT — PAIN DESCRIPTION - LOCATION: LOCATION: ABDOMEN

## 2023-03-31 ASSESSMENT — PAIN DESCRIPTION - DESCRIPTORS: DESCRIPTORS: ACHING

## 2023-03-31 ASSESSMENT — PAIN SCALES - GENERAL
PAINLEVEL_OUTOF10: 10
PAINLEVEL_OUTOF10: 4

## 2023-03-31 NOTE — PROGRESS NOTES
ACUTE PHYSICAL THERAPY GOALS:   (Developed with and agreed upon by patient and/or caregiver. )  LTG:  (1.)Mr. Vitaly Acosta will move from supine to sit and sit to supine , scoot up and down, and roll side to side in bed with INDEPENDENCE within 7-10 treatment day(s). (2.)Mr. Vitaly Acosta will transfer from bed to chair and chair to bed with INDEPENDENCE using the least restrictive device within 7-10 treatment day(s). (3.)Mr. Vitaly Acosta will ambulate with MODIFIED INDEPENDENCE for 500 feet with the least restrictive device within 7-10 treatment day(s). (4.)Mr. Vitaly Acosta will perform static/dynamic standing balance exercises for 10 minutes with SUPERVISION in order to improve functional mobility within 7-10 treatment day(s). PHYSICAL THERAPY: Daily Note AM   (Link to Caseload Tracking: PT Visit Days : 2  Time In/Out PT Charge Capture  Rehab Caseload Tracker  Orders    Flaquito Luu is a 78 y.o. male   PRIMARY DIAGNOSIS: SBO (small bowel obstruction) ([facility])  SBO (small bowel obstruction) ([facility] [K56.609]  Small bowel obstruction due to postoperative adhesions [K91.30]  Procedure(s) (LRB):  LAPAROSCOPY DIAGNOSTIC, converted to open (N/A)  3 Days Post-Op  In[place] / Product Type: *No Product type* /     ASSESSMENT:     REHAB RECOMMENDATIONS:   Recommendation to date pending progress:  Setting:  Home Health Therapy    Equipment:    None     ASSESSMENT:  Mr. Vitaly Acosta demonstrates improved mobility and activity tolerance/gait distance today. Does need encouragement to initiate session however then willing to walk. Instructed on post op log roll technique to decrease pain with bed mobility/transfer to sitting. Gait training x 250 ft in hallway with RW, CGA/SBA. Pt with elevated shoulders and forward flexed head/neck which he states is baseline. Returned to room and worked on functional transfers, standing balance/tolerance in bathroom.  Performs personal hygiene with mod I, needs help with

## 2023-03-31 NOTE — PROGRESS NOTES
Admit Date: 3/25/2023    POD3: Diagnostic laparoscopy, converted to exploratory laparotomy, segmental small bowel resection, reduction of internal hernia, explantation of mesh     Surgeon: Roma Silva DO 3/24/23    Subjective:     Patient awake in bed. C/o left shoulder \"popping in and out of place\". This has been happening for a long time. NG tube is out. No nausea or vomiting.  +flatus and multiple BM's overnight. Abdominal pain is currently well controlled. Voiding without difficulty. AF, NAD. Objective:       Vitals:    23 2337 23 0709 23 0727   BP: (!) 145/85 136/84  127/72   Pulse: 86 65 68 63   Resp: 16 17 18 16   Temp: 98.4 °F (36.9 °C) 98.4 °F (36.9 °C)  99 °F (37.2 °C)   TempSrc: Oral Axillary  Oral   SpO2: 98% 91% 91% 96%   Weight:       Height:           Temp (24hrs), Av.3 °F (36.8 °C), Min:97.7 °F (36.5 °C), Max:99 °F (37.2 °C)  . I&O reviewed as documented. Mid abdomen ANIYA drain 27.5mL SS output past 24 hours  475 UOP past 24 hours  Positive flatus + BM 3/31/2023      Physical Exam  Constitutional:       General: He is not in acute distress. Appearance: Normal appearance. HENT:      Mouth/Throat:      Mouth: Mucous membranes are dry. Cardiovascular:      Rate and Rhythm: Normal rate and regular rhythm. Pulses: Normal pulses. Heart sounds: Normal heart sounds. No murmur heard. No friction rub. No gallop. Pulmonary:      Effort: Pulmonary effort is normal. No respiratory distress. Breath sounds: Normal breath sounds. Comments: Room air  Abdominal:      General: Bowel sounds are normal. There is no distension. Palpations: Abdomen is soft. Musculoskeletal:         General: No swelling. Normal range of motion. Cervical back: No rigidity. Skin:     General: Skin is warm and dry. Capillary Refill: Capillary refill takes less than 2 seconds.       Comments: Midline abdominal incision C/d/I with  no signs of infection. Mid abdomen ANIYA drain ss with no signs of infection   Neurological:      Mental Status: He is oriented to person, place, and time. Psychiatric:         Behavior: Behavior normal.      Labs:   Recent Results (from the past 24 hour(s))   CBC with Auto Differential    Collection Time: 03/31/23  6:15 AM   Result Value Ref Range    WBC 6.1 4.3 - 11.1 K/uL    RBC 3.31 (L) 4.23 - 5.6 M/uL    Hemoglobin 9.7 (L) 13.6 - 17.2 g/dL    Hematocrit 29.5 (L) 41.1 - 50.3 %    MCV 89.1 82 - 102 FL    MCH 29.3 26.1 - 32.9 PG    MCHC 32.9 31.4 - 35.0 g/dL    RDW 12.5 11.9 - 14.6 %    Platelets 101 900 - 199 K/uL    MPV 9.5 9.4 - 12.3 FL    nRBC 0.00 0.0 - 0.2 K/uL    Differential Type AUTOMATED      Seg Neutrophils 58 43 - 78 %    Lymphocytes 31 13 - 44 %    Monocytes 9 4.0 - 12.0 %    Eosinophils % 2 0.5 - 7.8 %    Basophils 0 0.0 - 2.0 %    Immature Granulocytes 0 0.0 - 5.0 %    Segs Absolute 3.5 1.7 - 8.2 K/UL    Absolute Lymph # 1.9 0.5 - 4.6 K/UL    Absolute Mono # 0.5 0.1 - 1.3 K/UL    Absolute Eos # 0.1 0.0 - 0.8 K/UL    Basophils Absolute 0.0 0.0 - 0.2 K/UL    Absolute Immature Granulocyte 0.0 0.0 - 0.5 K/UL   Basic Metabolic Panel w/ Reflex to MG    Collection Time: 03/31/23  6:15 AM   Result Value Ref Range    Sodium 142 133 - 143 mmol/L    Potassium 3.9 3.5 - 5.1 mmol/L    Chloride 112 (H) 101 - 110 mmol/L    CO2 27 21 - 32 mmol/L    Anion Gap 3 2 - 11 mmol/L    Glucose 89 65 - 100 mg/dL    BUN 35 (H) 8 - 23 MG/DL    Creatinine 1.10 0.8 - 1.5 MG/DL    Est, Glom Filt Rate >60 >60 ml/min/1.73m2    Calcium 8.0 (L) 8.3 - 10.4 MG/DL         Assessment:     Principal Problem:    SBO (small bowel obstruction) (HCC)  Resolved Problems:    * No resolved hospital problems.  *        Plan/Recommendations/Medical Decision Making:   Soft and bite sized diet   X-Ray Left Shoulder  PT eval and treat  Hospitalist following for medical management    Marielos Hdz, FNP-BC Stable

## 2023-04-01 VITALS
BODY MASS INDEX: 22.22 KG/M2 | HEIGHT: 69 IN | DIASTOLIC BLOOD PRESSURE: 93 MMHG | TEMPERATURE: 98.4 F | SYSTOLIC BLOOD PRESSURE: 143 MMHG | WEIGHT: 150 LBS | RESPIRATION RATE: 16 BRPM | HEART RATE: 72 BPM | OXYGEN SATURATION: 94 %

## 2023-04-01 PROBLEM — K56.609 SBO (SMALL BOWEL OBSTRUCTION) (HCC): Status: RESOLVED | Noted: 2023-03-26 | Resolved: 2023-04-01

## 2023-04-01 LAB
BASOPHILS # BLD: 0 K/UL (ref 0–0.2)
BASOPHILS NFR BLD: 0 % (ref 0–2)
DIFFERENTIAL METHOD BLD: ABNORMAL
EOSINOPHIL # BLD: 0.1 K/UL (ref 0–0.8)
EOSINOPHIL NFR BLD: 2 % (ref 0.5–7.8)
ERYTHROCYTE [DISTWIDTH] IN BLOOD BY AUTOMATED COUNT: 12 % (ref 11.9–14.6)
HCT VFR BLD AUTO: 32.9 % (ref 41.1–50.3)
HGB BLD-MCNC: 10.8 G/DL (ref 13.6–17.2)
IMM GRANULOCYTES # BLD AUTO: 0.1 K/UL (ref 0–0.5)
IMM GRANULOCYTES NFR BLD AUTO: 1 % (ref 0–5)
LYMPHOCYTES # BLD: 2 K/UL (ref 0.5–4.6)
LYMPHOCYTES NFR BLD: 27 % (ref 13–44)
MCH RBC QN AUTO: 29.1 PG (ref 26.1–32.9)
MCHC RBC AUTO-ENTMCNC: 32.8 G/DL (ref 31.4–35)
MCV RBC AUTO: 88.7 FL (ref 82–102)
MONOCYTES # BLD: 0.8 K/UL (ref 0.1–1.3)
MONOCYTES NFR BLD: 12 % (ref 4–12)
NEUTS SEG # BLD: 4.2 K/UL (ref 1.7–8.2)
NEUTS SEG NFR BLD: 58 % (ref 43–78)
NRBC # BLD: 0 K/UL (ref 0–0.2)
PLATELET # BLD AUTO: 362 K/UL (ref 150–450)
PMV BLD AUTO: 9.5 FL (ref 9.4–12.3)
RBC # BLD AUTO: 3.71 M/UL (ref 4.23–5.6)
WBC # BLD AUTO: 7.2 K/UL (ref 4.3–11.1)

## 2023-04-01 PROCEDURE — 2580000003 HC RX 258: Performed by: INTERNAL MEDICINE

## 2023-04-01 PROCEDURE — 36415 COLL VENOUS BLD VENIPUNCTURE: CPT

## 2023-04-01 PROCEDURE — 85025 COMPLETE CBC W/AUTO DIFF WBC: CPT

## 2023-04-01 PROCEDURE — 6360000002 HC RX W HCPCS

## 2023-04-01 PROCEDURE — 6370000000 HC RX 637 (ALT 250 FOR IP): Performed by: INTERNAL MEDICINE

## 2023-04-01 PROCEDURE — 6360000002 HC RX W HCPCS: Performed by: NURSE PRACTITIONER

## 2023-04-01 PROCEDURE — 6370000000 HC RX 637 (ALT 250 FOR IP): Performed by: STUDENT IN AN ORGANIZED HEALTH CARE EDUCATION/TRAINING PROGRAM

## 2023-04-01 PROCEDURE — 2580000003 HC RX 258: Performed by: NURSE PRACTITIONER

## 2023-04-01 PROCEDURE — 6370000000 HC RX 637 (ALT 250 FOR IP)

## 2023-04-01 RX ORDER — OXYCODONE HYDROCHLORIDE AND ACETAMINOPHEN 5; 325 MG/1; MG/1
1 TABLET ORAL EVERY 4 HOURS PRN
Qty: 15 TABLET | Refills: 0 | Status: SHIPPED | OUTPATIENT
Start: 2023-04-01 | End: 2023-04-06

## 2023-04-01 RX ORDER — METHOCARBAMOL 750 MG/1
750 TABLET, FILM COATED ORAL 3 TIMES DAILY
Qty: 30 TABLET | Refills: 0 | Status: SHIPPED | OUTPATIENT
Start: 2023-04-01 | End: 2023-04-11

## 2023-04-01 RX ORDER — OXYCODONE HYDROCHLORIDE AND ACETAMINOPHEN 5; 325 MG/1; MG/1
1 TABLET ORAL EVERY 4 HOURS PRN
Status: DISCONTINUED | OUTPATIENT
Start: 2023-04-01 | End: 2023-04-01 | Stop reason: HOSPADM

## 2023-04-01 RX ADMIN — SODIUM CHLORIDE, PRESERVATIVE FREE 10 ML: 5 INJECTION INTRAVENOUS at 09:06

## 2023-04-01 RX ADMIN — METOCLOPRAMIDE 5 MG: 5 INJECTION, SOLUTION INTRAMUSCULAR; INTRAVENOUS at 17:50

## 2023-04-01 RX ADMIN — MORPHINE SULFATE 2 MG: 2 INJECTION, SOLUTION INTRAMUSCULAR; INTRAVENOUS at 06:26

## 2023-04-01 RX ADMIN — METHOCARBAMOL TABLETS 750 MG: 750 TABLET, COATED ORAL at 13:38

## 2023-04-01 RX ADMIN — METOPROLOL TARTRATE 25 MG: 25 TABLET, FILM COATED ORAL at 09:05

## 2023-04-01 RX ADMIN — METHOCARBAMOL TABLETS 750 MG: 750 TABLET, COATED ORAL at 09:05

## 2023-04-01 RX ADMIN — METOCLOPRAMIDE 5 MG: 5 INJECTION, SOLUTION INTRAMUSCULAR; INTRAVENOUS at 13:38

## 2023-04-01 RX ADMIN — HYDROMORPHONE HYDROCHLORIDE 0.5 MG: 1 INJECTION, SOLUTION INTRAMUSCULAR; INTRAVENOUS; SUBCUTANEOUS at 01:43

## 2023-04-01 RX ADMIN — SODIUM CHLORIDE, POTASSIUM CHLORIDE, SODIUM LACTATE AND CALCIUM CHLORIDE: 600; 310; 30; 20 INJECTION, SOLUTION INTRAVENOUS at 06:27

## 2023-04-01 RX ADMIN — DOCUSATE SODIUM 100 MG: 100 CAPSULE, LIQUID FILLED ORAL at 09:05

## 2023-04-01 RX ADMIN — FAMOTIDINE 20 MG: 20 TABLET, FILM COATED ORAL at 09:05

## 2023-04-01 ASSESSMENT — PAIN DESCRIPTION - ORIENTATION
ORIENTATION: ANTERIOR
ORIENTATION: ANTERIOR

## 2023-04-01 ASSESSMENT — PAIN DESCRIPTION - LOCATION
LOCATION: ABDOMEN
LOCATION: ABDOMEN

## 2023-04-01 ASSESSMENT — PAIN SCALES - GENERAL
PAINLEVEL_OUTOF10: 4
PAINLEVEL_OUTOF10: 8
PAINLEVEL_OUTOF10: 9

## 2023-04-01 ASSESSMENT — PAIN DESCRIPTION - DESCRIPTORS
DESCRIPTORS: ACHING
DESCRIPTORS: ACHING

## 2023-04-01 NOTE — CARE COORDINATION
Patient is medically stable for discharge today. Home Health services ordered with Maira Ag. VM left with Intake, regarding new referral. Patient scheduled for a follow up appointment with Dr. Edgardo Garcia on 4/17/2023, per chart. RW ordered and supplied by [facility] No additional discharge needs identified at this time. Family to transport. 03/27/23 5862   Service Assessment   Patient Orientation Alert and Oriented   Cognition Alert   History Provided By Patient   Primary [address] [facility]  Family Members;Friends/Neighbors   Patient's Healthcare Decision Maker is: Named in [facility]   PCP Verified by CM Yes   Prior Functional Level Independent in ADLs/IADLs   Current Functional Level Independent in ADLs/IADLs   Can patient return to prior living arrangement Yes   Ability to make needs known: Good   Family able to assist with home care needs: Yes   Would you like for me to discuss the discharge plan with any other family members/significant others, and if so, who? No   Financial Resources [employer] None   Social/Functional History   Lives With Family   Type of Home Apartment   Home Layout One level   Home Access Stairs to enter with rails   Entrance Stairs - Number of Steps [address] chair   Home Equipment Walker, [address] Help From Family   ADL Assistance Independent   Active  No   Patient's  Info Sister Transports   Occupation Retired   Discharge Planning   Type of Residence Apartment   Living Arrangements Family Members   Current Services Prior To Admission Durable Medical Equipment   Current DME Prior to [address] [facility] DME Needed [place]   DME Ordered? [employer]  (Supplied by [facility]   Potential Assistance Purchasing Medications No   Type of Home Care Services OT;PT;Nursing Services; Aide Services   Patient expects to be discharged to:
RNCM: Patient admitted 3/26 with SBO. Patient s/p diagnostic laparoscopy, converted to exploratory laparotomy, segmental small bowel resection, reduction of internal hernia, explantation of mesh on 3/28. CM following for discharge needs.
RNCM: Patient admitted 3/26 with SBO. Patient s/p diagnostic laparoscopy, converted to exploratory laparotomy, segmental small bowel resection, reduction of internal hernia, explantation of mesh on 3/28. Patient it tolerating a soft diet. PT recommending home health and a rolling walker at discharge. CM following.
story

## 2023-04-01 NOTE — DISCHARGE SUMMARY
Physician Discharge Summary     Patient ID:  Severo Mercer  [id number]  60 y.o.  1955    Admit date: 3/25/2023    Discharge date: April 1, 2023    Admitting Physician: Latrice Jean-Baptiste MD     Discharge Physician: Dr. Donovan Montalvo    Admission Diagnoses: S[facility]) ([facility] [S64.562]  Small bowel obstruction due to postoperative adhesions [K91.30]      Admission Condition: serious    Discharged Condition: stable    Indication for Admission: Surgery    Hospital Course:   Severo Mercer is a 78 y.o. male with a past medical history of tobacco use, chest pain, and CVA. Pt is S/p Diagnostic Laparoscopy, Open recurrent incarcerated hernia repair 6cm on 3/17/23 by Dr. Arie Joaquin. Pt presented to the ED 3/25/23 with C/o nausea, vomiting, and lower abdominal pain radiating to his testicles that began earlier in the day. Pt underwent Diagnostic laparoscopy, converted to exploratory laparotomy, segmental small bowel resection, reduction of internal hernia, explantation of mesh 3/28/23 by Dr Arie Joaquin. The patient progressed satisfactorily meeting milestones necessary for successful discharge including tolerating a diet, adequate mobility, adequate pain control, and active bowel function. Patient was deemed a good candidate for discharge at the time of morning rounding. They are to follow up as indicated in their provided discharge paperwork. Consults: Hospitalist    Significant Diagnostic Studies: labs and radiology    Outstanding Order Results       No orders found from 2/24/2023 to 3/26/2023. Treatments: surgery    Discharge Exam:  See progress note dated 4/1/23    Disposition: home    Patient Instructions:   Driving:            No driving while taking pain medications     Activity:            No strenous activity. Slowly advance as tolerated. No lifting greater than 20lbs. Diet:                 Slowly advance as tolerated. sleeping. Please use caution when taking narcotics and any of your home medications that may cause drowsiness. *  Please give a list of your current medications to your Primary Care Provider. *  Please update this list whenever your medications are discontinued, doses are      changed, or new medications (including over-the-counter products) are added. *  Please carry medication information at all times in case of emergency situations.     Signed:  RADHA Heath  4/1/2023  1:14 PM

## 2023-04-01 NOTE — PROGRESS NOTES
Patient refused all scheduled medications listed on MAR tonight. States he was poked and prodded all day and does not want to take medications.

## 2023-04-01 NOTE — DISCHARGE INSTRUCTIONS
Please call our office for a follow-up appointment in 1-2 week(s). Driving: No driving while taking pain medications    Activity: No strenous activity. Slowly advance as tolerated. No lifting greater than 20lbs. Diet:  Slowly advance as tolerated. Increase your fluids and fiber, as pain medications may cause constipation. No alcoholic beverages. Bathing: You may shower. Utilize sitz baths    Dressing: If outer dressing, remove in 24 hours. Other:  Ice to incision as needed for pain. If drains present, empty and record    output daily. Call Dr. Kristin Woodard if you have:  ? Temperature greater than 100.4  ? Persistent nausea and vomiting  ? Severe uncontrolled pain  ? Redness, tenderness, or signs of infection (pain, swelling, redness, odor or green/yellow discharge around the site)  ? Difficulty breathing, headache or visual disturbances  ? Hives  ? Persistent dizziness or light-headedness  ? Extreme fatigue  ? Any other questions or concerns you may have after discharge  MEDICATION INTERACTION:    During your procedure you potentially received a medication or medications which may reduce the effectiveness of oral contraceptives. Please consider other forms of contraception for 1 month following your procedure if you are currently using oral contraceptives as your primary form of birth control. In addition to this, we recommend continuing your oral contraceptive as prescribed, unless otherwise instructed by your physician, during this time.     After general anesthesia or intravenous sedation, for 24 hours or while taking prescription Narcotics:  Limit your activities  A responsible adult needs to be with you for the next 24 hours  Do not drive and operate hazardous machinery  Do not make important personal or business decisions  Do not drink alcoholic beverages  If you have not urinated within 8 hours after discharge, and you are experiencing discomfort from urinary retention, please go to the nearest ED. If you have sleep apnea and have a CPAP machine, please use it for all naps and sleeping. Please use caution when taking narcotics and any of your home medications that may cause drowsiness. *  Please give a list of your current medications to your Primary Care Provider. *  Please update this list whenever your medications are discontinued, doses are      changed, or new medications (including over-the-counter products) are added. *  Please carry medication information at all times in case of emergency situations. These are general instructions for a healthy lifestyle:  No smoking/ No tobacco products/ Avoid exposure to second hand smoke  Surgeon General's Warning:  Quitting smoking now greatly reduces serious risk to your health. Obesity, smoking, and sedentary lifestyle greatly increases your risk for illness  A healthy diet, regular physical exercise & weight monitoring are important for maintaining a healthy lifestyle    You may be retaining fluid if you have a history of heart failure or if you experience any of the following symptoms:  Weight gain of 3 pounds or more overnight or 5 pounds in a week, increased swelling in our hands or feet or shortness of breath while lying flat in bed. Please call your doctor as soon as you notice any of these symptoms; do not wait until your next office visit.

## 2023-04-01 NOTE — PROGRESS NOTES
Admit Date: 3/25/2023    POD4: Diagnostic laparoscopy, converted to exploratory laparotomy, segmental small bowel resection, reduction of internal hernia, explantation of mesh     Surgeon: Pantera Pelaez,  3/24/23    Subjective:     Patient resting in bed with eyes closed. No complaints or new issues. Tolerating Soft diet. No nausea or vomiting.  +flatus/+BM. Abdominal pain is currently well controlled. Voiding without difficulty. AF, NAD. Objective:       Vitals:    23 1934 23 2328 23 0355 23 0832   BP: (!) 145/84 (!) 144/88 (!) 155/90 129/71   Pulse: 64 87 78 79   Resp: 17 19 17 16   Temp: 98.6 °F (37 °C) 99 °F (37.2 °C) 99.5 °F (37.5 °C) 98.8 °F (37.1 °C)   TempSrc: Oral Oral Oral Oral   SpO2: 93% 95% 95% 94%   Weight:       Height:           Temp (24hrs), Av.9 °F (37.2 °C), Min:98.4 °F (36.9 °C), Max:99.5 °F (37.5 °C)  . I&O reviewed as documented. Mid abdomen ANIYA drain 10mL SS output past 24 hours  660 UOP past 24 hours  Positive flatus /+ BM     Physical Exam  Constitutional:       General: He is not in acute distress. Appearance: Normal appearance. HENT:      Mouth/Throat:      Mouth: Mucous membranes are dry. Cardiovascular:      Rate and Rhythm: Normal rate and regular rhythm. Pulses: Normal pulses. Heart sounds: Normal heart sounds. No murmur heard. No friction rub. No gallop. Pulmonary:      Effort: Pulmonary effort is normal. No respiratory distress. Breath sounds: Normal breath sounds. Comments: Room air  Abdominal:      General: Bowel sounds are normal. There is no distension. Palpations: Abdomen is soft. Musculoskeletal:         General: No swelling. Normal range of motion. Cervical back: No rigidity. Skin:     General: Skin is warm and dry. Capillary Refill: Capillary refill takes less than 2 seconds. Comments: Midline abdominal incision C/d/I with  no signs of infection.   Mid abdomen ANIYA drain ss with no signs of infection   Neurological:      Mental Status: He is oriented to person, place, and time. Psychiatric:         Behavior: Behavior normal.      Labs:   Recent Results (from the past 24 hour(s))   CBC with Auto Differential    Collection Time: 04/01/23  6:15 AM   Result Value Ref Range    WBC 7.2 4.3 - 11.1 K/uL    RBC 3.71 (L) 4.23 - 5.6 M/uL    Hemoglobin 10.8 (L) 13.6 - 17.2 g/dL    Hematocrit 32.9 (L) 41.1 - 50.3 %    MCV 88.7 82 - 102 FL    MCH 29.1 26.1 - 32.9 PG    MCHC 32.8 31.4 - 35.0 g/dL    RDW 12.0 11.9 - 14.6 %    Platelets 215 304 - 474 K/uL    MPV 9.5 9.4 - 12.3 FL    nRBC 0.00 0.0 - 0.2 K/uL    Differential Type AUTOMATED      Seg Neutrophils 58 43 - 78 %    Lymphocytes 27 13 - 44 %    Monocytes 12 4.0 - 12.0 %    Eosinophils % 2 0.5 - 7.8 %    Basophils 0 0.0 - 2.0 %    Immature Granulocytes 1 0.0 - 5.0 %    Segs Absolute 4.2 1.7 - 8.2 K/UL    Absolute Lymph # 2.0 0.5 - 4.6 K/UL    Absolute Mono # 0.8 0.1 - 1.3 K/UL    Absolute Eos # 0.1 0.0 - 0.8 K/UL    Basophils Absolute 0.0 0.0 - 0.2 K/UL    Absolute Immature Granulocyte 0.1 0.0 - 0.5 K/UL         Assessment:     Principal Problem:    SBO (small bowel obstruction) ([facility])  Resolved Problems:    * No resolved hospital problems.  *        Plan/Recommendations/Medical Decision Making:   Soft and bite sized diet   DC ANIYA drain  PT following  Hospitalist has signed off  Possibly home later today or in am.    Jaime Alvarado, ZEHRAP-BC

## 2023-04-02 ENCOUNTER — HOME HEALTH ADMISSION (OUTPATIENT)
Dept: HOME HEALTH SERVICES | Facility: HOME HEALTH | Age: 68
End: 2023-04-02
Payer: MEDICARE

## 2023-04-03 ENCOUNTER — TELEPHONE (OUTPATIENT)
Dept: INTERNAL MEDICINE CLINIC | Facility: CLINIC | Age: 68
End: 2023-04-03

## 2023-04-03 ENCOUNTER — HOME CARE VISIT (OUTPATIENT)
Dept: SCHEDULING | Facility: HOME HEALTH | Age: 68
End: 2023-04-03
Payer: MEDICAID

## 2023-04-03 VITALS
TEMPERATURE: 98.8 F | SYSTOLIC BLOOD PRESSURE: 136 MMHG | OXYGEN SATURATION: 95 % | RESPIRATION RATE: 16 BRPM | HEART RATE: 80 BPM | DIASTOLIC BLOOD PRESSURE: 84 MMHG

## 2023-04-03 PROCEDURE — G0299 HHS/HOSPICE OF RN EA 15 MIN: HCPCS

## 2023-04-03 ASSESSMENT — ENCOUNTER SYMPTOMS
DIARRHEA: 1
BOWEL INCONTINENCE: 1
DYSPNEA ACTIVITY LEVEL: AFTER AMBULATING 10 - 20 FT
HEMOPTYSIS: 0
ABDOMINAL PAIN: 1
PAIN LOCATION - PAIN QUALITY: ACHE

## 2023-04-03 NOTE — TELEPHONE ENCOUNTER
Care Transitions Initial Follow Up Call    Outreach made within 2 business days of discharge: Yes    Patient: Cami Orantes Patient : 1955   MRN: [id number]  Reason for Admission: There are no discharge diagnoses documented for the most recent discharge. Discharge Date: 23       Spoke with: patient sister Keli Marques     Discharge department/facility: [facility] Interactive Patient Contact:  Was patient able to fill all prescriptions: Yes  Was patient instructed to bring all medications to the follow-up visit: Yes  Is patient taking all medications as directed in the discharge summary?  Yes  Does patient understand their discharge instructions: Yes  Does patient have questions or concerns that need addressed prior to 7-14 day follow up office visit: no    Scheduled appointment with PCP within 7-14 days    Follow Up  Future Appointments   Date Time Provider Angel Curtis   2023  3:15 PM Kim Mcarthur DO [facility]   2023  1:40 PM Saritha Gomez DO [address] [facility]   6/15/2023  9:45 AM Nicole Sawant MD [facility]       Angel Ramírez MA

## 2023-04-03 NOTE — CASE COMMUNICATION
SNV for admit    Patient hosp italized 03/25/2023. Patient presented to the ED 03/25/2023 with complaints of nausea, vomiting, and lower abdominal pain radiating to his testicles. S/P diagnostic laparoscopy, converted to exploratory laparotomy, segmental small bowel resection, reduction of internal hernia 03/28/2023 per Dr. Michael Coel. Patient worked with therapy, was tolerating a diet, had adequate mobility, adequate pain control, and act regulo bowel function a nd discharged home 04/01/2023. PMH: SBO, tobacco use, chest pain, and CVA     Sn met at door by pt sister. Pt states he was previously homeless and now living with sister. Home without downstatirs restroom. SN notifies Taita at Dr. Michael Cole office order to be placed for bedside commode. Pt ambulates in home with supervision. Mayito Link in home but pt states he is not using. Wounds healing nicely and pt states baljit n manages with rest and medication. Pt states he has not smoked in several weeks. Cg request JORGIOT carbajal for assistance with being assisgned at pt Medicaid aid and disposable briefs.     SN 3 wk 1, 2 wk 3, 1 wk 2, 3 prn

## 2023-04-04 ENCOUNTER — HOME CARE VISIT (OUTPATIENT)
Dept: SCHEDULING | Facility: HOME HEALTH | Age: 68
End: 2023-04-04
Payer: MEDICAID

## 2023-04-04 VITALS
SYSTOLIC BLOOD PRESSURE: 126 MMHG | OXYGEN SATURATION: 98 % | RESPIRATION RATE: 18 BRPM | TEMPERATURE: 98.2 F | DIASTOLIC BLOOD PRESSURE: 64 MMHG | HEART RATE: 74 BPM

## 2023-04-04 PROCEDURE — G0155 HHCP-SVS OF CSW,EA 15 MIN: HCPCS

## 2023-04-04 PROCEDURE — G0151 HHCP-SERV OF PT,EA 15 MIN: HCPCS

## 2023-04-04 ASSESSMENT — ENCOUNTER SYMPTOMS
DYSPNEA ACTIVITY LEVEL: AFTER AMBULATING 10 - 20 FT
ABDOMINAL PAIN: 1
PAIN LOCATION - PAIN QUALITY: ACHE

## 2023-04-05 ENCOUNTER — HOME CARE VISIT (OUTPATIENT)
Dept: SCHEDULING | Facility: HOME HEALTH | Age: 68
End: 2023-04-05
Payer: MEDICAID

## 2023-04-05 VITALS
TEMPERATURE: 98.4 F | DIASTOLIC BLOOD PRESSURE: 72 MMHG | OXYGEN SATURATION: 95 % | HEART RATE: 75 BPM | SYSTOLIC BLOOD PRESSURE: 148 MMHG | RESPIRATION RATE: 16 BRPM

## 2023-04-05 PROCEDURE — G0299 HHS/HOSPICE OF RN EA 15 MIN: HCPCS

## 2023-04-05 RX ORDER — DOXYCYCLINE HYCLATE 100 MG
100 TABLET ORAL 2 TIMES DAILY
Qty: 14 TABLET | Refills: 0 | Status: SHIPPED | OUTPATIENT
Start: 2023-04-05 | End: 2023-04-08 | Stop reason: SDUPTHER

## 2023-04-05 RX ORDER — DOXYCYCLINE HYCLATE 100 MG
100 TABLET ORAL 2 TIMES DAILY
Qty: 14 TABLET | Refills: 0 | Status: SHIPPED | OUTPATIENT
Start: 2023-04-05 | End: 2023-04-05

## 2023-04-05 ASSESSMENT — ENCOUNTER SYMPTOMS: STOOL DESCRIPTION: FORMED

## 2023-04-05 NOTE — PROGRESS NOTES
Home health nurse called with concerns of infection distal midline abd incision with purulent discharge. E-scribed 7 day course of doxycycline and pt was scheduled to in office incision check.

## 2023-04-06 ENCOUNTER — HOME CARE VISIT (OUTPATIENT)
Dept: SCHEDULING | Facility: HOME HEALTH | Age: 68
End: 2023-04-06
Payer: MEDICAID

## 2023-04-07 ENCOUNTER — HOME CARE VISIT (OUTPATIENT)
Dept: SCHEDULING | Facility: HOME HEALTH | Age: 68
End: 2023-04-07
Payer: MEDICAID

## 2023-04-07 VITALS
OXYGEN SATURATION: 96 % | DIASTOLIC BLOOD PRESSURE: 94 MMHG | TEMPERATURE: 99.1 F | SYSTOLIC BLOOD PRESSURE: 164 MMHG | HEART RATE: 88 BPM | RESPIRATION RATE: 16 BRPM

## 2023-04-07 PROCEDURE — G0299 HHS/HOSPICE OF RN EA 15 MIN: HCPCS

## 2023-04-07 ASSESSMENT — ENCOUNTER SYMPTOMS
PAIN LOCATION - PAIN QUALITY: ACHING
STOOL DESCRIPTION: SOFT FORMED

## 2023-04-08 ENCOUNTER — HOSPITAL ENCOUNTER (EMERGENCY)
Age: 68
Discharge: HOME | End: 2023-04-08
Attending: EMERGENCY MEDICINE
Payer: MEDICARE

## 2023-04-08 VITALS
WEIGHT: 136.91 LBS | HEIGHT: 70 IN | BODY MASS INDEX: 19.6 KG/M2 | HEART RATE: 94 BPM | OXYGEN SATURATION: 96 % | SYSTOLIC BLOOD PRESSURE: 138 MMHG | TEMPERATURE: 99.1 F | RESPIRATION RATE: 18 BRPM | DIASTOLIC BLOOD PRESSURE: 90 MMHG

## 2023-04-08 DIAGNOSIS — L24.A9 WOUND DRAINAGE: Primary | ICD-10-CM

## 2023-04-08 DIAGNOSIS — N39.0 URINARY TRACT INFECTION WITHOUT HEMATURIA, SITE UNSPECIFIED: ICD-10-CM

## 2023-04-08 DIAGNOSIS — R19.7 DIARRHEA, UNSPECIFIED TYPE: ICD-10-CM

## 2023-04-08 LAB
ALBUMIN SERPL-MCNC: 2.7 G/DL (ref 3.2–4.6)
ALBUMIN/GLOB SERPL: 0.7 (ref 0.4–1.6)
ALP SERPL-CCNC: 57 U/L (ref 50–136)
ALT SERPL-CCNC: 18 U/L (ref 12–65)
ANION GAP SERPL CALC-SCNC: 1 MMOL/L (ref 2–11)
APPEARANCE UR: CLEAR
AST SERPL-CCNC: 10 U/L (ref 15–37)
BACTERIA URNS QL MICRO: NEGATIVE /HPF
BASOPHILS # BLD: 0.1 K/UL (ref 0–0.2)
BASOPHILS NFR BLD: 0 % (ref 0–2)
BILIRUB SERPL-MCNC: 0.4 MG/DL (ref 0.2–1.1)
BILIRUB UR QL: NEGATIVE
BUN SERPL-MCNC: 15 MG/DL (ref 8–23)
CALCIUM SERPL-MCNC: 8.4 MG/DL (ref 8.3–10.4)
CASTS URNS QL MICRO: ABNORMAL /LPF
CHLORIDE SERPL-SCNC: 108 MMOL/L (ref 101–110)
CO2 SERPL-SCNC: 26 MMOL/L (ref 21–32)
COLOR UR: ABNORMAL
CREAT SERPL-MCNC: 1.3 MG/DL (ref 0.8–1.5)
DIFFERENTIAL METHOD BLD: ABNORMAL
EOSINOPHIL # BLD: 0.1 K/UL (ref 0–0.8)
EOSINOPHIL NFR BLD: 1 % (ref 0.5–7.8)
EPI CELLS #/AREA URNS HPF: ABNORMAL /HPF
ERYTHROCYTE [DISTWIDTH] IN BLOOD BY AUTOMATED COUNT: 12.7 % (ref 11.9–14.6)
GLOBULIN SER CALC-MCNC: 3.8 G/DL (ref 2.8–4.5)
GLUCOSE SERPL-MCNC: 96 MG/DL (ref 65–100)
GLUCOSE UR STRIP.AUTO-MCNC: NEGATIVE MG/DL
HCT VFR BLD AUTO: 33.1 % (ref 41.1–50.3)
HGB BLD-MCNC: 10.9 G/DL (ref 13.6–17.2)
HGB UR QL STRIP: NEGATIVE
IMM GRANULOCYTES # BLD AUTO: 0.1 K/UL (ref 0–0.5)
IMM GRANULOCYTES NFR BLD AUTO: 1 % (ref 0–5)
KETONES UR QL STRIP.AUTO: NEGATIVE MG/DL
LACTATE SERPL-SCNC: 1 MMOL/L (ref 0.4–2)
LEUKOCYTE ESTERASE UR QL STRIP.AUTO: ABNORMAL
LYMPHOCYTES # BLD: 2.5 K/UL (ref 0.5–4.6)
LYMPHOCYTES NFR BLD: 16 % (ref 13–44)
MCH RBC QN AUTO: 29.1 PG (ref 26.1–32.9)
MCHC RBC AUTO-ENTMCNC: 32.9 G/DL (ref 31.4–35)
MCV RBC AUTO: 88.3 FL (ref 82–102)
MONOCYTES # BLD: 0.9 K/UL (ref 0.1–1.3)
MONOCYTES NFR BLD: 6 % (ref 4–12)
NEUTS SEG # BLD: 12.4 K/UL (ref 1.7–8.2)
NEUTS SEG NFR BLD: 76 % (ref 43–78)
NITRITE UR QL STRIP.AUTO: NEGATIVE
NRBC # BLD: 0 K/UL (ref 0–0.2)
PH UR STRIP: 6 (ref 5–9)
PLATELET # BLD AUTO: 464 K/UL (ref 150–450)
PMV BLD AUTO: 9.5 FL (ref 9.4–12.3)
POTASSIUM SERPL-SCNC: 3.4 MMOL/L (ref 3.5–5.1)
PROCALCITONIN SERPL-MCNC: 0.24 NG/ML (ref 0–0.49)
PROT SERPL-MCNC: 6.5 G/DL (ref 6.3–8.2)
PROT UR STRIP-MCNC: 30 MG/DL
RBC # BLD AUTO: 3.75 M/UL (ref 4.23–5.6)
RBC #/AREA URNS HPF: ABNORMAL /HPF
SODIUM SERPL-SCNC: 135 MMOL/L (ref 133–143)
SP GR UR REFRACTOMETRY: 1.02 (ref 1–1.02)
UROBILINOGEN UR QL STRIP.AUTO: 1 EU/DL (ref 0.2–1)
WBC # BLD AUTO: 16.1 K/UL (ref 4.3–11.1)
WBC URNS QL MICRO: ABNORMAL /HPF

## 2023-04-08 PROCEDURE — 6370000000 HC RX 637 (ALT 250 FOR IP): Performed by: EMERGENCY MEDICINE

## 2023-04-08 PROCEDURE — 83605 ASSAY OF LACTIC ACID: CPT

## 2023-04-08 PROCEDURE — 87086 URINE CULTURE/COLONY COUNT: CPT

## 2023-04-08 PROCEDURE — 87045 FECES CULTURE AEROBIC BACT: CPT

## 2023-04-08 PROCEDURE — 81001 URINALYSIS AUTO W/SCOPE: CPT

## 2023-04-08 PROCEDURE — 87324 CLOSTRIDIUM AG IA: CPT

## 2023-04-08 PROCEDURE — 85025 COMPLETE CBC W/AUTO DIFF WBC: CPT

## 2023-04-08 PROCEDURE — 84145 PROCALCITONIN (PCT): CPT

## 2023-04-08 PROCEDURE — 87040 BLOOD CULTURE FOR BACTERIA: CPT

## 2023-04-08 PROCEDURE — 80053 COMPREHEN METABOLIC PANEL: CPT

## 2023-04-08 RX ORDER — DIPHENOXYLATE HYDROCHLORIDE AND ATROPINE SULFATE 2.5; .025 MG/1; MG/1
1 TABLET ORAL
Status: COMPLETED | OUTPATIENT
Start: 2023-04-08 | End: 2023-04-08

## 2023-04-08 RX ORDER — DIPHENOXYLATE HYDROCHLORIDE AND ATROPINE SULFATE 2.5; .025 MG/1; MG/1
1 TABLET ORAL 4 TIMES DAILY PRN
Qty: 16 TABLET | Refills: 0 | Status: SHIPPED | OUTPATIENT
Start: 2023-04-08 | End: 2023-04-12

## 2023-04-08 RX ORDER — DOXYCYCLINE HYCLATE 100 MG
100 TABLET ORAL 2 TIMES DAILY
Qty: 14 TABLET | Refills: 0 | Status: SHIPPED | OUTPATIENT
Start: 2023-04-08 | End: 2023-04-15

## 2023-04-08 RX ORDER — DOXYCYCLINE HYCLATE 100 MG/1
100 CAPSULE ORAL
Status: COMPLETED | OUTPATIENT
Start: 2023-04-08 | End: 2023-04-08

## 2023-04-08 RX ADMIN — DIPHENOXYLATE HYDROCHLORIDE AND ATROPINE SULFATE 1 TABLET: 2.5; .025 TABLET ORAL at 21:46

## 2023-04-08 RX ADMIN — DOXYCYCLINE HYCLATE 100 MG: 100 CAPSULE ORAL at 19:53

## 2023-04-08 ASSESSMENT — ENCOUNTER SYMPTOMS
WHEEZING: 0
ABDOMINAL PAIN: 1
BLOOD IN STOOL: 0
DIARRHEA: 1
COLOR CHANGE: 0
VOMITING: 0
COUGH: 0
NAUSEA: 0
SHORTNESS OF BREATH: 0

## 2023-04-08 ASSESSMENT — PAIN - FUNCTIONAL ASSESSMENT: PAIN_FUNCTIONAL_ASSESSMENT: NONE - DENIES PAIN

## 2023-04-08 ASSESSMENT — LIFESTYLE VARIABLES
HOW MANY STANDARD DRINKS CONTAINING ALCOHOL DO YOU HAVE ON A TYPICAL DAY: PATIENT DOES NOT DRINK
HOW OFTEN DO YOU HAVE A DRINK CONTAINING ALCOHOL: NEVER

## 2023-04-08 NOTE — ED TRIAGE NOTES
Pt to ED from home via EMS. Family called out for possible skin infection. Pt had a hernia surgery 4 days ago and was not sent home with antibiotics. Family states fever of 101 at home. Temp: 99.2 with EMS and 99.1 in triage. Pt states diarrhea beginning about two weeks ago. Pt denies taking anything at home for fever. Pt denies chest pain, SOB, NV. Pt states chills. Pt alert and oriented in triage.    HR: 102 palpated

## 2023-04-08 NOTE — ED PROVIDER NOTES
Year: Often true   Transportation Needs: Unmet Transportation Needs    Lack of Transportation (Non-Medical): Yes   Housing Stability: High Risk    Unstable Housing in the Last Year: Yes        Previous Medications    ASPIRIN EC 81 MG EC TABLET    Take 1 tablet by mouth daily    ATORVASTATIN (LIPITOR) 40 MG TABLET    Take 40 mg by mouth daily    DOCUSATE SODIUM (COLACE) 100 MG CAPSULE    Take 1 capsule by mouth 2 times daily    METHOCARBAMOL (ROBAXIN-750) 750 MG TABLET    Take 1 tablet by mouth 3 times daily for 10 days    ONDANSETRON (ZOFRAN-ODT) 4 MG DISINTEGRATING TABLET    Take 1 tablet by mouth every 8 hours as needed for Nausea or Vomiting    VITAMIN D (D3 5000) 125 MCG (5000 UT) CAPS CAPSULE    Take 1 capsule by mouth daily        Results for orders placed or performed during the hospital encounter of 04/08/23   Culture, Blood 1    Specimen: Blood   Result Value Ref Range    Special Requests LEFT  Antecubital        Culture PENDING    Urinalysis   Result Value Ref Range    Color, UA YELLOW/STRAW      Appearance CLEAR      Specific Gravity, UA 1.021 1.001 - 1.023      pH, Urine 6.0 5.0 - 9.0      Protein, UA 30 (A) NEG mg/dL    Glucose, UA Negative mg/dL    Ketones, Urine Negative NEG mg/dL    Bilirubin Urine Negative NEG      Blood, Urine Negative NEG      Urobilinogen, Urine 1.0 0.2 - 1.0 EU/dL    Nitrite, Urine Negative NEG      Leukocyte Esterase, Urine TRACE (A) NEG      WBC, UA 20-50 (A) U4 /hpf    RBC, UA 0-5 U5 /hpf    Epithelial Cells UA 0-5 U5 /hpf    BACTERIA, URINE Negative NEG /hpf    Casts 0-2 U2 /lpf   CBC with Auto Differential   Result Value Ref Range    WBC 16.1 (H) 4.3 - 11.1 K/uL    RBC 3.75 (L) 4.23 - 5.6 M/uL    Hemoglobin 10.9 (L) 13.6 - 17.2 g/dL    Hematocrit 33.1 (L) 41.1 - 50.3 %    MCV 88.3 82 - 102 FL    MCH 29.1 26.1 - 32.9 PG    MCHC 32.9 31.4 - 35.0 g/dL    RDW 12.7 11.9 - 14.6 %    Platelets 099 (H) 401 - 450 K/uL    MPV 9.5 9.4 - 12.3 FL    nRBC 0.00 0.0 - 0.2 K/uL

## 2023-04-09 LAB
BACTERIA SPEC CULT: NORMAL
C DIFF GDH STL QL: NORMAL
C DIFF TOX A+B STL QL IA: NORMAL
C DIFF TOXIN INTERPRETATION: NORMAL
CLINICAL CONSIDERATION: NORMAL
REFLEX: NORMAL
SERVICE CMNT-IMP: NORMAL

## 2023-04-09 NOTE — ED NOTES
I have reviewed discharge instructions with the patient. The patient verbalized understanding. Patient left ED via Discharge Method: wheelchair to Home with family. Opportunity for questions and clarification provided. Patient given 2 scripts. To continue your aftercare when you leave the hospital, you may receive an automated call from our care team to check in on how you are doing. This is a free service and part of our promise to provide the best care and service to meet your aftercare needs.  If you have questions, or wish to unsubscribe from this service please call [phone]. Thank you for Choosing our [facility] Emergency Department.         aSnty Rangel RN  04/08/23 4160

## 2023-04-09 NOTE — DISCHARGE INSTRUCTIONS
Be sure to start antibiotic tomorrow. Call surgery office Monday for appointment to recheck and recheck 1. Recheck sooner for vomiting or high fever. It would be worthwhile to carry a stool sample to the primary care doctor to test for infection. Call primary care doctor about these arrangements as well. Recheck for bleeding or high fever.

## 2023-04-18 ENCOUNTER — HOME CARE VISIT (OUTPATIENT)
Dept: SCHEDULING | Facility: HOME HEALTH | Age: 68
End: 2023-04-18
Payer: MEDICARE

## 2023-04-18 VITALS
DIASTOLIC BLOOD PRESSURE: 76 MMHG | OXYGEN SATURATION: 97 % | TEMPERATURE: 98.3 F | SYSTOLIC BLOOD PRESSURE: 128 MMHG | HEART RATE: 80 BPM | RESPIRATION RATE: 16 BRPM

## 2023-04-18 PROCEDURE — G0299 HHS/HOSPICE OF RN EA 15 MIN: HCPCS

## 2023-04-18 ASSESSMENT — ENCOUNTER SYMPTOMS
PAIN LOCATION - PAIN QUALITY: ACHY, SORE
STOOL DESCRIPTION: PARTIALLY FORMED

## 2023-04-21 ENCOUNTER — HOME CARE VISIT (OUTPATIENT)
Dept: SCHEDULING | Facility: HOME HEALTH | Age: 68
End: 2023-04-21
Payer: MEDICARE

## 2023-04-21 VITALS
OXYGEN SATURATION: 96 % | TEMPERATURE: 97 F | RESPIRATION RATE: 18 BRPM | SYSTOLIC BLOOD PRESSURE: 138 MMHG | DIASTOLIC BLOOD PRESSURE: 83 MMHG | HEART RATE: 95 BPM

## 2023-04-21 PROCEDURE — G0299 HHS/HOSPICE OF RN EA 15 MIN: HCPCS

## 2023-04-25 ENCOUNTER — HOME CARE VISIT (OUTPATIENT)
Dept: SCHEDULING | Facility: HOME HEALTH | Age: 68
End: 2023-04-25
Payer: MEDICARE

## 2023-04-25 PROCEDURE — G0299 HHS/HOSPICE OF RN EA 15 MIN: HCPCS

## 2023-04-28 ENCOUNTER — HOME CARE VISIT (OUTPATIENT)
Dept: SCHEDULING | Facility: HOME HEALTH | Age: 68
End: 2023-04-28
Payer: MEDICARE

## 2023-04-28 VITALS
OXYGEN SATURATION: 98 % | DIASTOLIC BLOOD PRESSURE: 80 MMHG | HEART RATE: 79 BPM | RESPIRATION RATE: 18 BRPM | SYSTOLIC BLOOD PRESSURE: 130 MMHG | TEMPERATURE: 97.9 F

## 2023-04-28 PROCEDURE — G0299 HHS/HOSPICE OF RN EA 15 MIN: HCPCS

## 2023-05-02 ENCOUNTER — OFFICE VISIT (OUTPATIENT)
Dept: INTERNAL MEDICINE CLINIC | Facility: CLINIC | Age: 68
End: 2023-05-02
Payer: MEDICARE

## 2023-05-02 VITALS
TEMPERATURE: 98 F | SYSTOLIC BLOOD PRESSURE: 135 MMHG | DIASTOLIC BLOOD PRESSURE: 87 MMHG | HEIGHT: 70 IN | BODY MASS INDEX: 18.47 KG/M2 | OXYGEN SATURATION: 97 % | HEART RATE: 96 BPM | WEIGHT: 129 LBS

## 2023-05-02 VITALS
RESPIRATION RATE: 18 BRPM | OXYGEN SATURATION: 98 % | SYSTOLIC BLOOD PRESSURE: 138 MMHG | DIASTOLIC BLOOD PRESSURE: 80 MMHG | HEART RATE: 88 BPM | TEMPERATURE: 98.5 F

## 2023-05-02 DIAGNOSIS — R04.0 EPISTAXIS: ICD-10-CM

## 2023-05-02 DIAGNOSIS — Z12.11 COLON CANCER SCREENING: ICD-10-CM

## 2023-05-02 DIAGNOSIS — E78.2 MIXED HYPERLIPIDEMIA: ICD-10-CM

## 2023-05-02 DIAGNOSIS — K92.9 FUNCTIONAL GASTROINTESTINAL DISTURBANCE: ICD-10-CM

## 2023-05-02 DIAGNOSIS — M79.622 LEFT UPPER ARM PAIN: Primary | ICD-10-CM

## 2023-05-02 PROCEDURE — 1123F ACP DISCUSS/DSCN MKR DOCD: CPT | Performed by: INTERNAL MEDICINE

## 2023-05-02 PROCEDURE — G8420 CALC BMI NORM PARAMETERS: HCPCS | Performed by: INTERNAL MEDICINE

## 2023-05-02 PROCEDURE — G8427 DOCREV CUR MEDS BY ELIG CLIN: HCPCS | Performed by: INTERNAL MEDICINE

## 2023-05-02 PROCEDURE — 99214 OFFICE O/P EST MOD 30 MIN: CPT | Performed by: INTERNAL MEDICINE

## 2023-05-02 PROCEDURE — 3017F COLORECTAL CA SCREEN DOC REV: CPT | Performed by: INTERNAL MEDICINE

## 2023-05-02 PROCEDURE — 4004F PT TOBACCO SCREEN RCVD TLK: CPT | Performed by: INTERNAL MEDICINE

## 2023-05-02 RX ORDER — ATORVASTATIN CALCIUM 40 MG/1
40 TABLET, FILM COATED ORAL DAILY
Qty: 90 TABLET | Refills: 1 | Status: SHIPPED | OUTPATIENT
Start: 2023-05-02 | End: 2023-07-31

## 2023-05-02 ASSESSMENT — PATIENT HEALTH QUESTIONNAIRE - PHQ9
SUM OF ALL RESPONSES TO PHQ9 QUESTIONS 1 & 2: 0
2. FEELING DOWN, DEPRESSED OR HOPELESS: 0
SUM OF ALL RESPONSES TO PHQ QUESTIONS 1-9: 0
1. LITTLE INTEREST OR PLEASURE IN DOING THINGS: 0
SUM OF ALL RESPONSES TO PHQ QUESTIONS 1-9: 0

## 2023-05-02 NOTE — ASSESSMENT & PLAN NOTE
Patient with continued left upper arm and shoulder pain. Shoulder joint is compromised with recurrent subluxations occurring. Offering referral to Orthopedics at this time.

## 2023-05-02 NOTE — ACP (ADVANCE CARE PLANNING)
Advance Care Planning   The patient has the following advanced directives on file:  Advance Directives       Power of  Living Will ACP-Advance Directive ACP-Power of Tierra Chauhan on 03/16/23 Not on File Not on File Filed            The patient has appointed the following active healthcare agents:    Primary Decision Maker: Marylou Dodd - Brother/Sister - [phone]    Secondary Decision Maker: ChristiejaySheridan waggonerElysia - Kyle - [phone]    The Patient has the following current code status:    Code Status: Prior    Visit Documentation:  I discussed [address] with Chioma Seen today which included the importance of making their choices for care and treatment in the case of a health event that adversely affects their decision-making abilities. He has not completed the Advance Care Directives. He does not have an active health care agent at this time. Jeanthienalan Seen was encouraged to complete the declaration forms and provide a signed copy of his medical records.          Nicholas Olivares  5/2/2023

## 2023-05-02 NOTE — PROGRESS NOTES
Krystina Cardenas (: 1955) is a 78 y.o. male, here for evaluation of the following chief complaint(s):  Arm Pain (Pt states he has pain in L arm that from incident that occurred 2 yrs ago. Pt states that his arm pops out of socket.), Epistaxis (Pt states sx's started today. Pt states this has happened before), and Shoulder Pain (L Shoulder)       ASSESSMENT/PLAN:  1. Left upper arm pain  Assessment & Plan:   Patient with continued left upper arm and shoulder pain. Shoulder joint is compromised with recurrent subluxations occurring. Offering referral to Orthopedics at this time. 2. Epistaxis  Assessment & Plan:   Occurred on way to office today. Currently plugged with gauze and is controlled. 3. Colon cancer screening  4. Mixed hyperlipidemia  The following orders have not been finalized:  -     atorvastatin (LIPITOR) 40 MG tablet             SUBJECTIVE/OBJECTIVE:  HPI: Patient is a very pleasant 71-year-old man with a very complex medical history that presents for routine follow-up visit with problem specific complaint of left upper arm pain. Patient's left upper arm pain has been present for many years. Patient with unstable left shoulder joint. Subluxations occur intermittently and self resolve. Patient will be referred at this time to orthopedic surgery for further consideration of surgical correction of unstable shoulder joint. Patient with current epistaxis packed with gauze well controlled. This patient's first episode of bleeding in quite some time. Patient recently underwent significant abdominal surgeries related to umbilical hernia repair. Patient is doing well and healing without issue currently.     Social History     Tobacco Use    Smoking status: Every Day     Packs/day: 0.25     Years: 30.00     Pack years: 7.50     Types: Cigarettes    Smokeless tobacco: Never    Tobacco comments:     Began smoking age 16   Vaping Use    Vaping Use: Never used   Substance Use Topics

## 2023-05-05 PROBLEM — E78.2 MIXED HYPERLIPIDEMIA: Status: ACTIVE | Noted: 2023-05-05

## 2023-05-05 NOTE — ASSESSMENT & PLAN NOTE
Patient with significant Umbilical hernia requiring surgical correction with subsequent complications requiring nutritional assistance. Following with General Surgery. Will fill out paperwork to obtain additional nutritional assistance at this time.

## 2023-05-10 ENCOUNTER — TELEPHONE (OUTPATIENT)
Dept: SURGERY | Age: 68
End: 2023-05-10

## 2023-05-10 NOTE — TELEPHONE ENCOUNTER
He called to let us know that when he does different activities from time to time he still has pain in the area where his hernia was repaired.

## (undated) DEVICE — [HIGH FLOW INSUFFLATOR,  DO NOT USE IF PACKAGE IS DAMAGED,  KEEP DRY,  KEEP AWAY FROM SUNLIGHT,  PROTECT FROM HEAT AND RADIOACTIVE SOURCES.]: Brand: PNEUMOSURE

## (undated) DEVICE — ARM DRAPE

## (undated) DEVICE — INTENDED FOR TISSUE SEPARATION, AND OTHER PROCEDURES THAT REQUIRE A SHARP SURGICAL BLADE TO PUNCTURE OR CUT.: Brand: BARD-PARKER ® STAINLESS STEEL BLADES

## (undated) DEVICE — GENERAL LAPAROSCOPY: Brand: MEDLINE INDUSTRIES, INC.

## (undated) DEVICE — SUTURE VCRL SZ 0 L18IN ABSRB UD L36MM CT-1 1/2 CIR J840D

## (undated) DEVICE — ELECTRO LUBE IS A SINGLE PATIENT USE DEVICE THAT IS INTENDED TO BE USED ON ELECTROSURGICAL ELECTRODES TO REDUCE STICKING.: Brand: KEY SURGICAL ELECTRO LUBE

## (undated) DEVICE — SYSTEM SKIN CLSR 22CM DERMBND PRINEO

## (undated) DEVICE — STERILE SLEEVE: Brand: CONVERTORS

## (undated) DEVICE — SUTURE DEV SZ 2-0 WND CLSR ABSRB GS-22 VLOC COVIDIEN VLOCM2145

## (undated) DEVICE — MASTISOL ADHESIVE LIQ 2/3ML

## (undated) DEVICE — 3M™ IOBAN™ 2 ANTIMICROBIAL INCISE DRAPE 6650EZ: Brand: IOBAN™ 2

## (undated) DEVICE — TROCAR: Brand: KII FIOS FIRST ENTRY

## (undated) DEVICE — SUTURE MCRYL SZ 3-0 L18IN ABSRB UD L19MM PS-2 3/8 CIR PRIM Y497G

## (undated) DEVICE — TUBING INSUFFLATION SMK EVAC HI FLO SET PNEUMOCLEAR

## (undated) DEVICE — RELOAD STPL L55MM OPN H3.8MM CLS H1.5MM WIRE DIA0.2MM REG

## (undated) DEVICE — STRIP,CLOSURE,WOUND,MEDI-STRIP,1/2X4: Brand: MEDLINE

## (undated) DEVICE — SUTURE VCRL SZ 3-0 L18IN ABSRB UD L26MM SH 1/2 CIR J864D

## (undated) DEVICE — BLADELESS OBTURATOR: Brand: WECK VISTA

## (undated) DEVICE — SUTURE ABSORBABLE ANTIBACT 2-0 CT-2 9 IN STRATAFIX PDS + SXPP1A422

## (undated) DEVICE — SOLUTION IRRIG 1000ML 0.9% SOD CHL USP POUR PLAS BTL

## (undated) DEVICE — AIRSEAL BIFURCATED FILTERED TUBESET WITH ACTIVATED CHARCOAL FILTER: Brand: AIRSEAL

## (undated) DEVICE — GLOVE SURG SZ 7 L12IN FNGR THK79MIL GRN LTX FREE

## (undated) DEVICE — SUTURE ABSRB L12IN L12MM SZ 2-0 GS-22 VLT GLYCOLIDE VLOCM2115

## (undated) DEVICE — SUTURE PERMAHAND SZ 2-0 L18IN NONABSORBABLE BLK L26MM SH C012D

## (undated) DEVICE — STAPLER SKIN SQ 30 ABSRB STPL DISP INSORB ORDER VIA PHONE OR EMAIL

## (undated) DEVICE — SUTURE ABSORBABLE L9IN 0 VIOLET GS21 VLOC 90 VLOCM0346

## (undated) DEVICE — SUTURE MCRYL SZ 3-0 L27IN ABSRB UD L19MM PS-2 3/8 CIR PRIM Y427H

## (undated) DEVICE — PAD PT POS 36 IN SURGYPAD DISP

## (undated) DEVICE — SEALER LAP L37CM MARYLAND JAW OPN NANO COAT MULTIFUNCTIONAL

## (undated) DEVICE — STAPLER INT L55MM CUT LN L53MM STPL LN L57MM BLU B FRM 8

## (undated) DEVICE — SUTURE VCRL SZ 0 L18IN ABSRB VLT L26MM CT-2 1/2 CIR J727D

## (undated) DEVICE — GLOVE ORANGE PI 7   MSG9070

## (undated) DEVICE — TROCAR: Brand: KII® SLEEVE

## (undated) DEVICE — COLUMN DRAPE

## (undated) DEVICE — SOLUTION IRRIG 3000ML 0.9% SOD CHL USP UROMATIC PLAS CONT

## (undated) DEVICE — PUMP SUC IRR TBNG L10FT W/ HNDPC ASSEMB STRYKEFLOW 2

## (undated) DEVICE — BLADE ES ELASTOMERIC COAT INSUL DURABLE BEND UPTO 90DEG

## (undated) DEVICE — CANNULA SEAL

## (undated) DEVICE — LOGICUT SCISSOR LENGTH 320MM: Brand: LOGI - LAPAROSCOPIC INSTRUMENT SYSTEM

## (undated) DEVICE — DRAPE,TOP,102X53,STERILE: Brand: MEDLINE

## (undated) DEVICE — SPONGE LAPAROTOMY W18XL18IN WHITE STRUNG RADIOPAQUE STERILE

## (undated) DEVICE — DEVICE FIX OPN ABSRB STRP SECURESTRP

## (undated) DEVICE — SHEARS ENDOSCP L36CM DIA5MM ULTRASONIC CRV TIP W/ ADV

## (undated) DEVICE — SUTURE PERMAHAND SZ 3-0 L30IN NONABSORBABLE BLK L26MM SH C017D

## (undated) DEVICE — DISPOSABLE GRASPER CARTRIDGE: Brand: DIRECT DRIVE REPOSABLE GRASPERS

## (undated) DEVICE — AIRSEAL 8 MM CANNULA CAP AND OBTURATOR WITH BLADELESS OPTICAL TIP COMPATIBLE WITH INTUITIVE DA VINCI XI AND DA VINCI X 8 MM INSTRUMENT CANNULA, STANDARD LENGTH: Brand: AIRSEAL